# Patient Record
Sex: FEMALE
[De-identification: names, ages, dates, MRNs, and addresses within clinical notes are randomized per-mention and may not be internally consistent; named-entity substitution may affect disease eponyms.]

---

## 2018-11-30 ENCOUNTER — HOSPITAL ENCOUNTER (EMERGENCY)
Dept: HOSPITAL 90 - EDH | Age: 63
Discharge: HOME | End: 2018-11-30
Payer: MEDICAID

## 2018-11-30 DIAGNOSIS — E11.22: ICD-10-CM
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
## 2018-11-30 DIAGNOSIS — F32.9: ICD-10-CM

## 2018-11-30 DIAGNOSIS — Z98.890: ICD-10-CM

## 2018-11-30 DIAGNOSIS — S00.93XA: ICD-10-CM

## 2018-11-30 DIAGNOSIS — S02.5XXA: Primary | ICD-10-CM
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
## 2018-11-30 DIAGNOSIS — N18.9: ICD-10-CM

## 2018-11-30 DIAGNOSIS — I12.9: ICD-10-CM

## 2018-11-30 DIAGNOSIS — S13.4XXA: ICD-10-CM

## 2018-11-30 DIAGNOSIS — Z91.041: ICD-10-CM
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
## 2018-11-30 PROCEDURE — 70486 CT MAXILLOFACIAL W/O DYE: CPT

## 2018-11-30 PROCEDURE — 72125 CT NECK SPINE W/O DYE: CPT

## 2024-12-12 ENCOUNTER — HOSPITAL ENCOUNTER (EMERGENCY)
Dept: HOSPITAL 90 - EDH | Age: 69
Discharge: HOME | End: 2024-12-12
Payer: MEDICARE

## 2024-12-12 VITALS
OXYGEN SATURATION: 97 % | RESPIRATION RATE: 18 BRPM | SYSTOLIC BLOOD PRESSURE: 163 MMHG | DIASTOLIC BLOOD PRESSURE: 62 MMHG | HEART RATE: 84 BPM | TEMPERATURE: 98.7 F

## 2024-12-12 VITALS — BODY MASS INDEX: 27.48 KG/M2 | WEIGHT: 139.99 LBS | HEIGHT: 60 IN

## 2024-12-12 DIAGNOSIS — R22.0: Primary | ICD-10-CM

## 2024-12-12 DIAGNOSIS — E11.9: ICD-10-CM

## 2024-12-12 DIAGNOSIS — Z79.899: ICD-10-CM

## 2024-12-12 DIAGNOSIS — E66.9: ICD-10-CM

## 2024-12-12 DIAGNOSIS — I10: ICD-10-CM

## 2024-12-12 DIAGNOSIS — Z88.8: ICD-10-CM

## 2024-12-12 DIAGNOSIS — Z79.82: ICD-10-CM

## 2024-12-12 DIAGNOSIS — Z79.02: ICD-10-CM

## 2024-12-12 DIAGNOSIS — J45.909: ICD-10-CM

## 2024-12-12 DIAGNOSIS — Z90.49: ICD-10-CM

## 2024-12-12 DIAGNOSIS — Z79.84: ICD-10-CM

## 2024-12-12 DIAGNOSIS — Z91.041: ICD-10-CM

## 2024-12-12 DIAGNOSIS — E78.00: ICD-10-CM

## 2024-12-12 LAB
BASOPHILS # BLD AUTO: 0.08 K/UL (ref 0–0.2)
BASOPHILS NFR BLD AUTO: 0.7 % (ref 0–5)
BNP SERPL-MCNC: 538 PG/ML (ref 0–100)
BUN SERPL-MCNC: 48 MG/DL (ref 7–18)
CHLORIDE SERPL-SCNC: 113 MMOL/L (ref 101–111)
CO2 SERPL-SCNC: 24 MMOL/L (ref 21–32)
CREAT SERPL-MCNC: 3 MG/DL (ref 0.5–1)
EOSINOPHIL # BLD AUTO: 0.41 K/UL (ref 0–0.7)
EOSINOPHIL NFR BLD AUTO: 3.7 % (ref 0–8)
ERYTHROCYTE [DISTWIDTH] IN BLOOD BY AUTOMATED COUNT: 14.5 % (ref 11–15.5)
GFR SERPL CREATININE-BSD FRML MDRD: 17 ML/MIN (ref 90–?)
GLUCOSE SERPL-MCNC: 129 MG/DL (ref 70–105)
HCT VFR BLD AUTO: 33.4 % (ref 36–48)
IMM GRANULOCYTES # BLD: 0.05 K/UL (ref 0–1)
LYMPHOCYTES # SPEC AUTO: 1.6 K/UL (ref 1–4.8)
LYMPHOCYTES NFR SPEC AUTO: 14.4 % (ref 21–51)
MCH RBC QN AUTO: 31 PG (ref 27–33)
MCHC RBC AUTO-ENTMCNC: 31.7 G/DL (ref 32–36)
MCV RBC AUTO: 97.7 FL (ref 79–99)
MONOCYTES # BLD AUTO: 0.8 K/UL (ref 0.1–1)
MONOCYTES NFR BLD AUTO: 6.9 % (ref 3–13)
NEUTROPHILS # BLD AUTO: 8.1 K/UL (ref 1.8–7.7)
NEUTROPHILS NFR BLD AUTO: 73.8 % (ref 40–77)
NRBC BLD MANUAL-RTO: 0 % (ref 0–0.19)
PLATELET # BLD AUTO: 290 K/UL (ref 130–400)
POTASSIUM SERPL-SCNC: 3.6 MMOL/L (ref 3.5–5.1)
RBC # BLD AUTO: 3.42 MIL/UL (ref 4–5.5)
SODIUM SERPL-SCNC: 146 MMOL/L (ref 136–145)
WBC # BLD AUTO: 11 K/UL (ref 4.8–10.8)

## 2024-12-12 PROCEDURE — 83880 ASSAY OF NATRIURETIC PEPTIDE: CPT

## 2024-12-12 PROCEDURE — 36415 COLL VENOUS BLD VENIPUNCTURE: CPT

## 2024-12-12 PROCEDURE — 85025 COMPLETE CBC W/AUTO DIFF WBC: CPT

## 2024-12-12 PROCEDURE — 71045 X-RAY EXAM CHEST 1 VIEW: CPT

## 2024-12-12 PROCEDURE — 96372 THER/PROPH/DIAG INJ SC/IM: CPT

## 2024-12-12 PROCEDURE — 80048 BASIC METABOLIC PNL TOTAL CA: CPT

## 2024-12-12 PROCEDURE — 99284 EMERGENCY DEPT VISIT MOD MDM: CPT

## 2024-12-12 NOTE — HMCIMG
CHEST 1VW



REASON: swelling



COMPARISON: 12/3/2024



FINDINGS:  

Single view of the chest was obtained. Lungs are clear. Heart size is

normal. There is no pulmonary vascular congestion. Mediastinum and bony

thorax appear unremarkable. Posterior column stimulator leads appear

unchanged.



IMPRESSION:



1. No acute finding.

## 2024-12-12 NOTE — ERN
General


Chief Complaint:  Face Pain/Problem


Stated Complaint:  FACIAL SWELLING ONSET 0800


Time Seen by MD:  11:52





History of Present Illness


Initial Comments


69-year-old female who presents for facial swelling.  Patient was just admitted 

to this hospital for about a week for fluid overload.  She was discharged with a

diagnosis of cellulitis and fluid overload pulmonary edema.  She was switched 

from IV diuretics to torsemide, her 1st dose was last night.  She also started 

taking oral doxycycline last night.  She woke up this morning with bilateral eye

puffiness and itching.  She denies any dyspnea cough congestion sore throat 

swelling or rash.  Is unclear if it was this is a reaction to the torsemide or 

to the doxycycline.


Allergies:  


Coded Allergies:  


     iodine (Unverified  Allergy, Hives, itching, 7/22/13)


Home Meds


Active Scripts


Doxycycline Hyclate (Doxycycline Hyclate) 100 Mg Capsule, 100 MG PO BID, #8 CAP


   Prov:ORLANDO WOOD FNP         12/11/24


Nifedipine (Nifedipine ER) 30 Mg Tablet.er, 30 MG PO TIDMEALS, #90 TAB 0 Refills


   Prov:KATI YUAN AGPCNP         12/7/24


Atorvastatin Calcium (LIPITOR) 40 Mg Tablet, 40 MG PO HS, #30 TAB 0 Refills


   [facility]         12/7/24


Reported Medications


Budesonide/Formoterol Fumarate (Budesonide-Formoterol 160-4.5) 160 Mcg-4.5 

Mcg/Actuation Hfa.aer.ad, 1 PUFF IH DAILY for 30 Days, #10.2 GM 0 Refills


   12/5/24


Carvedilol (Carvedilol) 6.25 Mg Tablet, 1 TAB PO BID


   12/2/24


Fluticasone Propionate (Fluticasone Propionate) 50 Mcg/Actuation [place].susp, 2 

SPRAY NS DAILY for 30 Days, #16 GM 0 Refills


   12/1/24


Azelastine HCl (Azelastine HCl) 137 Mcg (0.1 %) [place].pump, 2 SPRAY NS BID for 

30 Days, #30 ML 0 Refills


   12/1/24


Cholecalciferol (Vitamin D3) (Vitamin D3) 25 Mcg (1000 Unit) Capsule, 1 CAP PO 

DAILY for 30 Days, #30 CAP 0 Refills


   12/1/24


Ondansetron HCl (Ondansetron HCl) 4 Mg Tablet, 1 TAB PO Q4HPRN PRN for 

nausea/vomiting for 3 Days, #18 TAB 0 Refills


   12/1/24


Aspirin (Aspirin) 81 Mg Tab.chew, 1 TAB PO DAILY for 30 Days, #30 TAB 0 Refills


   12/1/24


Montelukast Sodium (Singulair) 10 Mg Tablet, 10 MG PO HS, TAB


   12/1/24


Dapagliflozin Propanediol (Farxiga) 5 Mg Tablet, 5 MG PO DAILY, TAB


   12/1/24


Hydralazine HCl (Hydralazine HCl) 50 Mg Tablet, 1 TAB PO BID for 30 Days, #60 

TAB 0 Refills


   12/1/24


Torsemide (Torsemide) 20 Mg Tablet, 1 TAB PO DAILY for 30 Days, #30 TAB 0 

Refills


   12/1/24


Losartan Potassium (Losartan Potassium) 100 Mg Tablet, 1 TAB PO DAILY for 30 

Days, #30 TAB 0 Refills


   12/1/24


Omeprazole (Omeprazole) 40 Mg Capsule.dr, 1 CAP PO DAILY for 30 Days, #30 CAP 0 

Refills


   12/1/24


Clopidogrel Bisulfate (Plavix) 75 Mg Tablet, 1 TAB PO DAILY for 30 Days, #30 TAB

0 Refills


   12/1/24


Vit B Cmplx 3/FA/Vit C/Biotin (Ruby-John Rx Tablet) 1 Mg-60 Mg-300 Mcg Tablet, 1

TAB PO DAILY for 30 Days, #30 TAB 0 Refills


   12/1/24


Gabapentin (Gabapentin) 100 Mg Capsule, 3 CAP PO BID for 30 Days, #90 CAP 0 

Refills


   12/1/24


Discontinued Reported Medications


Rosuvastatin Calcium (Rosuvastatin Calcium) 20 Mg Tablet, 20 MG PO DAILY, TAB


   12/1/24





Past Medical History


Past Medical History:  Asthma, Diabetes-Type II, High Cholesterol, Hypertension,

Kidney Infection, Sinusitis, Vascular Disease


Medical History Other:  CHRONIC BACK PAIN


Past Surgical History:  Appendectomy


Surgical History Other:  BACK SX





ROS Dictation


CONSTITUTIONAL:  No chills, no fever, no weakness, no diaphoresis, no malaise.


HEAD/FACE:  No signs of trauma. 


EENT:  No eye pain, no blurred vision, no tearing, no double vision, no ear 

pain, no ear discharge, no nose pain, no nasal congestion, no throat pain, no 

throat swelling, no mouth pain. 


RESPIRATORY:  No cough, no orthopnea, no SOB, no stridor, no wheezing. 


CARDIOVASCULAR:  No chest pain, no edema, no palpitations, no syncope. 


GASTROINTESTINAL/ABDOMINAL:   No abdominal pain, no constipation, no diarrhea, 

no nausea, no vomiting. 


GENITOURINARY:  No abnormal discharge, no dysuria, no frequent urination, no 

hematuria. No complaints of pain in the genitals. 


MUSCULOSKELETAL:  No back pain, no gout, no joint pain, no joint swelling, no 

muscle pain, no muscle stiffness, no neck pain. 


INTEGUMENTARY:  Face swelling


NEUROLOGICAL/PSYCH:  No anxiety, not depressed, no emotional problem, no 

headache, no numbness, no pre-existing deficit, no history of seizures,  no 

tremors, no weakness. 


HEMATOLOGIC/LYMPHATIC:  Not anemic, no history of blood clots, no apparent 

bleeding, no bruising, glands not swollen.


All Systems Negative, Except as Noted.





Physical Exam


Physical Exam Dictation


VITAL SIGNS:  Reviewed. 


GENERAL APPEARANCE:  Alert, oriented x3, no acute distress, obese.


HEAD AND FACE: Non-traumatic. 


EYES:   PERRL, pink conjunctivas, eyelid no trauma, anterior chamber clear. 


EARS:  Pinnas intact and no signs of trauma or erythema.  Ear canals clear and 

no discharge. TMs no erythema. 


NOSE:  No discharge, no bleeding. 


OROPHARYNX:  Mouth normal, teeth no caries, tongue pink.  Pharynx clear, no 

erythema.  Tonsils no exudates, no abscesses noted. Mucous membrane moist.


NECK:  Supple, non-tender, no thyromegaly, no masses, no JVD, no bruits. 


BREAST:  Deferred.


CHEST:   No tenderness, no crepitus, no paradoxical movement, no retractions.


LUNGS:  Clear, well-ventilated, symmetric, no rales, no wheezing, no rhonchi, no

stridor, good breath sounds bilaterally. 


HEART:  Regular rate, regular rhythm, no murmur, no gallops. 


VASCULAR: No peripheral edema.


ABDOMEN: Soft, positive bowel sounds, nondistended, no guarding, nontender, no 

rebound, no masses no hepatomegaly, no splenomegaly, no Carmona's sign, no 

hernias. 


RECTAL: Deferred. 


GENITAL:  Deferred. 


NEUROLOGICAL:  Normal speech, gross motor function intact, gross sensory 

function intact.


MUSCULOSKELETAL:  Neck nontender, full range of motion, back nontender, full 

range of motion.


EXTREMITIES: Nontender, full range of motion. 


SKIN:  Color pink, dry, no turgor, no rash, no lacerations, no abrasions, no 

contusions.


LYMPHATICS:  Deferred.





Results


Laboratory and Microbiology


Lab and Micro Result





Laboratory Tests








Test


 12/12/24


12:44


 


White Blood Count


 11.0 K/uL


(4.8-10.8)  H


 


Red Blood Count


 3.42 MIL/uL


(4.00-5.50)  L


 


Hemoglobin


 10.6 g/dL


(12.0-16.0)  L


 


Hematocrit


 33.4 % (36-48)


L


 


Mean Corpuscular Volume


 97.7 fL


(79-99)


 


Mean Corpuscular Hemoglobin


 31.0 pg


(27.0-33.0)


 


Mean Corpuscular Hemoglobin


Concent 31.7 g/dL


(32.0-36.0)  L


 


Red Cell Distribution Width


 14.5 %


(11.0-15.5)


 


Platelet Count


 290 K/uL


(130-400)


 


Mean Platelet Volume


 11.2 fL


(7.5-10.5)  H


 


Immature Granulocyte % (Auto) 0.5 % (0-1)  


 


Neutrophils (%) (Auto)


 73.8 %


(40.0-77.0)


 


Lymphocytes (%) (Auto)


 14.4 %


(21.0-51.0)  L


 


Monocytes (%) (Auto)


 6.9 %


(3.0-13.0)


 


Eosinophils (%) (Auto)


 3.7 %


(0.0-8.0)


 


Basophils (%) (Auto)


 0.7 %


(0.0-5.0)


 


Neutrophils # (Auto)


 8.1 K/uL


(1.8-7.7)  H


 


Lymphocytes # (Auto)


 1.6 K/uL


(1.0-4.8)


 


Monocytes # (Auto)


 0.8 K/uL


(0.1-1.0)


 


Eosinophils # (Auto)


 0.41 K/uL


(0.00-0.70)


 


Basophils # (Auto)


 0.08 K/uL


(0.00-0.20)


 


Absolute Immature Granulocyte


(auto 0.05 K/uL


(0-1)


 


Nucleated Red Blood Cells


 0.0 %


(0.0-0.19)


 


Sodium Level


 146 mmol/L


(136-145)  H


 


Potassium Level


 3.6 mmol/L


(3.5-5.1)


 


Chloride Level


 113 mmol/L


(101-111)  H


 


Carbon Dioxide Level


 24 mmol/L


(21-32)


 


Blood Urea Nitrogen


 48 mg/dL


(7-18)  H


 


Creatinine


 3.0 mg/dL


(0.5-1.0)  H


 


Glomerular Filtration Rate


Calc 17 mL/min


(>90)


 


Random Glucose


 129 mg/dL


()  H


 


Total Calcium


 8.2 mg/dL


(8.5-10.1)  L


 


B-Type Natriuretic Peptide


 538 pg/mL


(0-100)  H











MDM


CC:  Face swelling


Historian:  Patient 


Comorbidities: Advanced age, diabetes, hypertension, CHF, CKD, obesity


Limitations by social determinants of health:  None


Differential diagnosis: Medication reaction, allergic reaction.  


Vital signs are stable, remained stable in the ER 


The lab work is at baseline.  I reviewed the patient's chart including previous 

labs.  Everything appears to be at baseline.  


There is no imaging indicated 


Difficult to tell if this is a reaction to the torsemide of the doxycycline.  It

is consistent with an allergic reaction.  She did recently start doxycycline.  

We will recommend that she stops taking the doxycycline and switch to Keflex.  

We will recommend that she continues with the torsemide.  She will follow up 

with her symptoms return to the emergency department 48 hours if she has a 

concerns.  I have very low suspicion for anaphylaxis, flank fluid overload, or 

other life-threatening pathology at this time.


ED Course





Orders








Procedure Category Date Status





  Time 


 


Cbc With Differential LAB 12/12/24 Complete





  12:29 


 


B-Type Natriuretic LAB 12/12/24 Complete





Peptide  12:29 


 


Chest 1vw RAD 12/12/24 Logged





  12:29 


 


Basic Metabolic Panel LAB 12/12/24 Complete





  12:29 


 


12 Lead Ekg Tracing- EKG 12/12/24 Logged





Technical  12:29 








Vital Signs








  Date Time  Temp Pulse Resp B/P (MAP) Pulse Ox O2 Delivery O2 Flow Rate FiO2


 


12/12/24 11:48 97.9 77 16 135/70 97 Room Air 0 











DX & DISP


Disposition:  Discharge


Departure


Impression:  


   Primary Impression:  Facial swelling


Condition:  Stable


Assign Patient to:


As we discussed, it was difficult to tell if you are having a reaction to the 

torsemide or the doxycycline.  





As we discussed, stop taking the doxycycline.  I have prescribed cephalexin to 

take instead.  Take this 3 times per day as prescribed.  





Continue with the torsemide.  





Your lab work is unremarkable.  Your at your baseline.  





If you continue with swelling or any other concerning symptoms in 48 hours, I 

recommend that you return to the emergency department for re-evaluation or 

follow up with the primary doctor.


Scripts


Cephalexin (Cephalexin) 500 Mg Capsule


1 CAP PO TID for 7 Days, #21 CAP 0 Refills


   Prov: KAELA SANDS DO         12/12/24


Referrals:  


RAVINDRA ARTHUR (PCP)











KAELA SANDS DO                Dec 12, 2024 14:02

## 2024-12-13 ENCOUNTER — HOSPITAL ENCOUNTER (EMERGENCY)
Dept: HOSPITAL 90 - EDH | Age: 69
Discharge: HOME | End: 2024-12-13
Payer: MEDICARE

## 2024-12-13 VITALS
SYSTOLIC BLOOD PRESSURE: 103 MMHG | OXYGEN SATURATION: 98 % | DIASTOLIC BLOOD PRESSURE: 55 MMHG | TEMPERATURE: 97.1 F | RESPIRATION RATE: 16 BRPM | HEART RATE: 53 BPM

## 2024-12-13 VITALS — BODY MASS INDEX: 33.6 KG/M2 | WEIGHT: 160.06 LBS | HEIGHT: 58 IN

## 2024-12-13 DIAGNOSIS — J45.909: ICD-10-CM

## 2024-12-13 DIAGNOSIS — I10: ICD-10-CM

## 2024-12-13 DIAGNOSIS — E78.00: ICD-10-CM

## 2024-12-13 DIAGNOSIS — Z79.84: ICD-10-CM

## 2024-12-13 DIAGNOSIS — Z79.82: ICD-10-CM

## 2024-12-13 DIAGNOSIS — M79.605: ICD-10-CM

## 2024-12-13 DIAGNOSIS — Z90.49: ICD-10-CM

## 2024-12-13 DIAGNOSIS — E11.59: ICD-10-CM

## 2024-12-13 DIAGNOSIS — Z79.02: ICD-10-CM

## 2024-12-13 DIAGNOSIS — Z79.899: ICD-10-CM

## 2024-12-13 DIAGNOSIS — Z91.041: ICD-10-CM

## 2024-12-13 DIAGNOSIS — G89.29: Primary | ICD-10-CM

## 2024-12-13 DIAGNOSIS — Z88.8: ICD-10-CM

## 2024-12-13 PROCEDURE — 99284 EMERGENCY DEPT VISIT MOD MDM: CPT

## 2024-12-13 PROCEDURE — 96372 THER/PROPH/DIAG INJ SC/IM: CPT

## 2024-12-13 NOTE — ERN
General


Chief Complaint:  Hip Pain/Injury


Stated Complaint:  LEFT HIP TO LEG PAIN


Time Seen by MD:  17:53


Time Seen by Midlevel:  17:53


Source:  patient





History of Present Illness


Initial Comments


Patient is a 69-year-old female with a past medical history of chronic back pain

and chronic pain presenting to the emergency department for evaluation of 

atraumatic left leg pain.  Patients specifically denies any injury.  Denies any 

fall.  Patient was just seeking pain control.  Denies any other symptoms at this

time.


Allergies:  


Coded Allergies:  


     iodine (Unverified  Allergy, Hives, itching, 7/22/13)


Home Meds


Active Scripts


Cephalexin (Cephalexin) 500 Mg Capsule, 1 CAP PO TID for 7 Days, #21 CAP 0 

Refills


   Prov:KAELA SANDS DO         12/12/24


Doxycycline Hyclate (Doxycycline Hyclate) 100 Mg Capsule, 100 MG PO BID, #8 CAP


   Prov:ORLANDO WOOD FNP         12/11/24


Nifedipine (Nifedipine ER) 30 Mg Tablet.er, 30 MG PO TIDMEALS, #90 TAB 0 Refills


   Prov:KAIT YUAN AGPCNP         12/7/24


Atorvastatin Calcium (LIPITOR) 40 Mg Tablet, 40 MG PO HS, #30 TAB 0 Refills


   [facility]         12/7/24


Reported Medications


Budesonide/Formoterol Fumarate (Budesonide-Formoterol 160-4.5) 160 Mcg-4.5 

Mcg/Actuation Hfa.aer.ad, 1 PUFF IH DAILY for 30 Days, #10.2 GM 0 Refills


   12/5/24


Carvedilol (Carvedilol) 6.25 Mg Tablet, 1 TAB PO BID


   12/2/24


Fluticasone Propionate (Fluticasone Propionate) 50 Mcg/Actuation [place].susp, 2 

SPRAY NS DAILY for 30 Days, #16 GM 0 Refills


   12/1/24


Azelastine HCl (Azelastine HCl) 137 Mcg (0.1 %) [place].pump, 2 SPRAY NS BID for 

30 Days, #30 ML 0 Refills


   12/1/24


Cholecalciferol (Vitamin D3) (Vitamin D3) 25 Mcg (1000 Unit) Capsule, 1 CAP PO 

DAILY for 30 Days, #30 CAP 0 Refills


   12/1/24


Ondansetron HCl (Ondansetron HCl) 4 Mg Tablet, 1 TAB PO Q4HPRN PRN for nausea/v

omiting for 3 Days, #18 TAB 0 Refills


   12/1/24


Aspirin (Aspirin) 81 Mg Tab.chew, 1 TAB PO DAILY for 30 Days, #30 TAB 0 Refills


   12/1/24


Montelukast Sodium (Singulair) 10 Mg Tablet, 10 MG PO HS, TAB


   12/1/24


Dapagliflozin Propanediol (Farxiga) 5 Mg Tablet, 5 MG PO DAILY, TAB


   12/1/24


Hydralazine HCl (Hydralazine HCl) 50 Mg Tablet, 1 TAB PO BID for 30 Days, #60 

TAB 0 Refills


   12/1/24


Torsemide (Torsemide) 20 Mg Tablet, 1 TAB PO DAILY for 30 Days, #30 TAB 0 

Refills


   12/1/24


Losartan Potassium (Losartan Potassium) 100 Mg Tablet, 1 TAB PO DAILY for 30 

Days, #30 TAB 0 Refills


   12/1/24


Omeprazole (Omeprazole) 40 Mg Capsule.dr, 1 CAP PO DAILY for 30 Days, #30 CAP 0 

Refills


   12/1/24


Clopidogrel Bisulfate (Plavix) 75 Mg Tablet, 1 TAB PO DAILY for 30 Days, #30 TAB

0 Refills


   12/1/24


Vit B Cmplx 3/FA/Vit C/Biotin (Ruby-John Rx Tablet) 1 Mg-60 Mg-300 Mcg Tablet, 1

TAB PO DAILY for 30 Days, #30 TAB 0 Refills


   12/1/24


Gabapentin (Gabapentin) 100 Mg Capsule, 3 CAP PO BID for 30 Days, #90 CAP 0 

Refills


   12/1/24


Discontinued Reported Medications


Rosuvastatin Calcium (Rosuvastatin Calcium) 20 Mg Tablet, 20 MG PO DAILY, TAB


   12/1/24





Past Medical History


Past Medical History:  Asthma, Diabetes-Type II, High Cholesterol, Hypertension,

Kidney Infection, Sinusitis, Vascular Disease


Medical History Other:  CHRONIC BACK PAIN


Past Surgical History:  Appendectomy


Surgical History Other:  BACK SX





ROS Dictation


CONSTITUTIONAL:  Negative except for HPI


HEAD/FACE:  Negative except for HPI


EENT:  Negative except for HPI


RESPIRATORY: Negative except for HPI


GASTROINTESTINAL/ABDOMINAL:   Negative except for HPI


GENITOURINARY: Negative except for HPI


MUSCULOSKELETAL: Negative except for HPI


INTEGUMENTARY:  Negative except for HPI


NEUROLOGICAL/PSYCH: Negative except for HPI


HEMATOLOGIC/LYMPHATIC:  Negative except for HPI





All Systems Negative, Except as noted above.





13 point review of systems assessed and all negative except for above.





Physical Exam


Physical Exam Dictation


PHYSICAL EXAM: 


GENERAL: alert,, awake oriented x 3


HEENT: EOMI, Sclera non icteric, moist mucosa


NECK: Supple, no JVD, trachea midline 


LUNGS: Clear breath sounds bilaterally. No wheezes


HEART: Regular rate and rhythm. Normal S1 and S2, without murmurs 


ABD: Abdomen soft, nontender. Bowel sounds present


EXT: No clubbing or cyanosis,


NEURO: Alert and oriented to person, follows commands





MDM


MDM: Patient is a 69-year-old female with a past medical history of chronic back

pain and chronic pain presenting to the emergency department for evaluation of 

atraumatic left leg pain.  Patients specifically denies any injury.  Denies any 

fall.  Patient was just seeking pain control.  Denies any other symptoms at this

time.  On physical examination patient is in no acute distress.  Vital signs are

stable.  There was no palpable tenderness.  Patient has full range motion of 

bilateral upper and lower extremities.  Patient believes this pain is from her 

chronic low back pain.  Patient was given 4 mg of morphine in the emergency 

department and will be discharged home with supportive management.  Return 

precautions discussed


Differential diagnosis:  Chronic pain, malingering, drug-seeking





There are no social concerns with this patient.





Prescription drug management


Prescriptions will include:  None





Medical management and examination interpretation discussions were had by me 

with other qualified healthcare professionals as indicated for the patient's 

care.


ED Course





Orders








Procedure Category Date Status





  Time 


 


Morphine 4mg Syg PHA 12/13/24 Complete





 (Morphine 4mg Syg)  21:00 








Current Medications








 Medications


  (Trade)  Dose


 Ordered  Sig/Chaim


 Route


 PRN Reason  Start Time


 Stop Time Status Last Admin


Dose Admin


 


 Morphine Sulfate


  (morPHINE 4MG


 SYG)  4 mg  ONCE  ONCE


 IM


   12/13/24 21:00


 12/13/24 21:01 DC 12/13/24 22:20











Vital Signs








  Date Time  Temp Pulse Resp B/P (MAP) Pulse Ox O2 Delivery O2 Flow Rate FiO2


 


12/13/24 22:38 97.2 53 16 103/55 98 Room Air* 0 21


 


12/13/24 19:09 97.9 62 16 102/61 99 Room Air  











DX & DISP


Disposition:  Discharge


Departure


Impression:  


   Primary Impression:  Chronic pain of left lower extremity


Condition:  Stable





Additional Instructions:  


You were given pain medication in the emergency department.  


You will need to follow up with your primary care doctor in 2-3 days for repeat 

evaluation.  


Return to the ER for any new or worsening symptoms


Referrals:  


RAVINDRA ARTHUR (PCP)


Time of Disposition:  21:01


I have reviewed the case, and I agree with, Diagnosis and Plan


I performed the substantive portion of the visit.  I have reviewed and 

personally made and approve the management plan that is documented in the note 

by myself or the AUGUSTIN. I acknowledge for responsibility for the patient's 

management plan.











DINO YE                Dec 13, 2024 21:01

## 2024-12-14 ENCOUNTER — HOSPITAL ENCOUNTER (INPATIENT)
Dept: HOSPITAL 90 - EDH | Age: 69
LOS: 6 days | Discharge: HOME | DRG: 871 | End: 2024-12-20
Attending: INTERNAL MEDICINE | Admitting: INTERNAL MEDICINE
Payer: MEDICARE

## 2024-12-14 VITALS
SYSTOLIC BLOOD PRESSURE: 153 MMHG | DIASTOLIC BLOOD PRESSURE: 71 MMHG | TEMPERATURE: 97.9 F | RESPIRATION RATE: 15 BRPM | HEART RATE: 82 BPM

## 2024-12-14 VITALS — BODY MASS INDEX: 27.23 KG/M2 | HEIGHT: 66 IN | WEIGHT: 169.4 LBS

## 2024-12-14 VITALS — SYSTOLIC BLOOD PRESSURE: 145 MMHG | HEART RATE: 103 BPM | DIASTOLIC BLOOD PRESSURE: 83 MMHG | RESPIRATION RATE: 12 BRPM

## 2024-12-14 VITALS — SYSTOLIC BLOOD PRESSURE: 145 MMHG | RESPIRATION RATE: 13 BRPM | DIASTOLIC BLOOD PRESSURE: 91 MMHG | HEART RATE: 109 BPM

## 2024-12-14 VITALS — RESPIRATION RATE: 16 BRPM | HEART RATE: 83 BPM

## 2024-12-14 VITALS — RESPIRATION RATE: 12 BRPM | SYSTOLIC BLOOD PRESSURE: 143 MMHG | HEART RATE: 83 BPM | DIASTOLIC BLOOD PRESSURE: 93 MMHG

## 2024-12-14 VITALS — HEART RATE: 83 BPM | OXYGEN SATURATION: 98 % | RESPIRATION RATE: 14 BRPM

## 2024-12-14 DIAGNOSIS — D84.9: ICD-10-CM

## 2024-12-14 DIAGNOSIS — I25.10: ICD-10-CM

## 2024-12-14 DIAGNOSIS — I50.33: ICD-10-CM

## 2024-12-14 DIAGNOSIS — E11.51: ICD-10-CM

## 2024-12-14 DIAGNOSIS — N18.9: ICD-10-CM

## 2024-12-14 DIAGNOSIS — J96.01: ICD-10-CM

## 2024-12-14 DIAGNOSIS — E87.6: ICD-10-CM

## 2024-12-14 DIAGNOSIS — I13.0: ICD-10-CM

## 2024-12-14 DIAGNOSIS — M24.28: ICD-10-CM

## 2024-12-14 DIAGNOSIS — E11.65: ICD-10-CM

## 2024-12-14 DIAGNOSIS — J98.11: ICD-10-CM

## 2024-12-14 DIAGNOSIS — M47.816: ICD-10-CM

## 2024-12-14 DIAGNOSIS — Z86.73: ICD-10-CM

## 2024-12-14 DIAGNOSIS — D64.9: ICD-10-CM

## 2024-12-14 DIAGNOSIS — Z83.3: ICD-10-CM

## 2024-12-14 DIAGNOSIS — D50.9: ICD-10-CM

## 2024-12-14 DIAGNOSIS — E66.9: ICD-10-CM

## 2024-12-14 DIAGNOSIS — Z79.82: ICD-10-CM

## 2024-12-14 DIAGNOSIS — N17.0: ICD-10-CM

## 2024-12-14 DIAGNOSIS — J44.0: ICD-10-CM

## 2024-12-14 DIAGNOSIS — A41.9: Primary | ICD-10-CM

## 2024-12-14 DIAGNOSIS — G93.41: ICD-10-CM

## 2024-12-14 DIAGNOSIS — J18.9: ICD-10-CM

## 2024-12-14 DIAGNOSIS — Z79.899: ICD-10-CM

## 2024-12-14 DIAGNOSIS — G89.29: ICD-10-CM

## 2024-12-14 DIAGNOSIS — E11.22: ICD-10-CM

## 2024-12-14 DIAGNOSIS — R68.89: ICD-10-CM

## 2024-12-14 DIAGNOSIS — M54.9: ICD-10-CM

## 2024-12-14 DIAGNOSIS — R65.20: ICD-10-CM

## 2024-12-14 LAB
AMMONIA PLAS-SCNC: 15 UMOL/L (ref 11–32)
AMPHETAMINES UR QL SCN: NEGATIVE
APAP SERPL-MCNC: 6 MCG/ML (ref 10–30)
APPEARANCE UR: (no result)
BACTERIA URNS QL MICRO: (no result) /HPF
BARBITURATES UR QL SCN: NEGATIVE
BASE EXCESS BLDA CALC-SCNC: -5.6 MMOL/L (ref -2–3)
BASE EXCESS BLDA CALC-SCNC: -7.6 MMOL/L (ref -2–3)
BASOPHILS # BLD AUTO: 0.04 K/UL (ref 0–0.2)
BASOPHILS NFR BLD AUTO: 0.3 % (ref 0–5)
BENZODIAZ UR QL SCN: NEGATIVE
BILIRUB UR QL STRIP: NEGATIVE MG/DL
BNP SERPL-MCNC: 611 PG/ML (ref 0–100)
BUN SERPL-MCNC: 64 MG/DL (ref 7–18)
BZE UR QL SCN: NEGATIVE
CANNABINOIDS UR QL SCN: NEGATIVE
CHLORIDE SERPL-SCNC: 110 MMOL/L (ref 101–111)
CO2 SERPL-SCNC: 23 MMOL/L (ref 21–32)
COLOR UR: (no result)
CREAT SERPL-MCNC: 3.5 MG/DL (ref 0.5–1)
DEPRECATED SQUAMOUS URNS QL MICRO: (no result) /HPF (ref 0–2)
EOSINOPHIL # BLD AUTO: 0.22 K/UL (ref 0–0.7)
EOSINOPHIL NFR BLD AUTO: 1.8 % (ref 0–8)
ERYTHROCYTE [DISTWIDTH] IN BLOOD BY AUTOMATED COUNT: 14.7 % (ref 11–15.5)
GAS PNL BLDA: (no result)
GAS PNL BLDA: (no result)
GAS PNL BLDA: 37 CELSIUS (ref 35.5–37)
GAS PNL BLDA: 37 CELSIUS (ref 35.5–37)
GFR SERPL CREATININE-BSD FRML MDRD: 14 ML/MIN (ref 90–?)
GLUCOSE SERPL-MCNC: 130 MG/DL (ref 70–105)
GLUCOSE UR STRIP-MCNC: 500 MG/DL
HCO3 BLDA-SCNC: 16.9 MMOL/L (ref 21–28)
HCO3 BLDA-SCNC: 19.3 MMOL/L (ref 21–28)
HCT VFR BLD AUTO: 32.5 % (ref 36–48)
HGB UR QL STRIP: NEGATIVE
IMM GRANULOCYTES # BLD: 0.05 K/UL (ref 0–1)
KETONES UR STRIP-MCNC: NEGATIVE MG/DL
LEUKOCYTE ESTERASE UR QL STRIP: NEGATIVE LEU/UL
LYMPHOCYTES # SPEC AUTO: 1.2 K/UL (ref 1–4.8)
LYMPHOCYTES NFR SPEC AUTO: 10.2 % (ref 21–51)
MAGNESIUM SERPL-MCNC: 1.9 MG/DL (ref 1.8–2.4)
MCH RBC QN AUTO: 31 PG (ref 27–33)
MCHC RBC AUTO-ENTMCNC: 32.3 G/DL (ref 32–36)
MCV RBC AUTO: 95.9 FL (ref 79–99)
MICRO URNS: YES
MONOCYTES # BLD AUTO: 0.8 K/UL (ref 0.1–1)
MONOCYTES NFR BLD AUTO: 6.3 % (ref 3–13)
MUCOUS THREADS URNS QL MICRO: (no result) LPF
NEUTROPHILS # BLD AUTO: 9.8 K/UL (ref 1.8–7.7)
NEUTROPHILS NFR BLD AUTO: 81 % (ref 40–77)
NITRITE UR QL STRIP: NEGATIVE
NRBC BLD MANUAL-RTO: 0 % (ref 0–0.19)
OPIATES UR QL SCN: NEGATIVE
PCO2 BLDA: 32 MMHG (ref 32–45)
PCO2 BLDA: 36 MMHG (ref 32–45)
PCP UR QL SCN: NEGATIVE
PH BLDA: 7.34 [PH] (ref 7.35–7.45)
PH BLDA: 7.34 [PH] (ref 7.35–7.45)
PH UR STRIP: 6 [PH] (ref 5–8)
PLATELET # BLD AUTO: 293 K/UL (ref 130–400)
PO2 BLDA: 91.8 MMHG (ref 83–108)
PO2 BLDA: 92.5 MMHG (ref 83–108)
POTASSIUM SERPL-SCNC: 3.6 MMOL/L (ref 3.5–5.1)
PROT UR QL STRIP: 600 MG/DL
RBC # BLD AUTO: 3.39 MIL/UL (ref 4–5.5)
RBC #/AREA URNS HPF: (no result) /HPF (ref 0–1)
SAO2 % BLDA: 96.7 % (ref 94–98)
SAO2 % BLDA: 96.8 % (ref 94–98)
SODIUM SERPL-SCNC: 144 MMOL/L (ref 136–145)
SP GR UR STRIP: 1.01 (ref 1–1.03)
TSH SERPL DL<=0.05 MIU/L-ACNC: 3.15 UIU/ML (ref 0.36–3.74)
UROBILINOGEN UR STRIP-MCNC: 0.2 MG/DL (ref 0.2–1)
VENTILATION MODE VENT: (no result)
WBC # BLD AUTO: 12.1 K/UL (ref 4.8–10.8)
WBC #/AREA URNS HPF: (no result) /HPF (ref 0–1)

## 2024-12-14 PROCEDURE — 84145 PROCALCITONIN (PCT): CPT

## 2024-12-14 PROCEDURE — 82570 ASSAY OF URINE CREATININE: CPT

## 2024-12-14 PROCEDURE — 93005 ELECTROCARDIOGRAM TRACING: CPT

## 2024-12-14 PROCEDURE — 96375 TX/PRO/DX INJ NEW DRUG ADDON: CPT

## 2024-12-14 PROCEDURE — 72131 CT LUMBAR SPINE W/O DYE: CPT

## 2024-12-14 PROCEDURE — 83880 ASSAY OF NATRIURETIC PEPTIDE: CPT

## 2024-12-14 PROCEDURE — 82948 REAGENT STRIP/BLOOD GLUCOSE: CPT

## 2024-12-14 PROCEDURE — 82310 ASSAY OF CALCIUM: CPT

## 2024-12-14 PROCEDURE — 84300 ASSAY OF URINE SODIUM: CPT

## 2024-12-14 PROCEDURE — 84100 ASSAY OF PHOSPHORUS: CPT

## 2024-12-14 PROCEDURE — 99284 EMERGENCY DEPT VISIT MOD MDM: CPT

## 2024-12-14 PROCEDURE — 99285 EMERGENCY DEPT VISIT HI MDM: CPT

## 2024-12-14 PROCEDURE — 83935 ASSAY OF URINE OSMOLALITY: CPT

## 2024-12-14 PROCEDURE — 82308 ASSAY OF CALCITONIN: CPT

## 2024-12-14 PROCEDURE — 70450 CT HEAD/BRAIN W/O DYE: CPT

## 2024-12-14 PROCEDURE — 87641 MR-STAPH DNA AMP PROBE: CPT

## 2024-12-14 PROCEDURE — 70551 MRI BRAIN STEM W/O DYE: CPT

## 2024-12-14 PROCEDURE — 80048 BASIC METABOLIC PNL TOTAL CA: CPT

## 2024-12-14 PROCEDURE — 94664 DEMO&/EVAL PT USE INHALER: CPT

## 2024-12-14 PROCEDURE — 94640 AIRWAY INHALATION TREATMENT: CPT

## 2024-12-14 PROCEDURE — 82803 BLOOD GASES ANY COMBINATION: CPT

## 2024-12-14 PROCEDURE — 80305 DRUG TEST PRSMV DIR OPT OBS: CPT

## 2024-12-14 PROCEDURE — 84443 ASSAY THYROID STIM HORMONE: CPT

## 2024-12-14 PROCEDURE — 84484 ASSAY OF TROPONIN QUANT: CPT

## 2024-12-14 PROCEDURE — 83540 ASSAY OF IRON: CPT

## 2024-12-14 PROCEDURE — 4A00X4Z MEASUREMENT OF CENTRAL NERVOUS ELECTRICAL ACTIVITY, EXTERNAL APPROACH: ICD-10-PCS

## 2024-12-14 PROCEDURE — 85025 COMPLETE CBC W/AUTO DIFF WBC: CPT

## 2024-12-14 PROCEDURE — 83735 ASSAY OF MAGNESIUM: CPT

## 2024-12-14 PROCEDURE — 81001 URINALYSIS AUTO W/SCOPE: CPT

## 2024-12-14 PROCEDURE — 87040 BLOOD CULTURE FOR BACTERIA: CPT

## 2024-12-14 PROCEDURE — 85027 COMPLETE CBC AUTOMATED: CPT

## 2024-12-14 PROCEDURE — 83605 ASSAY OF LACTIC ACID: CPT

## 2024-12-14 PROCEDURE — 82140 ASSAY OF AMMONIA: CPT

## 2024-12-14 PROCEDURE — 36600 WITHDRAWAL OF ARTERIAL BLOOD: CPT

## 2024-12-14 PROCEDURE — 83970 ASSAY OF PARATHORMONE: CPT

## 2024-12-14 PROCEDURE — 82728 ASSAY OF FERRITIN: CPT

## 2024-12-14 PROCEDURE — 71045 X-RAY EXAM CHEST 1 VIEW: CPT

## 2024-12-14 PROCEDURE — 71250 CT THORAX DX C-: CPT

## 2024-12-14 PROCEDURE — 80053 COMPREHEN METABOLIC PANEL: CPT

## 2024-12-14 PROCEDURE — 83550 IRON BINDING TEST: CPT

## 2024-12-14 PROCEDURE — 83036 HEMOGLOBIN GLYCOSYLATED A1C: CPT

## 2024-12-14 PROCEDURE — 72128 CT CHEST SPINE W/O DYE: CPT

## 2024-12-14 PROCEDURE — 96372 THER/PROPH/DIAG INJ SC/IM: CPT

## 2024-12-14 PROCEDURE — 95819 EEG AWAKE AND ASLEEP: CPT

## 2024-12-14 PROCEDURE — 72148 MRI LUMBAR SPINE W/O DYE: CPT

## 2024-12-14 PROCEDURE — 36415 COLL VENOUS BLD VENIPUNCTURE: CPT

## 2024-12-14 RX ADMIN — AZITHROMYCIN MONOHYDRATE STA MLS/HR: 500 INJECTION, POWDER, LYOPHILIZED, FOR SOLUTION INTRAVENOUS at 20:42

## 2024-12-14 RX ADMIN — SODIUM CHLORIDE STA MLS/HR: 0.9 INJECTION, SOLUTION INTRAVENOUS at 20:40

## 2024-12-14 RX ADMIN — PIPERACILLIN SODIUM AND TAZOBACTAM SODIUM SCH GM: .375; 3 INJECTION, POWDER, LYOPHILIZED, FOR SOLUTION INTRAVENOUS at 22:11

## 2024-12-14 NOTE — NUR
SPOKE WITH MARCOS ORTIZ TO NOTIFY OF BG RESULTS PER NP [place], NO FURTHER ORDERS GIVEN 
AT THIS TIME./ROSANNA

## 2024-12-14 NOTE — NUR
SPOKE WITH [facility] NYA CONDE NP ON PENDING CONSULT FOR PATIENT. NO FURTHER 
ORDERS GIVEN AT THIS TIME./ROSANNA

## 2024-12-14 NOTE — CONS
[facility] CONSULTATION NOTE 





Date Patient Seen: Dec 14, 2024 


Time of Visit: 21:18


Supervising Physician: [ DR. DANE MOORE]


Reason for Consultation: [ CRITICAL CARE MANAGEMENT]





Primary Care Physician: [ ]


Outpatient Specialists: [ ]


Inpatient Consults: [ ]





PROBLEM LIST:


1. ACUTE METABOLIC ENCEPHALOPATHY


2. ACUTE ON CHRONIC KIDNEY INJURY


3. CHF ACUTE ON CHRONIC


4. BILATERAL LOWER LOBES PNEUMONIA


5. DIABETES TYPE 2





CHIEF COMPLAINT:  Alteration of mental status


HPI:


Patient is a 69-year-old female with past medical history significant for 

chronic kidney disease, CHF, hypertension, hyperlipidemia, diabetes type 2, 

chronic back pain, and no surgical history of appendectomy, back surgery with 

back stimulator placement, brought to the emergency department for evaluation of

new onset of alteration of mental status that  was noted today around 03:20 PM. 

Daughter at bedside, patient suddenly became unresponsive and started shaking.  

EMS was called at the scene and patient was taken to the emergency department 

for further evaluation and treatment.  The workup in the emergency department 

shows on his chest x-ray worsening CHF versus bilateral lower lobes pneumonia.  

The lab work in the emergency department reveals WBC of 12.1, creatinine 64, BUN

3.4, pro BNP of 611.  UDS and ammonia level were normal.  In the emergency 

department, patient received azithromycin and ceftriaxone.  The critical care 

team has been consulted for critical care management.








PAST MEDICAL HX:


see above





PAST SURGICAL HX:


noncontributory





SOCIAL HISTORY:


No tobacco, ETOH, or illicit drug use


Coded Allergies:  


     iodine (Unverified  Allergy, Hives, itching, 7/22/13)





REVIEW OF SYSTEMS: 


12 point ROS reviewed with patient. Pertinent positives mentioned above. 

Otherwise negative.





PHYSICAL EXAM: 


GENERAL: alert time one


HEENT: EOMI, Sclera non icteric, moist mucosa 


NECK: Supple, no JVD, trachea midline 


LUNGS: Clear breath sounds bilaterally. No wheezes


HEART: Regular rate and rhythm. Normal S1 and S2, without murmurs 


ABD: Abdomen soft, nontender. Bowel sounds present


EXT: No clubbing cyanosis or edema


NEURO: Alert , unable to follow commands





                             Vital Signs (last 8hr)








  Date Time  Temp Pulse Resp B/P (MAP) Pulse Ox O2 Delivery O2 Flow Rate FiO2


 


12/14/24 19:35 97.0 80 16 114/66 98 Nasal Cannula* 2 28


 


12/14/24 18:49 97.0 79 17 116/70 98 Nasal Cannula* 2 28


 


12/14/24 17:40 97.0 79 14 108/65 99 Nasal Cannula* 2 28


 


12/14/24 17:24 97.3 86 20 104/69 94 Room Air* 0 21


 


12/14/24 17:13 98.4 86 18 121/85 98  0 











LABS:





                                Hematology Labs:








Test


 12/14/24


17:36 Range/Units


 


 


White Blood Count 12.1 H 4.8-10.8  K/uL


 


Red Blood Count


 3.39 L


 4.00-5.50


MIL/uL


 


Hemoglobin 10.5 L 12.0-16.0  g/dL


 


Hematocrit 32.5 L 36-48  %


 


Mean Corpuscular Volume 95.9  79-99  fL


 


Mean Corpuscular Hemoglobin 31.0  27.0-33.0  pg


 


Mean Corpuscular Hemoglobin


Concent 32.3 


 32.0-36.0  g/dL





 


Red Cell Distribution Width 14.7  11.0-15.5  %


 


Platelet Count 293  130-400  K/uL


 


Mean Platelet Volume 11.7 H 7.5-10.5  fL


 


Immature Granulocyte % (Auto) 0.4  0-1  %


 


Neutrophils (%) (Auto) 81.0 H 40.0-77.0  %


 


Lymphocytes (%) (Auto) 10.2 L 21.0-51.0  %


 


Monocytes (%) (Auto) 6.3  3.0-13.0  %


 


Eosinophils (%) (Auto) 1.8  0.0-8.0  %


 


Basophils (%) (Auto) 0.3  0.0-5.0  %


 


Neutrophils # (Auto) 9.8 H 1.8-7.7  K/uL


 


Lymphocytes # (Auto) 1.2  1.0-4.8  K/uL


 


Monocytes # (Auto) 0.8  0.1-1.0  K/uL


 


Eosinophils # (Auto) 0.22  0.00-0.70  K/uL


 


Basophils # (Auto) 0.04  0.00-0.20  K/uL


 


Absolute Immature Granulocyte


(auto 0.05 


 0-1  K/uL





 


Nucleated Red Blood Cells 0.0  0.0-0.19  %








                                 Chemistry Labs:








Test


 12/14/24


17:36 12/14/24


17:31 Range/Units


 


 


Sodium Level 144   136-145  mmol/L


 


Potassium Level 3.6   3.5-5.1  mmol/L


 


Chloride Level 110   101-111  mmol/L


 


Carbon Dioxide Level 23   21-32  mmol/L


 


Blood Urea Nitrogen 64 H  7-18  mg/dL


 


Creatinine 3.5 H  0.5-1.0  mg/dL


 


Glomerular Filtration Rate


Calc 14 


 


 >90  mL/min





 


Random Glucose 130 H    mg/dL


 


Lactic Acid Level 2.0   0.8-2.5  mmol/L


 


Total Calcium 8.2 L  8.5-10.1  mg/dL


 


Ammonia 15   11-32  umol/L


 


Troponin I High Sensitivity 9   4-50  ng/L


 


B-Type Natriuretic Peptide 611 H  0-100  pg/mL


 


Whole Blood Glucose  142 H   MG/DL











DIAGNOSTICS / RADIOLOGY RESULTS:


[ ]





PLAN





NEURO: 


Minimize central acting medications as possible. 


Fall Precautions.


Well lighted room through the day and minimize interruptions through the night 

to prevent acute delirium. 





PULMONARY: 


Supplemental 02 as needed


Titrate Fio2 to keep Spo2 > or = 90% 


DuoNebs and CPT as needed


DuoNebs every 8 hours


Repeat ABG stat


IS hourly while awake for pulmonary hygiene


Out of bed to chair as tolerated 


VAP Bundle 


Vent/BIPAP Settings: [ ]


Driving pressure: [ ]


P Plat: [ ]


Static C: [ ]


Static R: [ ]


P/F Ratio: [ ]





CARDIOVASCULAR: 


Follow hemodynamics. 


Titrate vasopressor to keep MAP >65 or systolic blood pressure >95mmHg 


Obtain 2D echo cardiology to read


Start Coreg 6.25 mg p.o. b.i.d. and Lasix 20 mg IV every 8 hours


Consult patient cardiologist





DIPS: 


[ ]





LINES: 


[ ]





GI & NUTRITION:


Continue nutritional support


Keep patient NPO


Aspirations precautions


Prokinetic agents and laxatives as needed 





KIDNEYS & ELECTROLYTES: 


Strict monitoring of intake and output 


Consult patient nephrologist


Daily weights 


Avoid nephrotoxic agents


Monitor electrolytes and replace as needed


Goal urine output of 30mL/hr or 0.5mL/kg/hr 


   Urine output: [ ]


   Fluid Balance: [ ]





ENDOCRINE:


Maintain blood glucose between 100-180 at all times.


Insulin sliding scale for blood glucose management 


Obtain hemoglobin A1c





INFECTIOUS DISEASE: 


Trend temperature. 


Pan-culture if febrile.


Status Zosyn 3.375 g IV every 8 hours and doxycycline 100 mg IV every 12 hours


Blood culture x2





Micro: [ ]





Antibiotics: [ ]





HEMATOLOGY & COAGULATION: 


Monitor H&H.  Keep Hgb > 7


Transfuse 1 unit of PRBC for Hgb < 7


Transfuse 1 pack of platelets of platelets < 20, 000


Watch for any signs and symptoms of bleeding





SKIN:


Pressure ulcer prevention per facility protocol


Turn patient every 2 hours





Rehab: PT/OT 


Prophylaxis:


GI: [Famotidine ]


DVT:  [SCDs ]





Code Status: Full Resuscitation


Disposition: [ Keep in critical care unit]





Other:


Total patient care time exceeds 60 minutes excluding all procedures.








Case was discussed and seen with my supervising physician Dr DANE Moore.  The 

above plan was formulated and agreed upon. 














JOHN PAUL CONDE [facility]               Dec 14, 2024 21:33

## 2024-12-14 NOTE — HP
History of Present Illness


Reason for Visit:  [facility]


History of Present Illness


Ms. Saeed Is a 69-year-old female that was seen and examined today on 

2024.  Patient is a poor historian and personal health.  Patient's 

daughter Ginger Saeed is at bedside and able to provide the following 

information.  





Patient was brought to the emergency department by ambulance with a chief 

complaint of altered mental status.  Onset was 1500.  Location is to entire 

body.  Duration is constant.  Character is described as" she just not talking or

able to get up.  "There was no alleviating factors.  There was no aggravating 

factors.  Patient has been taking gabapentin for her back pain.  Patient has 

also been having some muscle spasms.  Muscle spasms were treated with 1 mg of 

Ativan IV in the emergency department.  Emergency room physician recommended 

that patient be admitted with a diagnosis of metabolic encephalopathy.  Today in

the emergency department WBCs 12.1, left shift neutrophils 81.0% creatinine 3.5,

BUN 64 urinalysis unremarkable, urine drug screen unremarkable.  ABG 

unremarkable.  Chest x-ray shows bilateral airspace consolidation she was just 

seen vascular congestion/edema versus pneumonia.  Small right effusion is seen. 

CT of head shows mild atrophy.  No acute intracranial bleed is seen.  Scattered 

nonspecific white matter changes.


Past Medical History


Patient History:  


Carcinomas


  FATHER, , Age: 85 (lungs )


Cardiovascular disease


  MOTHER, , Age: 92


Diabetes mellitus


  MOTHER, , Age: 92


  FATHER, , Age: 85


  BROTHER


  SISTER


Hypertension


  MOTHER, , Age: 92


  SISTER





No Family History of:


  Alzheimer's disease


  Asthma


  Chronic obstructive pulmonary disease


  Completed stroke


  Immunocompromised state


  Sudden death


  Unknown


ADDITIONAL PAST MEDICAL HISTORY:  [Diabetes mellitius type2, hyperlipidemia, 

hypertension, CKD, asthma, PVD]





SOCIAL HISTORY:  [Negative for smoking, alcohol use, drug use.  Patient lives 

with the daughter Ginger.  Patient requires assistance with her ADLs.  Patient

 has good access to health care through her insurance.  Patient denies 

difficulty paying her bills.]





SURGICAL HISTORY:  [Femoral popliteal bypass x2, back surgery, nerve stimulator 

implantation by Dr. Lin]





Review of Systems


General:  No Fever, No Chills, No Night Sweats, No Fatigue, No Malaise, No 

Appetite, No Other


HEENT:  No Head Aches, No Visual Changes, No Eye Pain, No Ear Pain, No 

Dysphasia, No Sinus Congestion, No Post Nasal Drip, No Sore Throat, No Other


Pulmonary:  No Dyspnea, No Cough, No Pleuritic Chest Pain, No Other


Cardiovascular:  No: Chest Pain, Palpitations, Orthopnea, Paroxysmal Noc. 

Dyspnea, Edema, Lt Headedness, Other


Gastrointestinal:  No: Nausea, Vomiting, Abdominal Pain, Diarrhea, Constipation,

Melena, Hematochezia, Other


Genitourinary:  No Dysuria, No Frequency, No Incontinence, No Hematuria, No 

Retention, No Other


Musculoskeletal:  No: other, neck pain, shoulder pain, arm pain, back pain, hand

pain, leg pain, foot pain


Skin:  No Urticaria, No Rash, No Other


Neurological:  Confusion; No: Weakness, Numbness, Incoordination, Change in 

speech, Seizures, Other


Allergies:  


Coded Allergies:  


     iodine (Unverified  Allergy, Hives, itching, 13)


Scheduled


Aspirin (Aspirin), 1 TAB PO DAILY, (Reported)


Atorvastatin Calcium (Lipitor), 40 MG PO HS


Azelastine HCl (Azelastine HCl), 2 SPRAY NS BID, (Reported)


Budesonide/Formoterol Fumarate (Budesonide-Formoterol 160-4.5), 1 PUFF IH DAILY,

(Reported)


Carvedilol (Carvedilol), 1 TAB PO BID, (Reported)


Cephalexin (Cephalexin), 1 CAP PO TID


Cholecalciferol (Vitamin D3) (Vitamin D3), 1 CAP PO DAILY, (Reported)


Clopidogrel Bisulfate (Plavix), 1 TAB PO DAILY, (Reported)


Dapagliflozin Propanediol (Farxiga), 5 MG PO DAILY, (Reported)


Doxycycline Hyclate (Doxycycline Hyclate), 100 MG PO BID


Fluticasone Propionate (Fluticasone Propionate), 2 SPRAY NS DAILY, (Reported)


Gabapentin (Gabapentin), 3 CAP PO BID, (Reported)


Hydralazine HCl (Hydralazine HCl), 1 TAB PO BID, (Reported)


Losartan Potassium (Losartan Potassium), 1 TAB PO DAILY, (Reported)


Montelukast Sodium (Singulair), 10 MG PO HS, (Reported)


Nifedipine (Nifedipine ER), 30 MG PO TIDMEALS


Omeprazole (Omeprazole), 1 CAP PO DAILY, (Reported)


Torsemide (Torsemide), 1 TAB PO DAILY, (Reported)


Vit B Cmplx 3/FA/Vit C/Biotin (Ruby-John Rx Tablet), 1 TAB PO DAILY, (Reported)





Scheduled PRN


Ondansetron HCl (Ondansetron HCl), 1 TAB PO Q4HPRN PRN for nausea/vomiting, 

(Reported)





Exam


Vital Signs





Vital Signs








  Date Time  Temp Pulse Resp B/P (MAP) Pulse Ox O2 Delivery O2 Flow Rate FiO2


 


24 19:35 97.0 80 16 114/66 98 Nasal Cannula* 2 28








General Appearance:  Alert (x1), moderate distress


HEENT:  Atraumatic


Respiratory:  Other (Diminished air entry to bilateral lower lobes)


Cardiovascular:  Regular rate, Regular rhythm, Normal S1, Normal S2


Abdominal:  Normal bowel sounds, Soft, No tenderness


Extremities:  No edema


Skin:  Other (Positive bruising to right forearm)


Neuro:  Normal speech, Strength at 5/5 X4 ext, Sensation intact, Cranial nerves 

3-12 NL


Psych/Mental Status:  Mental status NL, Mood NL, Thoughts/Content NL


Additional PE


Genitourinary:  Positive presence of Matos catheter





Assessment/Plan


ASSESSMENT:  [


Metabolic encephalopathy, POA


Leukocytosis, POA


ALICE on CKD, POA 


Right pleural effusion, POA 


Pulmonary congestion versus pneumonia, by chest x-ray 


Diabetes mellitius type2


Hypertension 


Asthma 


PVD


Hyperlipidemia]





PLAN:  [





Admit patient to ICU as inpatient status.  


Place patient on telemetry monitoring.  


Patient will be followed by critical care team.





Acute metabolic encephalopathy:


Neuro checks every 4 hours with the vital signs (check GCS, level of consc

iousness, extremity movement, pupil reaction, hand grasp strength, speech 

clarity)


MRI in a.m..  


Reviewed patient's CT of head unremarkable





Leukocytosis:


Check blood culture, follow up with the results 


Empiric antibiotic therapy with Zosyn


Reviewed patient's urinalysis, unremarkable 


Chest x-ray suggestive of possible pneumonia





alice on ckd:


Patient will be followed by Nephrology Service.


Calculate FENA 


Check urine sodium, creatinine, osmolality


Avoid nephrotoxic agents when possible 


Renally dose all medications when possible


Monitor patient's labs.  


Weight patient daily.  


Monitor intake and output.





Small right pleural effusion:


Patient will be followed by pulmonology service/critical Care team.  Appreciate 

recommendations.  








Diabetes mellitus type 2: 


Check hemoglobin A1c in a.m.


Glucometer checks a.c. and HS 


1800 ADA diet once patient is more awake


Humulin R sliding scale 1/2 dose





Hypertension, asthma, PVD, hyperlipidemia:


Consider resuming home medications once they have been reconciled inpatient is 

no longer NPO.  


For now, Hydralazine 10 mg IV every 4 hours for systolic blood pressure greater 

than 160 mmHg


Albuterol 2.5 mg nebulized every 4 hours as needed for shortness of breaths





DVT prophylaxis, heparin 5000 units subcutaneously twice daily.





Critical Care Time:





I spent    ___ 51 ___ minutes of critical care time with the patient.  I reviewe

d lab work, change the patient's medication, and coordinated protocol in the 

event of tachycardia or desaturation.  The patient status remains unchanged





ADVANCED CARE PLANNING





1. Which of the following were discussed?


   Hospice Care -          Yes 


   Therapeutic options - Yes 


   Advance Directives -  Yes-patient does not have any advance directives in 

place at this time however her daughter Ginger can make decisions for her if 

she becomes unable.


   Other discussions - patient wishes to remain a full code at this time





2. Discussed with who?  Patient








3. Voluntary nature of this service was explained to the patient?


   Yes 





4. Amount of time spent - ___ 16 minutes ____





5. Reviewed by Physician? (if this service was performed by NPP)


   Yes 





This document was generated in part using voice recognition software, occasional

 wrong word or sound alike substitutions may have occurred due to the inherent 

limitations of voice recognition software.  Read the chart carefully and 

recognize using context, where the substitutions have occurred.  Although every 

effort was made to edit the content, transcription and typing errors may occur


------------------------------


ADDENDUM:





ATTENDING PHYSICIAN ATTESTATION:


I have seen and discussed this patient with the midlevel and I agree with their 

plan.  See my addendum for updates to the medical plan





MD STACI Galvez JOE D [facility]               Dec 14, 2024 20:23


YAW WEINBERG MD            Dec 15, 2024 11:07

## 2024-12-14 NOTE — ERN
ED Note


History of Present Illness


Stated Complaint:  AMS


Chief Complaint:  Altered Mental Status


Time Seen by MD:  17:16


Time Seen by Midlevel:  17:20


Dictation:


69-year-old female with a history of CKD, CHF, hypertension coming in via EMS 

for altered mental status onset today.  EMS gave him Narcan on board.  Spouse 

states patient take Tylenol three for her chronic back pain.  Denies any recent 

falls, trauma, fever, nausea, vomiting.  On initial evaluation patients that has

anterior stable, she is awake but is not following commands.


Allergies:  


Coded Allergies:  


     iodine (Unverified  Allergy, Hives, itching, 13)


Home Meds


Active Scripts


Cephalexin (Cephalexin) 500 Mg Capsule, 1 CAP PO TID for 7 Days, #21 CAP 0 

Refills


   Prov:KAELA SANDS DO         24


Doxycycline Hyclate (Doxycycline Hyclate) 100 Mg Capsule, 100 MG PO BID, #8 CAP


   Prov:ORLANDO WOOD FNP         24


Nifedipine (Nifedipine ER) 30 Mg Tablet.er, 30 MG PO TIDMEALS, #90 TAB 0 Refills


   Prov:KAIT YUAN AGPCNP         24


Atorvastatin Calcium (LIPITOR) 40 Mg Tablet, 40 MG PO HS, #30 TAB 0 Refills


   Prov[facility]         24


Reported Medications


Budesonide/Formoterol Fumarate (Budesonide-Formoterol 160-4.5) 160 Mcg-4.5 

Mcg/Actuation Hfa.aer.ad, 1 PUFF IH DAILY for 30 Days, #10.2 GM 0 Refills


   24


Carvedilol (Carvedilol) 6.25 Mg Tablet, 1 TAB PO BID


   24


Fluticasone Propionate (Fluticasone Propionate) 50 Mcg/Actuation [place].susp, 2 

SPRAY NS DAILY for 30 Days, #16 GM 0 Refills


   24


Azelastine HCl (Azelastine HCl) 137 Mcg (0.1 %) [place].pump, 2 SPRAY NS BID for 

30 Days, #30 ML 0 Refills


   24


Cholecalciferol (Vitamin D3) (Vitamin D3) 25 Mcg (1000 Unit) Capsule, 1 CAP PO 

DAILY for 30 Days, #30 CAP 0 Refills


   24


Ondansetron HCl (Ondansetron HCl) 4 Mg Tablet, 1 TAB PO Q4HPRN PRN for nausea/

vomiting for 3 Days, #18 TAB 0 Refills


   24


Aspirin (Aspirin) 81 Mg Tab.chew, 1 TAB PO DAILY for 30 Days, #30 TAB 0 Refills


   24


Montelukast Sodium (Singulair) 10 Mg Tablet, 10 MG PO HS, TAB


   24


Dapagliflozin Propanediol (Farxiga) 5 Mg Tablet, 5 MG PO DAILY, TAB


   24


Hydralazine HCl (Hydralazine HCl) 50 Mg Tablet, 1 TAB PO BID for 30 Days, #60 

TAB 0 Refills


   24


Torsemide (Torsemide) 20 Mg Tablet, 1 TAB PO DAILY for 30 Days, #30 TAB 0 

Refills


   24


Losartan Potassium (Losartan Potassium) 100 Mg Tablet, 1 TAB PO DAILY for 30 

Days, #30 TAB 0 Refills


   24


Omeprazole (Omeprazole) 40 Mg Capsule.dr, 1 CAP PO DAILY for 30 Days, #30 CAP 0 

Refills


   24


Clopidogrel Bisulfate (Plavix) 75 Mg Tablet, 1 TAB PO DAILY for 30 Days, #30 TAB

0 Refills


   24


Vit B Cmplx 3/FA/Vit C/Biotin (Ruby-John Rx Tablet) 1 Mg-60 Mg-300 Mcg Tablet, 1

TAB PO DAILY for 30 Days, #30 TAB 0 Refills


   24


Gabapentin (Gabapentin) 100 Mg Capsule, 3 CAP PO BID for 30 Days, #90 CAP 0 

Refills


   24


Discontinued Reported Medications


Rosuvastatin Calcium (Rosuvastatin Calcium) 20 Mg Tablet, 20 MG PO DAILY, TAB


   24





Past Medical History


Past Medical History:  Asthma, Diabetes-Type II, High Cholesterol, Hypertension,

Kidney Infection, Sinusitis, Vascular Disease


Additional Past Medical Hx:  CHRONIC BACK PAIN


Surgical History:  Appendectomy


Surgical History Other:  BACK SX





Review of System


Dictation


Constitutional: Negative for fever,chills, and weight loss


Eyes: Negative for injury, pain,redness, and discharge


ENT: Negative for injury,pain or swelling


Cardiovascular: Negative for chest pain, palpitations, and edema


Respiratory: Negative for shortness of breath, cough, and wheezing, 


Abdomen/GI: Negative for abdominal pain, nausea, vomiting, diarrhea, and 

constipation


Back: Negative for injury and pain


: Negative for injury, bleeding and discharge


MS/Extremity: Negative for injury and deformity


Skin: Negative for rash, and discoloration


Neuro: Negative for headache, weakness, numbness, tingling, and seizure 


Psych: Negative for suicide ideation, homicidal ideation, and hallucinations


Review of Systems:   was completed





Initial Vital Sign


VS





                                   Vital Signs








  Date Time  Temp Pulse Resp B/P (MAP) Pulse Ox O2 Delivery O2 Flow Rate FiO2


 


24 17:13 98.4 86 18 121/85 98  0 


 


24 17:24      Room Air*  21











Physical Exam


Dictation


General: awake, alert, however not answering questions and not following 

commands


Head/Face: Normocephalic, atraumatic


Eyes: PERRL, EOMI, vision at baseline


ENT: oral cavity clear, TMs clear, no signs of infection


Neck: Trachea midline, supple, no nuchal rigidity


Cardiovascular: RRR, normal S1/S2, No MRGs, no JVD


Respiratory: CTAB, no respiratory distress, No rales or wheezes


Abdomen: Soft, non-tender, non-distended, normal bowel sounds, no guarding or 

rebound.


Skin: Warm, dry, normal turgor, no rash


MS/Extremity: Pulses equal, no cyanosis, neurovascular intact, FROM, 

tremors/jerking noted


Neuro: , strength 5/5, CN 2-12 intact, 


Psych:  Patient is awake, not answering any questions, not following any 

commands





Results (Laboratory/Radiology)


Laboratory/Radiology





Laboratory Tests








Test


 24


17:31 24


17:36 24


17:55 24


19:00


 


Whole Blood Glucose


 142 MG/DL


()  H 


 


 





 


White Blood Count


 


 12.1 K/uL


(4.8-10.8)  H 


 





 


Red Blood Count


 


 3.39 MIL/uL


(4.00-5.50)  L 


 





 


Hemoglobin


 


 10.5 g/dL


(12.0-16.0)  L 


 





 


Hematocrit


 


 32.5 % (36-48)


L 


 





 


Mean Corpuscular Volume


 


 95.9 fL


(79-99) 


 





 


Mean Corpuscular Hemoglobin


 


 31.0 pg


(27.0-33.0) 


 





 


Mean Corpuscular Hemoglobin


Concent 


 32.3 g/dL


(32.0-36.0) 


 





 


Red Cell Distribution Width


 


 14.7 %


(11.0-15.5) 


 





 


Platelet Count


 


 293 K/uL


(130-400) 


 





 


Mean Platelet Volume


 


 11.7 fL


(7.5-10.5)  H 


 





 


Immature Granulocyte % (Auto)  0.4 % (0-1)    


 


Neutrophils (%) (Auto)


 


 81.0 %


(40.0-77.0)  H 


 





 


Lymphocytes (%) (Auto)


 


 10.2 %


(21.0-51.0)  L 


 





 


Monocytes (%) (Auto)


 


 6.3 %


(3.0-13.0) 


 





 


Eosinophils (%) (Auto)


 


 1.8 %


(0.0-8.0) 


 





 


Basophils (%) (Auto)


 


 0.3 %


(0.0-5.0) 


 





 


Neutrophils # (Auto)


 


 9.8 K/uL


(1.8-7.7)  H 


 





 


Lymphocytes # (Auto)


 


 1.2 K/uL


(1.0-4.8) 


 





 


Monocytes # (Auto)


 


 0.8 K/uL


(0.1-1.0) 


 





 


Eosinophils # (Auto)


 


 0.22 K/uL


(0.00-0.70) 


 





 


Basophils # (Auto)


 


 0.04 K/uL


(0.00-0.20) 


 





 


Absolute Immature Granulocyte


(auto 


 0.05 K/uL


(0-1) 


 





 


Nucleated Red Blood Cells


 


 0.0 %


(0.0-0.19) 


 





 


Sodium Level


 


 144 mmol/L


(136-145) 


 





 


Potassium Level


 


 3.6 mmol/L


(3.5-5.1) 


 





 


Chloride Level


 


 110 mmol/L


(101-111) 


 





 


Carbon Dioxide Level


 


 23 mmol/L


(21-32) 


 





 


Blood Urea Nitrogen


 


 64 mg/dL


(7-18)  H 


 





 


Creatinine


 


 3.5 mg/dL


(0.5-1.0)  H 


 





 


Glomerular Filtration Rate


Calc 


 14 mL/min


(>90) 


 





 


Random Glucose


 


 130 mg/dL


()  H 


 





 


Lactic Acid Level


 


 2.0 mmol/L


(0.8-2.5) 


 





 


Total Calcium


 


 8.2 mg/dL


(8.5-10.1)  L 


 





 


Ammonia


 


 15 umol/L


(11-32) 


 





 


Troponin I High Sensitivity  9 ng/L (4-50)    


 


B-Type Natriuretic Peptide


 


 611 pg/mL


(0-100)  H 


 





 


Serum Alcohol


 


 < 3 mg/dL


(0-10) 


 





 


Urine Color


 


 


 LIGHT-YELLOW


(YELLOW) 





 


Urine Appearance


 


 


 CLOUDY (CLEAR)


H 





 


Urine pH   6.0 (5.0-8.0)   


 


Urine Specific Gravity


 


 


 1.014


(1.001-1.031) 





 


Urine Protein


 


 


 600 mg/dL


(NEGATIVE)  H 





 


Urine Glucose (UA)


 


 


 500 mg/dL


(NEGATIVE)  H 





 


Urine Ketones


 


 


 NEGATIVE mg/dL


(NEGATIVE) 





 


Urine Occult Blood


 


 


 NEGATIVE


(NEGATIVE) 





 


Urine Nitrate


 


 


 NEGATIVE


(NEGATIVE) 





 


Urine Bilirubin


 


 


 NEGATIVE mg/dL


(NEGATIVE) 





 


Urine Urobilinogen


 


 


 0.2 mg/dL


(0.2-1.0) 





 


Urine Leukocyte Esterase


 


 


 NEGATIVE


Sanaz/uL 





 


Urine RBC


 


 


 0-1 /HPF (0-1)


 





 


Urine WBC


 


 


 2-5 /HPF (0-1)


H 





 


Urine Squamous Epithelial


Cells 


 


 RARE /HPF


(0-2) 





 


Urine Bacteria


 


 


 RARE /HPF


(None Seen) 





 


Urine Opiates Screen


 


 


 NEGATIVE


(NEGATIVE) 





 


Urine Barbiturates Screen


 


 


 NEGATIVE


(NEGATIVE) 





 


Urine Phencyclidine Screen


 


 


 NEGATIVE


(NEGATIVE) 





 


Urine Amphetamines Screen


 


 


 NEGATIVE


(NEGATIVE) 





 


Urine Benzodiazepines Screen


 


 


 NEGATIVE


(NEGATIVE) 





 


Urine Cocaine Screen


 


 


 NEGATIVE


(NEGATIVE) 





 


Urine Marijuana (THC) Screen


 


 


 NEGATIVE


(NEGATIVE) 





 


Blood Gas Specimen Type    Arterial  


 


Arterial Blood pH


 


 


 


 7.345


(7.350-7.450)


 


Arterial Blood Partial


Pressure CO2 


 


 


 36 mmHg


(32-45)


 


Arterial Blood Partial


Pressure O2 


 


 


 91.8 mmHg


(83.0-108.0)


 


Arterial Blood HCO3


 


 


 


 19.3 mmol/L


(21.0-28.0)  L


 


Arterial Blood Oxygen


Saturation 


 


 


 96.7 %


(94.0-98.0)


 


Arterial Blood Base Excess


 


 


 


 -5.6 mmol/L


(-2.0-3.0)  L


 


Blood Gas Temperature


 


 


 


 37.0 CELSIUS


(35.5-37.0)


 


Blood Gas Flow-by


 


 


 


 2.00 L/min


(0.00-15.00)


 


Blood Gas Vent Mode    NC (ROOM AIR)  


 


FiO2    28.0 %  


 


Blood Gas Specimen Comment    RR  








Labs Reviewed?:  Yes


EKG Comment:


Date:24


Time:1737


Ventricular rate:80


AR interval:144


QRS duration:-5


QT/QTc:399/460


EKG interpretation:  Sinus rhythm, low voltage, precordial leads, borderline ST 

elevation, lateral leads


Reviewed by ED Attending no STEMI interpreted by ER MD


X-RAY Comment:


                            [place] [address] [address] [address] [phone]


                                        


                                 IMAGING REPORT


                                     Signed





PATIENT: BOSTON ANDRADE                                MR#: [id number]


ACCOUNT#: [id number]                              : 1955


SEX: F AGE: 69                                      LOCATION: ED


ORDER DT: 24                             STATUS: REG ER


[id number]                            REPORT#: [id number]


SERVICE DT: 24





REASON: SOB


ORDERING PHYSICIAN: BRAYAN GENAO NP


PROCEDURE: CXR1VW  -  CHEST 1VW














INDICATION: SOB





TECHNIQUE: CHEST 1VW





COMPARISON: 2024








FINDINGS/IMPRESSION:


Bilateral airspace consolidation suggesting vascular congestion/edema


versus pneumonia. Small right effusion is seen.


Cardiomegaly is seen 


Mild degenerative changes of the spine.


The visualized upper abdomen appears unremarkable.


























DICTATED BY: TUCKER RAYO MD


DATE: 24





ELECTRONICALLY SIGNED BY: TUCKER RAYO MD


DATE: 24


CT Scan Comment:


                            [address] [address] [phone]


                                        


                                 IMAGING REPORT


                                     Signed





PATIENT: BOSTON ANDRADE                                MR#: [id number]


ACCOUNT#: [id number]                              : 1955


SEX: F AGE: 69                                      LOCATION: ED


ORDER DT: 24                             STATUS: REG ER


ACCESSION#: [id number]                            REPORT#: [id number]


SERVICE DT: 24





REASON: AMS


ORDERING PHYSICIAN: BRAYAN GENAO NP


PROCEDURE: HEAD WO  -  CT HEAD/BRAIN W/O CONTRAST





CT HEAD/BRAIN W/O CONTRAST





INDICATION:   AMS





TECHNIQUE: CT HEAD/BRAIN W/O CONTRAST. CT was performed with one or more


of the following dose reduction techniques: Automated exposure control,


adjustment of the mA and/or kV according to the patient's size, or use


of the iterative reconstruction technique.


Comparison: None





FINDINGS:


Cerebral atrophy seen. Nonspecific periventricular and subcortical white


matters changes are noted likely representing small vessel ischemic


changes. No midline shift or herniation. No extra axial collection. No


acute intracranial bleed.


The visualized paranasal sinuses and mastoid air cells are normally


aerated.











IMPRESSION: 


Mild atrophy. No acute intracranial bleed is seen.


Scattered nonspecific white matter changes 























DICTATED BY: TUCKER RAYO MD


DATE: 24 1736





ELECTRONICALLY SIGNED BY: TUCKER RAYO MD


DATE: 24 1740





ED Course


ED Course





Orders








Procedure Category Date Status





  Time 


 


Cbc With Differential LAB 24 Complete





  17:17 


 


Basic Metabolic Panel LAB 24 Complete





  17:17 


 


Lactic Acid LAB 24 Complete





  17:17 


 


Urinalysis Profile LAB 24 Complete





  17:17 


 


B-Type Natriuretic LAB 24 Complete





Peptide  17:17 


 


Chest 1vw RAD 24 Resulted





  17:17 


 


12 Lead Ekg Tracing- EKG 24 Logged





Technical  17:17 


 


Ct Head/Brain W/O CT 24 Resulted





Contrast  17:17 


 


Ammonia LAB 24 Complete





  17:17 


 


Drug Screen Urine LAB 24 Complete





  17:17 


 


Troponin I High LAB 24 Complete





Sensitivity  17:26 


 


Arterial Blood Gas RT 24 Transmitted





  17:31 


 


Alcohol, Blood LAB 24 Complete





  17:32 


 


Lorazepam 2 Mg PHA 24 Complete





 (Ativan)  17:38 


 


Lorazepam 2 Mg PHA 24 Complete





 (Ativan)  17:39 


 


Arterial Blood Gas LAB 24 Complete





  19:00 


 


0.9%Nacl 1000ml (Ns PHA 24 In Process





1000ml)  20:04 


 


Edm Admit Bridge Order ADM 24 Transmitted





  20:04 


 


Blood Cult TIMOTHY 24 Transmitted





  20:16 


 


Ceftriaxone 1g Vial PHA 24 Transmitted





 (Rocephine 1g Inj)  20:16 


 


Azithromycin 500mg+Ns PHA 24 Transmitted





250ml (Azithromyci  20:16 


 


Admit Orders ADM 24 Verified





  20:18 








Current Medications








 Medications


  (Trade)  Dose


 Ordered  Sig/Cahim


 Route


 PRN Reason  Start Time


 Stop Time Status Last Admin


Dose Admin


 


 Lorazepam


  (AtiVAN)  1 mg  ONCE  STAT


 IVP


   24 17:39


 24 17:40 DC 24 17:46





 


 Lorazepam


  (AtiVAN)  2 mg  STK-MED ONCE


 .ROUTE


   24 17:38


 24 17:39 DC  





 


 Sodium Chloride  1,000 ml @ 


 500 mls/hr  Q2H STAT


 IV


   24 20:04


 24 22:03   











Vital Signs








  Date Time  Temp Pulse Resp B/P (MAP) Pulse Ox O2 Delivery O2 Flow Rate FiO2


 


24 19:35 97.0 80 16 114/66 98 Nasal Cannula* 2 28


 


24 18:49 97.0 79 17 116/70 98 Nasal Cannula* 2 28


 


24 17:40 97.0 79 14 108/65 99 Nasal Cannula* 2 28


 


24 17:24 97.3 86 20 104/69 94 Room Air* 0 21


 


24 17:13 98.4 86 18 121/85 98  0 











Medical Decision Making


MDM


MDM: 69-year-old female with a history of CKD, CHF, hypertension coming in via 

EMS for altered mental status onset today.  EMS gave him Narcan on board.  

Spouse states patient take Tylenol three for her chronic back pain.  Denies any 

recent falls, trauma, fever, nausea, vomiting.  On initial evaluation patients 

that has anterior stable, she is awake but is not following commands.  Spoke to 

the daughter on triage, states patient has not been taking the Tylenol three for

few months now, states earlier she woke up normal,  went to bed she noticed the 

jerking while she was sleeping, when she tried to wake her up patient was 

altered.  Nurse states the twitching has been going on since she was admitted 

here in the hospital couple of weeks ago.  Patient's daughter states she does 

take gabapentin on a regular basis for her chronic back pain but no other pain 

medication other than Tylenol.CBC shows leukocytosis of 12, normocytic anemia, 

no thrombocytopenia.  Chemistry shows hyperglycemia, worsening kidney function 

from previous visits.  Lactic level within normal range.  Ammonia within normal 

range.  Troponin negative.  BNP 60 11, worsened from previous visit.  UA shows 

no evidence of urinary tract infection,  negative.  CT of the head 

shows mild atrophy no acute intracranial bleed.  Chest x-ray shows bilateral 

airspace consolidation suggesting vascular congestion/edema versus pneumonia, 

small right pleural effusion, cardiomegaly.  Blood cultures and antibiotics 

initiated in the ER for pneumonia, worsening on chest x-ray from previous visit.

 Spoke to MARCOS Arroyo for hospitalist, patient will be admitted for ALICE on CKD, 

acute metabolic encephalopathy, pneumonia and altered mental status.  ER MD at 

bedside assisting patient was me, patient's vital signs are stable patient is 

maintaining her respirations, no need to further intervene. 


Differential diagnosis:  ICH, CVA, metabolic acidosis, dehydration, MI


Rationale: Tests considered and ordered secondary to shared decision making 

include: labs, ECG and radiology


Previous outside records reviewed: Old ER visits.


Risk of complication and/or morbidity or mortality of patient management: None


Medications-Per medication reconciliation


Need for hospitalization: Patient does meet criteria for hospitalization.


Need for emergency major/minor surgery: No





There are no social concerns with this patient.





Prescription drug management


Prescriptions will include symptomatic care





Patient's prior external medical records from other ER visits were reviewed by 

me as indicated.  Prior testing and results from previous visits were reviewed. 

Prior tests were taken into account with medical decision making and resource 

utilization, independent historian/historians were used to obtain complete 

medical history.





I independently interpreted the test that were performed, results were reviewed 

by me and considered findings on radiology if ordered.





Medical management and examination interpretation discussions were had by me 

with other qualified healthcare professionals as indicated for the patient's 

care.





DX & DISP


Disposition:  Inpatient


Decision to Admit Date:  Dec 14, 2024


Decision to Admit Time:  20:20


Departure


Impression:  


   Primary Impression:  Pneumonia


   Additional Impressions:  Acute kidney injury superimposed on CKD, Acute 

   metabolic encephalopathy, Altered mental status


Condition:  Stable


Referrals:  


RAVINDRA ARTHUR (PCP)


I have reviewed the case, and I agree with, Diagnosis and Plan











BRAYAN GENAO NP             Dec 14, 2024 19:41

## 2024-12-14 NOTE — HMCIMG
CT HEAD/BRAIN W/O CONTRAST



INDICATION:   AMS



TECHNIQUE: CT HEAD/BRAIN W/O CONTRAST. CT was performed with one or more

of the following dose reduction techniques: Automated exposure control,

adjustment of the mA and/or kV according to the patient's size, or use

of the iterative reconstruction technique.

Comparison: None



FINDINGS:

Cerebral atrophy seen. Nonspecific periventricular and subcortical white

matters changes are noted likely representing small vessel ischemic

changes. No midline shift or herniation. No extra axial collection. No

acute intracranial bleed.

The visualized paranasal sinuses and mastoid air cells are normally

aerated.







IMPRESSION: 

Mild atrophy. No acute intracranial bleed is seen.

Scattered nonspecific white matter changes

## 2024-12-14 NOTE — HMCIMG
INDICATION: SOB



TECHNIQUE: CHEST 1VW



COMPARISON: 12/12/2024





FINDINGS/IMPRESSION:

Bilateral airspace consolidation suggesting vascular congestion/edema

versus pneumonia. Small right effusion is seen.

Cardiomegaly is seen 

Mild degenerative changes of the spine.

The visualized upper abdomen appears unremarkable.

## 2024-12-14 NOTE — NUR
NOTIFIED NP NABI ON REPEAT ABG RESULTS, INSTRUCTED TO REPEAT BG AND CALL BACK 
WITH RESULTS./ROSANNA

## 2024-12-15 VITALS — SYSTOLIC BLOOD PRESSURE: 141 MMHG | HEART RATE: 92 BPM | DIASTOLIC BLOOD PRESSURE: 77 MMHG | RESPIRATION RATE: 18 BRPM

## 2024-12-15 VITALS
OXYGEN SATURATION: 98 % | TEMPERATURE: 99.2 F | HEART RATE: 99 BPM | SYSTOLIC BLOOD PRESSURE: 173 MMHG | RESPIRATION RATE: 16 BRPM | DIASTOLIC BLOOD PRESSURE: 95 MMHG

## 2024-12-15 VITALS
SYSTOLIC BLOOD PRESSURE: 144 MMHG | RESPIRATION RATE: 15 BRPM | OXYGEN SATURATION: 98 % | TEMPERATURE: 97.8 F | HEART RATE: 97 BPM | DIASTOLIC BLOOD PRESSURE: 89 MMHG

## 2024-12-15 VITALS
RESPIRATION RATE: 28 BRPM | SYSTOLIC BLOOD PRESSURE: 144 MMHG | TEMPERATURE: 99.8 F | HEART RATE: 107 BPM | DIASTOLIC BLOOD PRESSURE: 89 MMHG | OXYGEN SATURATION: 96 %

## 2024-12-15 VITALS
RESPIRATION RATE: 38 BRPM | SYSTOLIC BLOOD PRESSURE: 147 MMHG | TEMPERATURE: 99 F | DIASTOLIC BLOOD PRESSURE: 81 MMHG | HEART RATE: 98 BPM

## 2024-12-15 VITALS
DIASTOLIC BLOOD PRESSURE: 75 MMHG | TEMPERATURE: 99.1 F | HEART RATE: 115 BPM | RESPIRATION RATE: 17 BRPM | OXYGEN SATURATION: 97 % | SYSTOLIC BLOOD PRESSURE: 149 MMHG

## 2024-12-15 VITALS — HEART RATE: 94 BPM | DIASTOLIC BLOOD PRESSURE: 83 MMHG | RESPIRATION RATE: 13 BRPM | SYSTOLIC BLOOD PRESSURE: 131 MMHG

## 2024-12-15 VITALS — SYSTOLIC BLOOD PRESSURE: 152 MMHG | HEART RATE: 101 BPM | DIASTOLIC BLOOD PRESSURE: 99 MMHG | RESPIRATION RATE: 15 BRPM

## 2024-12-15 VITALS — HEART RATE: 106 BPM | DIASTOLIC BLOOD PRESSURE: 93 MMHG | RESPIRATION RATE: 13 BRPM | SYSTOLIC BLOOD PRESSURE: 164 MMHG

## 2024-12-15 VITALS — SYSTOLIC BLOOD PRESSURE: 157 MMHG | RESPIRATION RATE: 12 BRPM | DIASTOLIC BLOOD PRESSURE: 114 MMHG

## 2024-12-15 VITALS — SYSTOLIC BLOOD PRESSURE: 156 MMHG | DIASTOLIC BLOOD PRESSURE: 83 MMHG | RESPIRATION RATE: 17 BRPM | HEART RATE: 98 BPM

## 2024-12-15 VITALS — DIASTOLIC BLOOD PRESSURE: 104 MMHG | SYSTOLIC BLOOD PRESSURE: 146 MMHG | RESPIRATION RATE: 11 BRPM | HEART RATE: 108 BPM

## 2024-12-15 VITALS
OXYGEN SATURATION: 98 % | HEART RATE: 87 BPM | SYSTOLIC BLOOD PRESSURE: 136 MMHG | RESPIRATION RATE: 14 BRPM | DIASTOLIC BLOOD PRESSURE: 84 MMHG

## 2024-12-15 VITALS — SYSTOLIC BLOOD PRESSURE: 162 MMHG | RESPIRATION RATE: 14 BRPM | HEART RATE: 108 BPM | DIASTOLIC BLOOD PRESSURE: 84 MMHG

## 2024-12-15 VITALS — HEART RATE: 101 BPM | RESPIRATION RATE: 16 BRPM

## 2024-12-15 VITALS — RESPIRATION RATE: 16 BRPM | HEART RATE: 102 BPM | OXYGEN SATURATION: 97 %

## 2024-12-15 VITALS — HEART RATE: 101 BPM | RESPIRATION RATE: 13 BRPM | DIASTOLIC BLOOD PRESSURE: 88 MMHG | SYSTOLIC BLOOD PRESSURE: 152 MMHG

## 2024-12-15 VITALS — DIASTOLIC BLOOD PRESSURE: 91 MMHG | HEART RATE: 110 BPM | RESPIRATION RATE: 11 BRPM | SYSTOLIC BLOOD PRESSURE: 155 MMHG

## 2024-12-15 VITALS — HEART RATE: 99 BPM | SYSTOLIC BLOOD PRESSURE: 155 MMHG | DIASTOLIC BLOOD PRESSURE: 89 MMHG | RESPIRATION RATE: 17 BRPM

## 2024-12-15 VITALS — SYSTOLIC BLOOD PRESSURE: 149 MMHG | RESPIRATION RATE: 13 BRPM | HEART RATE: 90 BPM | DIASTOLIC BLOOD PRESSURE: 82 MMHG

## 2024-12-15 VITALS — HEART RATE: 89 BPM | DIASTOLIC BLOOD PRESSURE: 84 MMHG | SYSTOLIC BLOOD PRESSURE: 154 MMHG | RESPIRATION RATE: 17 BRPM

## 2024-12-15 VITALS — RESPIRATION RATE: 15 BRPM | SYSTOLIC BLOOD PRESSURE: 141 MMHG | HEART RATE: 106 BPM | DIASTOLIC BLOOD PRESSURE: 75 MMHG

## 2024-12-15 VITALS — HEART RATE: 114 BPM | DIASTOLIC BLOOD PRESSURE: 75 MMHG | SYSTOLIC BLOOD PRESSURE: 157 MMHG | RESPIRATION RATE: 21 BRPM

## 2024-12-15 VITALS — RESPIRATION RATE: 16 BRPM | HEART RATE: 92 BPM

## 2024-12-15 VITALS — DIASTOLIC BLOOD PRESSURE: 91 MMHG | RESPIRATION RATE: 16 BRPM | HEART RATE: 86 BPM | SYSTOLIC BLOOD PRESSURE: 143 MMHG

## 2024-12-15 VITALS — SYSTOLIC BLOOD PRESSURE: 154 MMHG | DIASTOLIC BLOOD PRESSURE: 93 MMHG | HEART RATE: 100 BPM | RESPIRATION RATE: 25 BRPM

## 2024-12-15 VITALS — DIASTOLIC BLOOD PRESSURE: 89 MMHG | SYSTOLIC BLOOD PRESSURE: 137 MMHG | HEART RATE: 88 BPM | RESPIRATION RATE: 12 BRPM

## 2024-12-15 VITALS — RESPIRATION RATE: 23 BRPM | DIASTOLIC BLOOD PRESSURE: 80 MMHG | SYSTOLIC BLOOD PRESSURE: 159 MMHG | HEART RATE: 98 BPM

## 2024-12-15 VITALS — OXYGEN SATURATION: 97 %

## 2024-12-15 VITALS — RESPIRATION RATE: 16 BRPM | HEART RATE: 101 BPM | OXYGEN SATURATION: 97 %

## 2024-12-15 VITALS — HEART RATE: 105 BPM | RESPIRATION RATE: 16 BRPM

## 2024-12-15 LAB
BASOPHILS # BLD AUTO: 0.01 K/UL (ref 0–0.2)
BASOPHILS NFR BLD AUTO: 0.1 % (ref 0–5)
BUN SERPL-MCNC: 62 MG/DL (ref 7–18)
CHLORIDE SERPL-SCNC: 111 MMOL/L (ref 101–111)
CO2 SERPL-SCNC: 22 MMOL/L (ref 21–32)
CREAT SERPL-MCNC: 3.4 MG/DL (ref 0.5–1)
CREAT UR-MCNC: 25.77 MG/DL (ref 30–135)
EOSINOPHIL # BLD AUTO: 0 K/UL (ref 0–0.7)
EOSINOPHIL NFR BLD AUTO: 0 % (ref 0–8)
ERYTHROCYTE [DISTWIDTH] IN BLOOD BY AUTOMATED COUNT: 14.8 % (ref 11–15.5)
GFR SERPL CREATININE-BSD FRML MDRD: 14 ML/MIN (ref 90–?)
GLUCOSE SERPL-MCNC: 162 MG/DL (ref 70–105)
HBA1C MFR BLD: 9.7 % (ref 4–6)
HCT VFR BLD AUTO: 33.4 % (ref 36–48)
IMM GRANULOCYTES # BLD: 0.07 K/UL (ref 0–1)
LYMPHOCYTES # SPEC AUTO: 0.7 K/UL (ref 1–4.8)
LYMPHOCYTES NFR SPEC AUTO: 5.1 % (ref 21–51)
MAGNESIUM SERPL-MCNC: 1.9 MG/DL (ref 1.8–2.4)
MCH RBC QN AUTO: 31 PG (ref 27–33)
MCHC RBC AUTO-ENTMCNC: 31.7 G/DL (ref 32–36)
MCV RBC AUTO: 97.7 FL (ref 79–99)
MONOCYTES # BLD AUTO: 0.2 K/UL (ref 0.1–1)
MONOCYTES NFR BLD AUTO: 1.4 % (ref 3–13)
NEUTROPHILS # BLD AUTO: 13.5 K/UL (ref 1.8–7.7)
NEUTROPHILS NFR BLD AUTO: 92.9 % (ref 40–77)
NRBC BLD MANUAL-RTO: 0 % (ref 0–0.19)
PHOSPHATE SERPL-MCNC: 7.3 MG/DL (ref 2.5–4.9)
PLATELET # BLD AUTO: 292 K/UL (ref 130–400)
POTASSIUM SERPL-SCNC: 3.6 MMOL/L (ref 3.5–5.1)
RBC # BLD AUTO: 3.42 MIL/UL (ref 4–5.5)
SODIUM SERPL-SCNC: 144 MMOL/L (ref 136–145)
SODIUM UR-SCNC: 67 MMOL/L (ref 40–220)
WBC # BLD AUTO: 14.5 K/UL (ref 4.8–10.8)
WBC MORPH BLD: (no result)

## 2024-12-15 RX ADMIN — SODIUM CHLORIDE SCH MLS/HR: 9 INJECTION, SOLUTION INTRAVENOUS at 15:28

## 2024-12-15 RX ADMIN — MEROPENEM SCH MG: 500 INJECTION, POWDER, FOR SOLUTION INTRAVENOUS at 12:40

## 2024-12-15 RX ADMIN — FAMOTIDINE SCH MG: 10 INJECTION INTRAVENOUS at 09:36

## 2024-12-15 RX ADMIN — METHYLPREDNISOLONE SODIUM SUCCINATE SCH MG: 40 INJECTION, POWDER, FOR SOLUTION INTRAMUSCULAR; INTRAVENOUS at 09:36

## 2024-12-15 RX ADMIN — VANCOMYCIN ONE MLS/HR: 1.5 INJECTION, SOLUTION INTRAVENOUS at 12:40

## 2024-12-15 NOTE — NUR
CALLED UNIT TO NOTIFY NURSE PATIENT PENDING CT EXAMS.  NURSE WILL CALL CT WHEN READY TO 
BRING PATIENT TO CT DEPT.

## 2024-12-15 NOTE — EKG
[facility]

                                       

Test Date:    2024               Test Time:    17:37:44

Pat Name:     BOSOTN ANDRADE              Department:   [facility]

Patient ID:   [id number]           Room:         216 1

Gender:       F                        Technician:   [id number]

:          1955               Requested By: BRAYAN GENAO

Order Number: [id number]        Reading MD:   Stephan Sotelo

                                 Measurements

Intervals                              Axis          

Rate:         80                       P:            33

SD:           144                      QRS:          -5

QRSD:         93                       T:            30

QT:           399                                    

QTc:          460                                    

                           Interpretive Statements

Sinus rhythm

Low voltage, precordial leads

Borderline ST elevation, lateral leads

Compared to ECG 2024 12:14:50

Low QRS voltage now present

ST (T wave) deviation now present

Myocardial infarct finding no longer present

Electronically Signed On 2024 20:58:20 CST by Stephan Sotelo



Please click the below link to view image of tracing.

## 2024-12-15 NOTE — NUR
DCP Home vs SNF



Pt in bed eyes closed. Daughter Ginger Saeed [phone] at [facility] states pt lives with 
sister Cas Van in a house for about 9yrs. Pt has cane, walker, and wheelchair 
daughter Ginger is whom takes her to MD appointments. PCP is Jonathan Sanchez MD 
anticipates discharge Home vs SNF.

-------------------------------------------------------------------------------

Addendum: 12/15/24 at 1031 by DANETTE KEEN RN CM

-------------------------------------------------------------------------------

Amended: Links added.

## 2024-12-15 NOTE — CONS
REFERRING PHYSICIAN:  Abdirahman Marie MD



REASON FOR CONSULTATION:  Renal failure.



HISTORY OF PRESENT ILLNESS:  The patient is a 69-year-old female who was just 

recently discharged from the hospital.  She presented to the hospital with 

altered mental status.  The patient had consecutive visits to the Emergency Room

with very similar issues.  The patient apparently had been on Neurontin as an 

outpatient.  The patient's laboratory values revealed advanced renal 

dysfunction, and she is being seen in consultation for all the above.  I did 

discuss with the ICU team, also discussed with the patient's family.



PAST MEDICAL HISTORY:  Diabetes mellitus, hypertension, hypercholesterolemia, 

vascular disease, and renal failure.



PAST SURGICAL HISTORY:  Lower extremity bypass surgery and back surgery.



SOCIAL HISTORY:  She lives independently.  There is no tobacco use.



FAMILY HISTORY:  There is no renal disease in the family.



ALLERGIES:  SHE HAS AN ALLERGY TO IODINE.



MEDICATIONS:  All noted.



REVIEW OF SYSTEMS:

GENERAL:  She is feeling weak and tired.

HEENT:  No change in vision.  No change in hearing.

CARDIOVASCULAR:  There is no current chest pain or palpitations.

PULMONARY:  No shortness of breath.

GASTROINTESTINAL:  Appetite is poor.

MUSCULOSKELETAL:  As described above.

NEUROLOGIC:  No history of seizures or focal deficits.

PSYCHIATRIC:  No history of hallucinations or psychosis.

ENDOCRINE:  Diabetes mellitus.  No history of thyroid disease.

HEME:  History of anemia.  No history of malignancy.



PHYSICAL EXAMINATION:

VITAL SIGNS:  Blood pressure is 144/89, pulse is 90s.

GENERAL:  Chronically ill female, lying in bed on medical floor.

HEENT:  Head is atraumatic.  Pupils equal, roving to light.  Oropharynx is 

without exudate.  Nares clear.

NECK:  There is no JVP.  There is no thyromegaly, no mass.

CARDIOVASCULAR:  Regular.  There is no S3, S4, or gallop.

LUNGS:  Coarse with equal thoracic movement.

ABDOMEN:  Soft, nondistended, and nontender.

EXTREMITIES:  No clubbing, no cyanosis.

NEUROLOGICAL:  She is awake.  She is alert.  She is oriented.

SKIN:  Reveals no rash or nodules.

BACK:  There is no CVA tenderness, no back deformities.



LABORATORY DATA:  BUN 62, Creatinine is 3.4.  Sodium is 144.  Hemoglobin 10, 

hematocrit 32, white blood cell count is 14,000.  Phosphorus is 7.3.



IMPRESSION:

1.  Advanced renal dysfunction.

2.  Encephalopathy.

3.  Diabetes mellitus.

4.  Hypertension.

5.  Anemia.



PLAN:  The patient does present with advanced renal dysfunction.  She has a 

history of known chronic renal insufficiency.  There is no acute need for any 

form of renal replacement therapy. At this time, the patient is with significant

electrolyte abnormalities.  We will send off a PTH level for completeness.  Once

the patient is started on the diet, she can be placed on Renvela for her 

hyperphosphatemia.  We will continue to follow the patient closely.  The patient

and the family at the bedside, multiple questions were all answered.







DD: 12/15/2024 10:56 AM

DT: 12/15/2024 12:12 PM

TID: [id number] RECEIPT: [id number]

## 2024-12-15 NOTE — PRN
Procedure Note


[facility] EEG Note





# Demographics


Type of EEG Read:


- Routine EEG


- without video





Patient Location: Inpatient





First Name: BOSTON





Last Name: RAYMOND





YOB: 1955





Age: 69





Gender: Female





Facility: [facility]





Time of Initial Page (Central Time): 12/15/2024, 16:30





Time of Return Call (Central Time): 12/15/2024, 16:32





# EEG Interpretation


Start Time of EEG Read (Central Time): 12/15/2024, 15:35





Stop Time of EEG Read (Central Time): 12/15/2024, 16:00





Duration: 0h 25m





Technical Details:


- This study was recorded using the [employer] EEG software


- The EEG electrodes were placed using the standard International 10-20 system 

of electrode placement. An accessory EKG lead was used during the course of this

study.





Indication:


- altered mental status





# Description


Photic Stimulation: Performed





Hyperventilation: NOT performed





Phases Captured:


- unresponsive





Symmetry: symmetric





Posterior Dominant Rhythm: absent





Predominant Frequencies:


The background consists of mild diffuse slowing.





EKG: NSR





# Abnormalities


Stimulation:


- photic stimulation does NOT cause abnormalities





Epileptiform Abnormalities:


- NOT present





Focal Slowing: no





Seizure:


- NOT present





# Impression


Impression: abnormal


1. Mild Diffuse Slowing





# Clinical Correlation


Clinical Correlation: 


1. Diffuse slowing is non-specific and may be seen in the setting of diffuse 

cerebral dysfunction; such as toxic/metabolic/infectious encephalopathy or 

heavily sedating medication use.





# Logistics


Telemedicine: remote EEG review: EEG reviewed remotely





# Demographics


First Name: BOSTON


Last Name: RAYMOND


Facility: [facility]











JEM OLIVER MD           Dec 15, 2024 16:42

## 2024-12-15 NOTE — HMCIMG
CT THORACIC SPINE W/O CONTRAST, CT LUMBAR SPINE W/O CONTRAST



HISTORY: sepsis, back pain, ruling out device infection



TECHNIQUE: CT THORACIC SPINE W/O CONTRAST, CT LUMBAR SPINE W/O CONTRAST.

Coronal and sagittal reformats were obtained. CT was performed with one

or more of the following dose reduction techniques: Automated exposure

control, adjustment of the mA and/or kV according to the patient's size,

or use of the iterative reconstruction technique.





FINDINGS: 

Evaluation of the cord and discs is limited with CT. 

No evidence of compression fracture or dislocation.  

There is diffuse osteopenia degraded evaluation of the study.

Multilevel degenerative changes are noted. Postop changes of posterior

fusion are seen at L1-L2 on the right with pedicle screws and fixation

bar. Advanced degenerative changes are seen at L2-L3 with disc space

loss, endplate osteophytes and endplate sclerosis/lucencies, underlying

infection is not excluded. Correlate clinically. Stimulator leads are

seen in the lower thoracic spine.

Small left and moderate right pleural effusion with bilateral lower lobe

consolidation.

The aorta is normal in caliber. No acute findings in the visualized

abdomen.  The visualized lungs are clear. 





IMPRESSION: 

There is diffuse osteopenia degraded evaluation of the study.

Multilevel degenerative changes are noted. Postop changes of posterior

fusion are seen at L1-L2 on the right with pedicle screws and fixation

bar. Advanced degenerative changes are seen at L2-L3 with disc space

loss, endplate osteophytes and endplate sclerosis/lucencies, underlying

infection is not excluded. Correlate clinically. Stimulator leads are

seen in the lower thoracic spine.

Small left and moderate right pleural effusion with bilateral lower lobe

consolidation..

## 2024-12-15 NOTE — PN
[facility] PROGRESS NOTE








Date of Service: Dec 15, 2024 


Time of Service: 11:07





SUBJECTIVE:





12/15 Pt seen at bedside, no acute events overnight.  She is currently not 

needing pressors or intubation however she is somnolent with rigors suggesting 

severe infection.  Will continue with current care, broad spectrum antibiotics 

and ICU care.  Appreciate nephrology recommendations.





REVIEW OF SYSTEMS


12 point ROS negative unless noted in HPI





PHYSICAL EXAM


GENERAL APPEARANCE:  The patient is awake, alert, and oriented, in no acute 

cardiopulmonary distress.


NEUROLOGICAL:  Cranial nerves II-XII grossly intact.  Motor is 5/5 in bilateral 

upper and lower extremities proximal to distal.  No sensory deficits.


HEENT:  Face is symmetric. Pupils are equal and reactive.  Extraocular movements

are intact.


NECK:  Supple.  No JVD. No thyromegaly. No submental, submandibular, pre-

/postauricular, occipital or supraclavicular lymphadenopathy.


CHEST:  Normal chest expansion. No Telemetry.


LUNGS:  Absence of any rales, rhonchi or any wheezing.


CARDIOVASCULAR:  Regular.  S1 and S2 normal.  No appreciable rubs, murmurs or 

gallops. 


ABDOMEN:  Soft, nontender, and nondistended.  There is no rebound, voluntary 

guarding, or rigidity.


:  Deferred. No Matos.


EXTREMITIES:  Non-edematous and not cyanotic.  No clubbing.  Good capillary 

refill.


SKIN:  No skin breakdown.





                             Vital Signs (last 8hr)








  Date Time  Temp Pulse Resp B/P (MAP) Pulse Ox O2 Delivery O2 Flow Rate FiO2


 


12/15/24 06:39   16   N/Cannula Low lpm 3.0 32


 


12/15/24 06:39  102 16     


 


12/15/24 04:00 97.9 97 15 144/89 97 Nasal Cannula 3.0 32


 


12/15/24 04:00     98 Nasal Cannula* 3 32











LABS:








                                   Laboratory:








Test


 12/15/24


04:09 12/15/24


02:45 12/15/24


00:15 12/14/24


21:33 Range/Units


 


 


White Blood Count 14.5 H    4.8-10.8  K/uL


 


Red Blood Count


 3.42 L


 


 


 


 4.00-5.50


MIL/uL


 


Hemoglobin 10.6 L    12.0-16.0  g/dL


 


Hematocrit 33.4 L    36-48  %


 


Mean Corpuscular Volume 97.7     79-99  fL


 


Mean Corpuscular Hemoglobin 31.0     27.0-33.0  pg


 


Mean Corpuscular Hemoglobin


Concent 31.7 L


 


 


 


 32.0-36.0  g/dL





 


Red Cell Distribution Width 14.8     11.0-15.5  %


 


Platelet Count 292     130-400  K/uL


 


Mean Platelet Volume 11.5 H    7.5-10.5  fL


 


Immature Granulocyte % (Auto) 0.5     0-1  %


 


Neutrophils (%) (Auto) 92.9 H    40.0-77.0  %


 


Lymphocytes (%) (Auto) 5.1 L    21.0-51.0  %


 


Monocytes (%) (Auto) 1.4 L    3.0-13.0  %


 


Eosinophils (%) (Auto) 0.0     0.0-8.0  %


 


Basophils (%) (Auto) 0.1     0.0-5.0  %


 


Neutrophils # (Auto) 13.5 H    1.8-7.7  K/uL


 


Lymphocytes # (Auto) 0.7 L    1.0-4.8  K/uL


 


Monocytes # (Auto) 0.2     0.1-1.0  K/uL


 


Eosinophils # (Auto) 0.00     0.00-0.70  K/uL


 


Basophils # (Auto) 0.01     0.00-0.20  K/uL


 


Absolute Immature Granulocyte


(auto 0.07 


 


 


 


 0-1  K/uL





 


Nucleated Red Blood Cells 0.0     0.0-0.19  %


 


White Cell Morphology Comment See comments      


 


Sodium Level 144     136-145  mmol/L


 


Potassium Level 3.6     3.5-5.1  mmol/L


 


Chloride Level 111     101-111  mmol/L


 


Carbon Dioxide Level 22     21-32  mmol/L


 


Blood Urea Nitrogen 62 H    7-18  mg/dL


 


Creatinine 3.4 H    0.5-1.0  mg/dL


 


Glomerular Filtration Rate


Calc 14 


 


 


 


 >90  mL/min





 


Random Glucose 162 H      mg/dL


 


Hemoglobin A1c 9.7 H    4.0-6.0  %


 


Estimated Average Glucose


(eAG) 232 H


 


 


 


   mg/dL





 


Total Calcium 7.8 L    8.5-10.1  mg/dL


 


Phosphorus Level 7.3 H    2.5-4.9  mg/dL


 


Magnesium Level


 1.90 


 


 


 


 1.80-2.40


mg/dL


 


Urine Random Creatinine  25.77 L     mg/dL


 


Urine Random Sodium  67      mmol/l


 


Whole Blood Glucose   146 H    MG/DL


 


Lactic Acid Level    1.6  0.8-2.5  mmol/L


 


Thyroid Stimulating Hormone


(TSH) 


 


 


 3.15 


 0.36-3.74


uIU/mL


 


Acetaminophen Level    6 L 10-30  mcg/mL


 


Test


 12/14/24


21:16 12/14/24


19:00 12/14/24


17:55 12/14/24


17:36 Range/Units


 


 


Blood Gas Specimen Type Arterial      


 


Arterial Blood pH 7.341 L    7.350-7.450  


 


Arterial Blood Partial


Pressure CO2 32 


 


 


 


 32-45  mmHg





 


Arterial Blood Partial


Pressure O2 92.5 


 


 


 


 83.0-108.0


mmHg


 


Arterial Blood HCO3


 16.9 L


 


 


 


 21.0-28.0


mmol/L


 


Arterial Blood Oxygen


Saturation 96.8 


 


 


 


 94.0-98.0  %





 


Arterial Blood Base Excess


 -7.6 L


 


 


 


 -2.0-3.0


mmol/L


 


Blood Gas Temperature


 37.0 


 


 


 


 35.5-37.0


CELSIUS


 


FiO2 28.0      %


 


Blood Gas Specimen Comment RR RN      


 


Blood Gas Flow-by


 


 2.00 


 


 


 0.00-15.00


L/min


 


Blood Gas Vent Mode  NC    ROOM AIR  


 


Urine Color   LIGHT-YELLOW   YELLOW  


 


Urine Appearance   CLOUDY H  CLEAR  


 


Urine pH   6.0   5.0-8.0  


 


Urine Specific Gravity   1.014   1.001-1.031  


 


Urine Protein   600 H  NEGATIVE  mg/dL


 


Urine Glucose (UA)   500 H  NEGATIVE  mg/dL


 


Urine Ketones   NEGATIVE   NEGATIVE  mg/dL


 


Urine Occult Blood   NEGATIVE   NEGATIVE  


 


Urine Nitrate   NEGATIVE   NEGATIVE  


 


Urine Bilirubin   NEGATIVE   NEGATIVE  mg/dL


 


Urine Urobilinogen   0.2   0.2-1.0  mg/dL


 


Urine Leukocyte Esterase


 


 


 NEGATIVE 


 


 NEGATIVE


Sanaz/uL


 


Urine RBC   0-1   0-1  /HPF


 


Urine WBC   2-5 H  0-1  /HPF


 


Urine Squamous Epithelial


Cells 


 


 RARE 


 


 0-2  /HPF





 


Urine Bacteria   RARE   None Seen  /HPF


 


Urine Opiates Screen   NEGATIVE   NEGATIVE  


 


Urine Barbiturates Screen   NEGATIVE   NEGATIVE  


 


Urine Phencyclidine Screen   NEGATIVE   NEGATIVE  


 


Urine Amphetamines Screen   NEGATIVE   NEGATIVE  


 


Urine Benzodiazepines Screen   NEGATIVE   NEGATIVE  


 


Urine Cocaine Screen   NEGATIVE   NEGATIVE  


 


Urine Marijuana (THC) Screen   NEGATIVE   NEGATIVE  


 


Ammonia    15  11-32  umol/L


 


Troponin I High Sensitivity    9  4-50  ng/L


 


B-Type Natriuretic Peptide    611 H 0-100  pg/mL


 


Serum Alcohol    < 3  0-10  mg/dL











                               Current Medications








 Medications


  (Trade)  Dose


 Ordered  Sig/Chaim


 Route


 PRN Reason  Start Time


 Stop Time Status Last Admin


Dose Admin


 


 Acetaminophen


  (TYLenol 325MG


 TAB)  650 mg  Q6H  PRN


 PO


 TEMPERATURE GREATER THAN 101.5  12/14/24 23:00


 1/13/25 22:59   





 


 Albuterol


  (DUOneb)  1 UDVIAL  [id number]


 IH


   12/15/24 00:00


 1/14/25 00:00  12/15/24 06:38


1 UDVIAL


 


 Albuterol Sulfate


  (Proventil


 0.083% 2.5mg/3ml)  2.5MG  Q4H  PRN


 IH


 SHORTNESS OF BREATH  12/14/24 23:00


 1/13/25 22:59   





 


 Azithromycin  250 ml @ 


 250 mls/hr  Q24H  STAT


 IVPB


   12/14/24 20:16


 12/14/24 21:15 DC 12/14/24 20:42


250 MLS/HR


 


 Calcium Gluconate


 1 gm/Sodium


 Chloride  100 ml @ 0


 mls/hr  PROTOCOL


 IV


   12/14/24 22:00


 1/13/25 21:59   





 


 Calcium Gluconate


 1 gm/Sodium


 Chloride  100 ml @ 0


 mls/hr  PROTOCOL


 IV


   12/15/24 05:00


 12/15/24 04:51 DC  





 


 Carvedilol


  (Coreg 6.25MG)  6.25 mg  BIDMEALS


 PO


   12/14/24 22:00


 1/13/25 21:59   





 


 Ceftriaxone Sodium


  (ROCEphine 1G


 INJ)  1 gm  ONCE  STAT


 IVPB


   12/14/24 20:16


 12/14/24 20:18 DC 12/14/24 20:42


1 GM


 


 Dextrose


  (D50w)  50 ml  AD  PRN


 IV


 HYPOGLYCEMIA PROTOCOL  12/14/24 21:30


 1/13/25 21:29   





 


 Doxycycline


 Hyclate  250 ml @ 


 125 mls/hr  Q12H


 IV


   12/15/24 12:00


 12/15/24 10:45 DC  





 


 Famotidine


  (Pepcid 20mg


 Vial)  20 mg  DAILY


 IV


   12/15/24 09:00


 1/14/25 08:59  12/15/24 09:36


20 MG


 


 Furosemide


  (LASix 20MG VIAL)  20 mg  Q8H


 IV


   12/14/24 21:30


 1/13/25 21:29  12/15/24 04:37


20 MG


 


 Glucagon


  (Glucagon 1mg


 Kit)  1 mg  AD  PRN


 IM


 HYPOGLYCEMIA PROTOCOL  12/14/24 21:30


 1/13/25 21:29   





 


 Heparin Sodium


  (Porcine)


  (HEParin 5,000


 UNIT VIAL)  5,000 unit  BID


 SQ


   12/15/24 09:00


 1/14/25 08:59  12/15/24 09:43


5,000 UNIT


 


 Heparin Sodium


  (Porcine)


  (HEParin 5,000


 UNIT VIAL)  5,000 unit  BID


 SQ


   12/15/24 09:00


 12/14/24 23:12 DC  





 


 Hydralazine HCl


  (APRESOLine 20MG


 INJ)  10 mg  Q6H  PRN


 IV


 For:SBP above 160;DBP above 90  12/14/24 23:00


 1/13/25 22:59   





 


 Insulin Human


 Regular


  (humuLIN R 100


 UNIT/ML 3ML)  INSULIN


 SLIDING


 SCAL...  Q6H6


 SQ


   12/15/24 00:00


 1/14/25 00:00   





 


 Levofloxacin/


 Dextrose  50 ml @ 50


 mls/hr  Q48H


 IVPB


   12/15/24 11:00


 12/25/24 10:59   





 


 Levofloxacin/


 Dextrose  100 ml @ 


 100 mls/hr  Q24H


 IV


   12/15/24 11:00


 12/15/24 10:53 DC  





 


 Lorazepam


  (AtiVAN)  1 mg  ONCE  STAT


 IVP


   12/14/24 17:39


 12/14/24 17:40 DC 12/14/24 17:46


1 MG


 


 Meropenem


  (Merrem)  500 mg  Q12H


 IVPB


   12/15/24 11:00


 12/25/24 10:59   





 


 Meropenem 500 mg/


 Sodium Chloride  100 ml @ 


 33.333 mls/


 hr  Q12H


 IV


   12/15/24 10:30


 12/15/24 10:48 DC  





 


 Methylprednisolone


 Sodium Succinate


  (Solu-medROL


 40MG)  40 mg  BID


 IVP


   12/15/24 09:00


 12/15/24 10:45 DC 12/15/24 09:36


40 MG


 


 Ondansetron HCl


  (zoFRAN 4MG INJ)  4 mg  Q6H  PRN


 IV


 NAUSEA/VOMITING  12/14/24 23:00


 1/13/25 22:59   





 


 Piperacillin Sod/


 Tazobactam Sod


  (Zosyn


 3.375gm+NS 50ml)  3.375 gm  Q12H


 IVPB


   12/14/24 21:00


 12/15/24 10:45 DC 12/15/24 09:36


3.375 GM


 


 Sodium Chloride  1,000 ml @ 


 500 mls/hr  Q2H STAT


 IV


   12/14/24 20:04


 12/14/24 22:03 DC 12/14/24 20:40


500 MLS/HR


 


 Sodium Chloride


  (NS 50ml)  50 ml  AD


 IV


   12/15/24 00:00


 12/14/24 21:15 DC  





 


 Vancomycin HCl


  (Vancomycin


 Protocol)  1 each  AD


 IV


   12/15/24 10:30


 12/29/24 10:29   














DIAGNOSTICS / RADIOLOGY:


[ ]





ASSESSMENT:





Metabolic encephalopathy


Sepsis without shock


Pneumonia


Chronic renal failure. 


Hypokalemia, POA


Diabetes mellitus type 2 last A1C 9.7


Hypertension


Obesity BMI 31.7


PAD both lower extremities


Right pleural effusion with mild interstitial fibrosis





PLAN:





- Continue ICU


- Continue broad spectrum antibiotics, Vancomycin, meropenem, levofloxacin


- Continue sliding scale insulin


- Continue hypoglycemia protocol


- Continue furosemide 20mg TID


- Critical care consulted, appreciate recommendations





Disposition: pending improvement in clinical status











YAW WEINBERG MD            Dec 15, 2024 11:10

## 2024-12-15 NOTE — PN
[facility] PROGRESS NOTE 





Date Patient Seen: Dec 15, 2024 


Time of Visit: 10:48


Supervising Physician: Rajan Reed





Primary Care Physician: Jonathan Keyes


Outpatient Specialists: [facility]


Inpatient Consults: Rajan Reed





PROBLEM LIST:


Acute hypoxic respiratory failure 


Right lower lobe pneumonia- possible HAP 


Sepsis with organ damage:  Renal POA


Right pleural effusion


Acute metabolic encephalopathy


Acute renal failure secondary to ATN on chronic kidney disease


Acute on chronic CHF with preserved ejection fraction LVEF 54% 


Hyperglycemia in the setting of type 2 diabetes mellitus uncontrolled


History of diabetes mellitus, hypertension, hyperlipidemia, CHF, CKD, asthma, 

fem-pop, back surgery





INTERVAL HISTORY:


12/15 patient is awake, alert, knows her name and her daughter only. She is diso

riented to time, place, and situation.  Overall patient is not in acute 

distress, dry lips, no tachypnea. Vital signs morning with blood pressure 

144/89, heart rate is 97, pulse is 97.  She remains on 3 L nasal cannula with 

sats of 98%.  Lab this morning with the WBC of 14 up from 12 hemoglobin is 10.6 

platelet count is 290.  Chemistry with bicarb of 22 creatinine is 3.4 this is do

wn from 3.5 yesterday ammonia level is 15, glucose is 163.  Chest x-ray this 

morning with right pleural effusion and infiltrates.  Given recent 

hospitalization, we will up antibiotic coverage to HAP with meropenem and 

vancomycin. Add levaquin as well. Send MRSA swab and sputum culture. MRI of the 

brain is pending. No need for repeat Echo. No seizure. We can downgrade to PCCU.





REVIEW OF SYSTEMS: 


12 point ROS reviewed with patient. Pertinent positives mentioned above. 

Otherwise negative.





PHYSICAL EXAM: 


GENERAL: alert time one


HEENT: EOMI, Sclera non icteric, moist mucosa 


NECK: Supple, no JVD, trachea midline 


LUNGS:  Coarse crackles on lower lobes. No wheezes


HEART: Regular rate and rhythm. Normal S1 and S2, without murmurs 


ABD: Abdomen soft, nontender. Bowel sounds present


EXT: No clubbing cyanosis or edema


NEURO: Alert , no unilateral weakness.





                             Vital Signs (last 8hr)








  Date Time  Temp Pulse Resp B/P (MAP) Pulse Ox O2 Delivery O2 Flow Rate FiO2


 


12/15/24 06:39   16   N/Cannula Low lpm 3.0 32


 


12/15/24 06:39  102 16     


 


12/15/24 04:00 97.9 97 15 144/89 97 Nasal Cannula 3.0 32


 


12/15/24 04:00     98 Nasal Cannula* 3 32


 


12/15/24 03:00  94 13 131/83 96 Nasal Cannula 3.0 32











LABS:





                                Hematology Labs:








Test


 12/15/24


04:09 Range/Units


 


 


White Blood Count 14.5 H 4.8-10.8  K/uL


 


Red Blood Count


 3.42 L


 4.00-5.50


MIL/uL


 


Hemoglobin 10.6 L 12.0-16.0  g/dL


 


Hematocrit 33.4 L 36-48  %


 


Mean Corpuscular Volume 97.7  79-99  fL


 


Mean Corpuscular Hemoglobin 31.0  27.0-33.0  pg


 


Mean Corpuscular Hemoglobin


Concent 31.7 L


 32.0-36.0  g/dL





 


Red Cell Distribution Width 14.8  11.0-15.5  %


 


Platelet Count 292  130-400  K/uL


 


Mean Platelet Volume 11.5 H 7.5-10.5  fL


 


Immature Granulocyte % (Auto) 0.5  0-1  %


 


Neutrophils (%) (Auto) 92.9 H 40.0-77.0  %


 


Lymphocytes (%) (Auto) 5.1 L 21.0-51.0  %


 


Monocytes (%) (Auto) 1.4 L 3.0-13.0  %


 


Eosinophils (%) (Auto) 0.0  0.0-8.0  %


 


Basophils (%) (Auto) 0.1  0.0-5.0  %


 


Neutrophils # (Auto) 13.5 H 1.8-7.7  K/uL


 


Lymphocytes # (Auto) 0.7 L 1.0-4.8  K/uL


 


Monocytes # (Auto) 0.2  0.1-1.0  K/uL


 


Eosinophils # (Auto) 0.00  0.00-0.70  K/uL


 


Basophils # (Auto) 0.01  0.00-0.20  K/uL


 


Absolute Immature Granulocyte


(auto 0.07 


 0-1  K/uL





 


Nucleated Red Blood Cells 0.0  0.0-0.19  %


 


White Cell Morphology Comment See comments   








                                 Chemistry Labs:








Test


 12/15/24


04:09 12/15/24


00:15 12/14/24


21:33 12/14/24


17:36 Range/Units


 


 


Sodium Level 144     136-145  mmol/L


 


Potassium Level 3.6     3.5-5.1  mmol/L


 


Chloride Level 111     101-111  mmol/L


 


Carbon Dioxide Level 22     21-32  mmol/L


 


Blood Urea Nitrogen 62 H    7-18  mg/dL


 


Creatinine 3.4 H    0.5-1.0  mg/dL


 


Glomerular Filtration Rate


Calc 14 


 


 


 


 >90  mL/min





 


Random Glucose 162 H      mg/dL


 


Hemoglobin A1c 9.7 H    4.0-6.0  %


 


Estimated Average Glucose


(eAG) 232 H


 


 


 


   mg/dL





 


Total Calcium 7.8 L    8.5-10.1  mg/dL


 


Phosphorus Level 7.3 H    2.5-4.9  mg/dL


 


Magnesium Level


 1.90 


 


 


 


 1.80-2.40


mg/dL


 


Whole Blood Glucose  146 H     MG/DL


 


Lactic Acid Level   1.6   0.8-2.5  mmol/L


 


Thyroid Stimulating Hormone


(TSH) 


 


 3.15 


 


 0.36-3.74


uIU/mL


 


Ammonia    15  11-32  umol/L


 


Troponin I High Sensitivity    9  4-50  ng/L


 


B-Type Natriuretic Peptide    611 H 0-100  pg/mL











DIAGNOSTICS / RADIOLOGY RESULTS:


[ ]





PLAN





NEURO: 


Minimize central acting medications as possible. 


Fall Precautions.


Well lighted room through the day and minimize interruptions through the night 

to prevent acute delirium. 





PULMONARY: 


Supplemental 02 as needed


Titrate Fio2 to keep Spo2 > or = 90% 


DuoNebs and CPT as needed


DuoNebs every 8 hours


Repeat ABG stat


IS hourly while awake for pulmonary hygiene


Out of bed to chair as tolerated 


VAP Bundle 








CARDIOVASCULAR: 


Follow hemodynamics. 


Titrate vasopressor to keep MAP >65 or systolic blood pressure >95mmHg 


Obtain 2D echo cardiology to read


Start Coreg 6.25 mg p.o. b.i.d. and Lasix 20 mg IV every 8 hours


Consult patient cardiologist





DRIPS: 


NA





LINES: 


PIV





GI & NUTRITION:


Continue nutritional support


Keep patient NPO


Aspirations precautions


Prokinetic agents and laxatives as needed 





KIDNEYS & ELECTROLYTES: 


Strict monitoring of intake and output 


Consult patient nephrologist


Daily weights 


Avoid nephrotoxic agents


Monitor electrolytes and replace as needed


Goal urine output of 30mL/hr or 0.5mL/kg/hr 


   





ENDOCRINE:


Maintain blood glucose between 100-180 at all times.


Insulin sliding scale for blood glucose management 


Obtain hemoglobin A1c





INFECTIOUS DISEASE: 


Trend temperature. 


Pan-culture if febrile.


Status Zosyn 3.375 g IV every 8 hours and doxycycline 100 mg IV every 12 hours


Blood culture x2





Micro: 


Sputum


Blood


Urine





Antibiotics: 


Merem


Vanc


Levaquin





HEMATOLOGY & COAGULATION: 


Monitor H&H.  Keep Hgb > 7


Transfuse 1 unit of PRBC for Hgb < 7


Transfuse 1 pack of platelets of platelets < 20, 000


Watch for any signs and symptoms of bleeding





SKIN:


Pressure ulcer prevention per facility protocol


Turn patient every 2 hours





Rehab: PT/OT 


Prophylaxis:


GI: [Famotidine ]


DVT:  [SCDs ]





Code Status: Full Resuscitation


Disposition: PCCU





Other:


Total patient care time exceeds 35 minutes excluding all procedures.








Case was discussed and seen with my supervising physician Dr DANE Tolentino.  The 

above plan was formulated and agreed upon. 














TANNER PERRY [facility]        Dec 15, 2024 10:59

## 2024-12-16 VITALS
OXYGEN SATURATION: 97 % | RESPIRATION RATE: 24 BRPM | HEART RATE: 93 BPM | SYSTOLIC BLOOD PRESSURE: 145 MMHG | TEMPERATURE: 99.5 F | DIASTOLIC BLOOD PRESSURE: 85 MMHG

## 2024-12-16 VITALS — RESPIRATION RATE: 19 BRPM | HEART RATE: 95 BPM | SYSTOLIC BLOOD PRESSURE: 154 MMHG | DIASTOLIC BLOOD PRESSURE: 78 MMHG

## 2024-12-16 VITALS
DIASTOLIC BLOOD PRESSURE: 82 MMHG | HEART RATE: 85 BPM | TEMPERATURE: 97.9 F | SYSTOLIC BLOOD PRESSURE: 145 MMHG | RESPIRATION RATE: 14 BRPM

## 2024-12-16 VITALS — DIASTOLIC BLOOD PRESSURE: 84 MMHG | RESPIRATION RATE: 17 BRPM | HEART RATE: 89 BPM | SYSTOLIC BLOOD PRESSURE: 167 MMHG

## 2024-12-16 VITALS — DIASTOLIC BLOOD PRESSURE: 86 MMHG | SYSTOLIC BLOOD PRESSURE: 170 MMHG | HEART RATE: 83 BPM | RESPIRATION RATE: 10 BRPM

## 2024-12-16 VITALS — HEART RATE: 84 BPM | SYSTOLIC BLOOD PRESSURE: 140 MMHG | DIASTOLIC BLOOD PRESSURE: 78 MMHG | RESPIRATION RATE: 10 BRPM

## 2024-12-16 VITALS
RESPIRATION RATE: 23 BRPM | SYSTOLIC BLOOD PRESSURE: 158 MMHG | HEART RATE: 99 BPM | TEMPERATURE: 98.5 F | DIASTOLIC BLOOD PRESSURE: 85 MMHG

## 2024-12-16 VITALS — HEART RATE: 89 BPM | RESPIRATION RATE: 25 BRPM | DIASTOLIC BLOOD PRESSURE: 82 MMHG | SYSTOLIC BLOOD PRESSURE: 155 MMHG

## 2024-12-16 VITALS
HEART RATE: 84 BPM | SYSTOLIC BLOOD PRESSURE: 184 MMHG | RESPIRATION RATE: 10 BRPM | DIASTOLIC BLOOD PRESSURE: 105 MMHG | TEMPERATURE: 98.1 F

## 2024-12-16 VITALS
HEART RATE: 97 BPM | OXYGEN SATURATION: 95 % | SYSTOLIC BLOOD PRESSURE: 161 MMHG | DIASTOLIC BLOOD PRESSURE: 94 MMHG | RESPIRATION RATE: 17 BRPM

## 2024-12-16 VITALS — SYSTOLIC BLOOD PRESSURE: 175 MMHG | RESPIRATION RATE: 13 BRPM | HEART RATE: 85 BPM | DIASTOLIC BLOOD PRESSURE: 68 MMHG

## 2024-12-16 VITALS
OXYGEN SATURATION: 95 % | SYSTOLIC BLOOD PRESSURE: 116 MMHG | TEMPERATURE: 97.7 F | RESPIRATION RATE: 15 BRPM | HEART RATE: 80 BPM | DIASTOLIC BLOOD PRESSURE: 88 MMHG

## 2024-12-16 VITALS — HEART RATE: 93 BPM | SYSTOLIC BLOOD PRESSURE: 156 MMHG | RESPIRATION RATE: 26 BRPM | DIASTOLIC BLOOD PRESSURE: 79 MMHG

## 2024-12-16 VITALS
SYSTOLIC BLOOD PRESSURE: 145 MMHG | DIASTOLIC BLOOD PRESSURE: 74 MMHG | OXYGEN SATURATION: 97 % | RESPIRATION RATE: 15 BRPM | TEMPERATURE: 99.6 F | HEART RATE: 97 BPM

## 2024-12-16 VITALS — SYSTOLIC BLOOD PRESSURE: 145 MMHG | HEART RATE: 88 BPM | DIASTOLIC BLOOD PRESSURE: 49 MMHG | RESPIRATION RATE: 13 BRPM

## 2024-12-16 VITALS — HEART RATE: 85 BPM | RESPIRATION RATE: 16 BRPM | SYSTOLIC BLOOD PRESSURE: 136 MMHG | DIASTOLIC BLOOD PRESSURE: 90 MMHG

## 2024-12-16 VITALS
SYSTOLIC BLOOD PRESSURE: 164 MMHG | DIASTOLIC BLOOD PRESSURE: 68 MMHG | HEART RATE: 93 BPM | RESPIRATION RATE: 15 BRPM | TEMPERATURE: 97.9 F

## 2024-12-16 VITALS — HEART RATE: 106 BPM | DIASTOLIC BLOOD PRESSURE: 99 MMHG | RESPIRATION RATE: 11 BRPM | SYSTOLIC BLOOD PRESSURE: 159 MMHG

## 2024-12-16 VITALS — SYSTOLIC BLOOD PRESSURE: 142 MMHG | RESPIRATION RATE: 26 BRPM | DIASTOLIC BLOOD PRESSURE: 79 MMHG | HEART RATE: 103 BPM

## 2024-12-16 VITALS — RESPIRATION RATE: 16 BRPM | HEART RATE: 82 BPM

## 2024-12-16 VITALS — HEART RATE: 82 BPM | RESPIRATION RATE: 12 BRPM | DIASTOLIC BLOOD PRESSURE: 89 MMHG | SYSTOLIC BLOOD PRESSURE: 174 MMHG

## 2024-12-16 VITALS — HEART RATE: 84 BPM | RESPIRATION RATE: 16 BRPM

## 2024-12-16 VITALS — DIASTOLIC BLOOD PRESSURE: 66 MMHG | HEART RATE: 96 BPM | SYSTOLIC BLOOD PRESSURE: 152 MMHG | RESPIRATION RATE: 21 BRPM

## 2024-12-16 VITALS — HEART RATE: 93 BPM | DIASTOLIC BLOOD PRESSURE: 79 MMHG | RESPIRATION RATE: 15 BRPM | SYSTOLIC BLOOD PRESSURE: 163 MMHG

## 2024-12-16 VITALS — RESPIRATION RATE: 16 BRPM | HEART RATE: 83 BPM

## 2024-12-16 VITALS — HEART RATE: 92 BPM | OXYGEN SATURATION: 99 % | RESPIRATION RATE: 16 BRPM

## 2024-12-16 LAB
BUN SERPL-MCNC: 68 MG/DL (ref 7–18)
CELLS COUNTED: 100
CHLORIDE SERPL-SCNC: 113 MMOL/L (ref 101–111)
CO2 SERPL-SCNC: 23 MMOL/L (ref 21–32)
CREAT SERPL-MCNC: 3.4 MG/DL (ref 0.5–1)
ERYTHROCYTE [DISTWIDTH] IN BLOOD BY AUTOMATED COUNT: 15.2 % (ref 11–15.5)
GFR SERPL CREATININE-BSD FRML MDRD: 14 ML/MIN (ref 90–?)
GLUCOSE SERPL-MCNC: 117 MG/DL (ref 70–105)
HCT VFR BLD AUTO: 32 % (ref 36–48)
LYMPHOCYTES NFR BLD MANUAL: 6 % (ref 22–44)
MAGNESIUM SERPL-MCNC: 1.8 MG/DL (ref 1.8–2.4)
MANUAL DIF COMMENT BLD-IMP: (no result)
MCH RBC QN AUTO: 30.9 PG (ref 27–33)
MCHC RBC AUTO-ENTMCNC: 31.9 G/DL (ref 32–36)
MCV RBC AUTO: 97 FL (ref 79–99)
MONOCYTES NFR BLD MANUAL: 5 % (ref 2–9)
NEUTS BAND NFR BLD MANUAL: 1 % (ref 0–2)
NEUTS SEG NFR BLD MANUAL: 88 % (ref 40–70)
NRBC BLD MANUAL-RTO: 0 % (ref 0–0.19)
PLAT MORPH BLD: ADEQUATE
PLATELET # BLD AUTO: 259 K/UL (ref 130–400)
POTASSIUM SERPL-SCNC: 3.4 MMOL/L (ref 3.5–5.1)
RBC # BLD AUTO: 3.3 MIL/UL (ref 4–5.5)
RBC MORPH BLD: (no result)
SODIUM SERPL-SCNC: 147 MMOL/L (ref 136–145)
WBC # BLD AUTO: 18.5 K/UL (ref 4.8–10.8)
WBC MORPH BLD: (no result)

## 2024-12-16 RX ADMIN — POTASSIUM CHLORIDE ONE MEQ: 1.5 SOLUTION ORAL at 09:18

## 2024-12-16 NOTE — [facility]
MR BRAIN WO CON



HISTORY: Altered mental status



COMPARISON: None



TECHNIQUE: MRI of the brain was performed utilizing multiple pulse

sequences in axial, coronal and sagittal planes. Patient was not given

contrast through intravenous route.



FINDINGS: The ventricles and extraventricular CSF spaces are dilated

consistent with cerebral atrophy. Nonspecific white matter changes are

seen. There is no midline shift, mass effect or herniation. No subacute

hemorrhage is seen. No MR evidence of acute infarct is seen in the

diffusion weighted images. Cerebellar tonsils are in normal position.

There are bilateral ethmoid and sphenoid sinusitis with mucoperiosteal

thickening.  No MR evidence of a mass lesion is seen in this noncontrast

study.



IMPRESSION: 

1. No MR evidence of acute infarct is seen in the diffusion weighted

images. Atrophy with white matter changes.

## 2024-12-16 NOTE — [facility]
CT CHEST W/O CONTRAST



HISTORY:   Pneumonia



COMPARISON: None



TECHNIQUE: Multiple sequential axial images of the chest were obtained

from the thoracic inlet through upper abdomen. Patient was not given

contrast through intravenous route.



FINDINGS: There are bilateral pleural effusions with compressive

atelectasis. Coronary arterial calcifications are seen. Gallbladder is

distended. There is mild anasarca.  No pleural effusion or pericardial

effusion is seen. There is no evidence of pneumothorax. There are normal

size mediastinal and hilar lymph nodes. The heart is not enlarged.

Degenerative changes of the thoracolumbar spine are present.  There is

no evidence of adrenal nodule.



IMPRESSION: 

1. Mild to moderate bilateral pleural effusions with compressive

atelectasis.









CT was performed with one or more following dose reduction techniques:

automated exposure control, adjustment of the mA and kv according to

patient's size, or use of a iterative reconstruction technique.

## 2024-12-16 NOTE — PN
NEPHROLOGY PROGRESS NOTE








Date/Time Patient Seen: Dec 16, 2024





SUBJECTIVE:


This is a 69 year old female with a past medical history of congestive heart 

failure history, hypertension, hyperlipidemia and chronic low back pain. 


She presented to the emergency room with complaints of altered mental status.


Patient has a recent hospital admission for similar reason and was discharged on

12/11/2024


Blood culture has been negative times 24 hours.  


She continues on broad-spectrum antibiotics, including vancomycin.


She was noted to have elevated BUN/creatinine.  


We are consulted for renal failure.  


Renal function has remained stable 


Electrolytes are stable.  


She was seen in the ICU


Condition is critical and guarded





REVIEW OF SYSTEMS:


GENERAL:  Positive for generalized weakness


NEUROLOGIC: Negative for any blurry vision, blind spots, double vision, facial 

asymmetry, dysphagia, dysarthria, hemiparesis, hemisensory deficits, vertigo, 

ataxia.


HEENT: Negative for any head trauma, neck trauma, neck stiffness, photophobia, 

phonophobia, sinusitis, rhinitis.


CARDIAC: Negative for any chest pain, dyspnea on exertion, paroxysmal nocturnal 

dyspnea, peripheral edema.


PULMONARY: Negative for any shortness of breath, wheezing, COPD, or TB exposure.


GASTROINTESTINAL: Negative for any abdominal pain, nausea, vomiting, bright red 

blood per rectum, melena.


GENITOURINARY: Negative for any dysuria, hematuria, incontinence.


INTEGUMENTARY: Negative for any rashes, cuts, insect bites.


RHEUMATOLOGIC: Negative for any joint pains, photosensitive rashes, history of 

vasculitis or kidney problems.


HEMATOLOGIC: Negative for any abnormal bruising, frequent infections or 

bleeding.





                             Vital Signs (last 8hr)








  Date Time  Temp Pulse Resp B/P (MAP) Pulse Ox O2 Delivery O2 Flow Rate FiO2


 


12/16/24 12:03  82 16     


 


12/16/24 12:00 98.4 99 23 158/85 94 Room Air  


 


12/16/24 11:00  83 10 170/86 93 Room Air  


 


12/16/24 10:00  82 12 174/89 96 Room Air  


 


12/16/24 09:00  93 15 163/79 94 Room Air  


 


12/16/24 08:16    164/68    


 


12/16/24 08:00     95 Room Air* 0 21


 


12/16/24 08:00  97 17 161/94 93 Room Air  


 


12/16/24 07:00 97.9 93 15 164/68 90 Nasal Cannula 2.0 


 


12/16/24 06:38  92 16     


 


12/16/24 06:38  92 16   N/Cannula Low lpm 2.0 28








PHYSICAL EXAM:


GENERAL: Alert and oriented x 3. No acute distress. Well-nourished.


EYES: EOMI. Anicteric.


HENT: Moist mucous membranes. No scleral icterus. No cervical lymphadenopathy.


LUNGS: Clear to auscultation bilaterally. No accessory muscle use.


CARDIOVASCULAR: Regular rate and rhythm. No murmur. No JVD.


ABDOMEN: Soft, non-tender and non-distended. No palpable masses.


EXTREMITIES: No edema. Non-tender.


SKIN: No rashes or lesions. Warm.


NEUROLOGIC: No focal neurological deficits. CN II-XII grossly intact, but not 

individually tested.


PSYCHIATRIC: Cooperative. Appropriate mood and affect.








                               Current Medications








 Medications


  (Trade)  Dose


 Ordered  Sig/Chaim


 Route


 PRN Reason  Start Time


 Stop Time Status Last Admin


Dose Admin


 


 Acetaminophen


  (TYLenol 325MG


 TAB)  650 mg  Q6H  PRN


 PO


 TEMPERATURE GREATER THAN 101.5  12/14/24 23:00


 12/15/24 17:42 DC 12/15/24 17:35


650 MG


 


 Acetaminophen


  (TYLenol 325MG


 TAB)  650 mg  Q6H  PRN


 PO


 MILD PAIN (1-3)  12/15/24 17:30


 1/14/25 17:29  12/16/24 08:15


650 MG


 


 Albuterol


  (DUOneb)  1 UDVIAL  [id number]


 IH


   12/15/24 00:00


 1/14/25 00:00  12/16/24 12:02


1 UDVIAL


 


 Albuterol Sulfate


  (Proventil


 0.083% 2.5mg/3ml)  2.5MG  Q4H  PRN


 IH


 SHORTNESS OF BREATH  12/14/24 23:00


 1/13/25 22:59   





 


 Azithromycin  250 ml @ 


 250 mls/hr  Q24H  STAT


 IVPB


   12/14/24 20:16


 12/14/24 21:15 DC 12/14/24 20:42


250 MLS/HR


 


 Calcium Gluconate


 1 gm/Sodium


 Chloride  100 ml @ 0


 mls/hr  PROTOCOL


 IV


   12/14/24 22:00


 1/13/25 21:59   





 


 Calcium Gluconate


 1 gm/Sodium


 Chloride  100 ml @ 0


 mls/hr  PROTOCOL


 IV


   12/15/24 05:00


 12/15/24 04:51 DC  





 


 Carvedilol


  (Coreg 6.25MG)  6.25 mg  BIDMEALS


 PO


   12/14/24 22:00


 1/13/25 21:59  12/16/24 08:16


6.25 MG


 


 Ceftriaxone Sodium


  (ROCEphine 1G


 INJ)  1 gm  ONCE  STAT


 IVPB


   12/14/24 20:16


 12/14/24 20:18 DC 12/14/24 20:42


1 GM


 


 Dextrose


  (D50w)  50 ml  AD  PRN


 IV


 HYPOGLYCEMIA PROTOCOL  12/14/24 21:30


 1/13/25 21:29   





 


 Doxycycline


 Hyclate  250 ml @ 


 125 mls/hr  Q12H


 IV


   12/15/24 12:00


 12/15/24 10:45 DC  





 


 Famotidine


  (Pepcid 20mg


 Vial)  20 mg  DAILY


 IV


   12/15/24 09:00


 1/14/25 08:59  12/16/24 08:16


20 MG


 


 Furosemide


  (LASix 20MG VIAL)  20 mg  Q8H


 IV


   12/14/24 21:30


 1/13/25 21:29  12/16/24 05:44


20 MG


 


 Gabapentin


  (NEURontin 100


 mg CAP)  100 mg  TID


 PO


   12/16/24 10:00


 1/15/25 09:59  12/16/24 10:11


100 MG


 


 Glucagon


  (Glucagon 1mg


 Kit)  1 mg  AD  PRN


 IM


 HYPOGLYCEMIA PROTOCOL  12/14/24 21:30


 1/13/25 21:29   





 


 Heparin Sodium


  (Porcine)


  (HEParin 5,000


 UNIT VIAL)  5,000 unit  BID


 SQ


   12/15/24 09:00


 1/14/25 08:59  12/16/24 08:18


5,000 UNIT


 


 Heparin Sodium


  (Porcine)


  (HEParin 5,000


 UNIT VIAL)  5,000 unit  BID


 SQ


   12/15/24 09:00


 12/14/24 23:12 DC  





 


 Hydralazine HCl


  (APRESOLine 20MG


 INJ)  10 mg  Q6H  PRN


 IV


 For:SBP above 160;DBP above 90  12/14/24 23:00


 1/13/25 22:59  12/16/24 10:12


10 MG


 


 Hydromorphone HCl


  (DiLAUDid 1MG


 INJ)  1 mg  Q4H  PRN


 IVP


 SEVERE PAIN (7-10)  12/16/24 10:00


 12/21/24 09:59  12/16/24 10:43


1 MG


 


 Insulin Human


 Regular


  (humuLIN R 100


 UNIT/ML 3ML)  INSULIN


 SLIDING


 SCAL...  ACHS


 SQ


   12/16/24 11:30


 1/15/25 11:29   





 


 Insulin Human


 Regular


  (humuLIN R 100


 UNIT/ML 3ML)  INSULIN


 SLIDING


 SCAL...  Q6H6


 SQ


   12/15/24 00:00


 12/16/24 10:40 DC 12/15/24 18:22


3 UNIT


 


 Levofloxacin/


 Dextrose  50 ml @ 50


 mls/hr  Q48H


 IVPB


   12/15/24 11:00


 12/25/24 10:59  12/15/24 12:40


50 MLS/HR


 


 Levofloxacin/


 Dextrose  100 ml @ 


 100 mls/hr  Q24H


 IV


   12/15/24 11:00


 12/15/24 10:53 DC  





 


 Lorazepam


  (AtiVAN)  1 mg  ONCE  STAT


 IVP


   12/14/24 17:39


 12/14/24 17:40 DC 12/14/24 17:46


1 MG


 


 Meropenem


  (Merrem)  500 mg  Q12H


 IVPB


   12/15/24 11:00


 12/25/24 10:59  12/16/24 10:11


500 MG


 


 Meropenem 500 mg/


 Sodium Chloride  100 ml @ 


 33.333 mls/


 hr  Q12H


 IV


   12/15/24 10:30


 12/15/24 10:48 DC  





 


 Methylprednisolone


 Sodium Succinate


  (Solu-medROL


 40MG)  40 mg  BID


 IVP


   12/15/24 09:00


 12/15/24 10:45 DC 12/15/24 09:36


40 MG


 


 Ondansetron HCl


  (zoFRAN 4MG INJ)  4 mg  Q6H  PRN


 IV


 NAUSEA/VOMITING  12/14/24 23:00


 1/13/25 22:59  12/16/24 11:55


4 MG


 


 Piperacillin Sod/


 Tazobactam Sod


  (Zosyn


 3.375gm+NS 50ml)  3.375 gm  Q12H


 IVPB


   12/14/24 21:00


 12/15/24 10:45 DC 12/15/24 09:36


3.375 GM


 


 Sodium Chloride  1,000 ml @ 


 500 mls/hr  Q2H STAT


 IV


   12/14/24 20:04


 12/14/24 22:03 DC 12/14/24 20:40


500 MLS/HR


 


 Sodium Chloride


  (NS 50ml)  50 ml  AD


 IV


   12/15/24 00:00


 12/14/24 21:15 DC  





 


 Vancomycin HCl  250 ml @ 


 125 mls/hr  Q48H


 IV


   12/17/24 11:30


 12/27/24 11:29   





 


 Vancomycin HCl


  (Vancomycin


 Protocol)  1 each  AD


 IV


   12/15/24 10:30


 12/29/24 10:29   














LABORATORY:


[ ]








                                Hematology Labs:








Test


 12/16/24


04:22 12/15/24


04:09 Range/Units


 


 


White Blood Count 18.5 H  4.8-10.8  K/uL


 


Red Blood Count


 3.30 L


 


 4.00-5.50


MIL/uL


 


Hemoglobin 10.2 L  12.0-16.0  g/dL


 


Hematocrit 32.0 L  36-48  %


 


Mean Corpuscular Volume 97.0   79-99  fL


 


Mean Corpuscular Hemoglobin 30.9   27.0-33.0  pg


 


Mean Corpuscular Hemoglobin


Concent 31.9 L


 


 32.0-36.0  g/dL





 


Red Cell Distribution Width 15.2   11.0-15.5  %


 


Platelet Count 259   130-400  K/uL


 


Mean Platelet Volume 11.7 H  7.5-10.5  fL


 


Segmented Neutrophils % 88 H  40-70  %


 


Band Neutrophils % 1   0-2  %


 


Lymphocytes % (Manual) 6 L  22-44  %


 


Monocytes % (Manual) 5   2-9  %


 


Nucleated Red Blood Cells 0.0   0.0-0.19  %


 


Differential Comment


 MANUAL


DIFFERENTIAL 


  





 


White Cell Morphology Comment See comments    


 


Platelet Morphology Comment ADEQUATE    


 


Red Blood Cell Morphology See comments    


 


Red Cell Morphology Comment     


 


Immature Granulocyte % (Auto)  0.5  0-1  %


 


Neutrophils (%) (Auto)  92.9 H 40.0-77.0  %


 


Lymphocytes (%) (Auto)  5.1 L 21.0-51.0  %


 


Monocytes (%) (Auto)  1.4 L 3.0-13.0  %


 


Eosinophils (%) (Auto)  0.0  0.0-8.0  %


 


Basophils (%) (Auto)  0.1  0.0-5.0  %


 


Neutrophils # (Auto)  13.5 H 1.8-7.7  K/uL


 


Lymphocytes # (Auto)  0.7 L 1.0-4.8  K/uL


 


Monocytes # (Auto)  0.2  0.1-1.0  K/uL


 


Eosinophils # (Auto)  0.00  0.00-0.70  K/uL


 


Basophils # (Auto)  0.01  0.00-0.20  K/uL


 


Absolute Immature Granulocyte


(auto 


 0.07 


 0-1  K/uL











                                 Chemistry Labs:








Test


 12/16/24


11:12 12/16/24


04:22 12/15/24


04:09 12/14/24


21:33 Range/Units


 


 


Whole Blood Glucose 170 H      MG/DL


 


Sodium Level  147 H   136-145  mmol/L


 


Potassium Level  3.4 L   3.5-5.1  mmol/L


 


Chloride Level  113 H   101-111  mmol/L


 


Carbon Dioxide Level  23    21-32  mmol/L


 


Blood Urea Nitrogen  68 H   7-18  mg/dL


 


Creatinine  3.4 H   0.5-1.0  mg/dL


 


Glomerular Filtration Rate


Calc 


 14 


 


 


 >90  mL/min





 


Random Glucose  117 H     mg/dL


 


Total Calcium  7.7 L   8.5-10.1  mg/dL


 


Magnesium Level


 


 1.80 


 


 


 1.80-2.40


mg/dL


 


Procalcitonin  < 0.05 L   0.05-0.5  ng/mL


 


Hemoglobin A1c   9.7 H  4.0-6.0  %


 


Estimated Average Glucose


(eAG) 


 


 232 H


 


   mg/dL





 


Phosphorus Level   7.3 H  2.5-4.9  mg/dL


 


Lactic Acid Level    1.6  0.8-2.5  mmol/L


 


Thyroid Stimulating Hormone


(TSH) 


 


 


 3.15 


 0.36-3.74


uIU/mL


 


Test


 12/14/24


17:36 


 


 


 Range/Units


 


 


Ammonia 15     11-32  umol/L


 


Troponin I High Sensitivity 9     4-50  ng/L


 


B-Type Natriuretic Peptide 611 H    0-100  pg/mL











DIAGNOSTICS / RADIOLOGY:


REASON: sepsis, backpain, ruling out device infection


ORDERING PHYSICIAN: TANNER PERRY CNP


PROCEDURE: T SPINE WO  -  CT THORACIC SPINE W/O CONTRAST





CT THORACIC SPINE W/O CONTRAST, CT LUMBAR SPINE W/O CONTRAST





HISTORY: sepsis, back pain, ruling out device infection





TECHNIQUE: CT THORACIC SPINE W/O CONTRAST, CT LUMBAR SPINE W/O CONTRAST.


Coronal and sagittal reformats were obtained. CT was performed with one


or more of the following dose reduction techniques: Automated exposure


control, adjustment of the mA and/or kV according to the patient's size,


or use of the iterative reconstruction technique.








FINDINGS: 


Evaluation of the cord and discs is limited with CT. 


No evidence of compression fracture or dislocation.  


There is diffuse osteopenia degraded evaluation of the study.


Multilevel degenerative changes are noted. Postop changes of posterior


fusion are seen at L1-L2 on the right with pedicle screws and fixation


bar. Advanced degenerative changes are seen at L2-L3 with disc space


loss, endplate osteophytes and endplate sclerosis/lucencies, underlying


infection is not excluded. Correlate clinically. Stimulator leads are


seen in the lower thoracic spine.


Small left and moderate right pleural effusion with bilateral lower lobe


consolidation.


The aorta is normal in caliber. No acute findings in the visualized


abdomen.  The visualized lungs are clear. 








IMPRESSION: 


There is diffuse osteopenia degraded evaluation of the study.


Multilevel degenerative changes are noted. Postop changes of posterior


fusion are seen at L1-L2 on the right with pedicle screws and fixation


bar. Advanced degenerative changes are seen at L2-L3 with disc space


loss, endplate osteophytes and endplate sclerosis/lucencies, underlying


infection is not excluded. Correlate clinically. Stimulator leads are


seen in the lower thoracic spine.


Small left and moderate right pleural effusion with bilateral lower lobe


consolidation..




















DICTATED BY: TUCKER RAYO MD


DATE: 12/15/24 1245





REASON: sepsis, backpain, ruling out device infection


ORDERING PHYSICIAN: TANNER PERRY CNP


PROCEDURE: L SPIN WO  -  CT LUMBAR SPINE W/O CONTRAST





CT THORACIC SPINE W/O CONTRAST, CT LUMBAR SPINE W/O CONTRAST





HISTORY: sepsis, back pain, ruling out device infection





TECHNIQUE: CT THORACIC SPINE W/O CONTRAST, CT LUMBAR SPINE W/O CONTRAST.


Coronal and sagittal reformats were obtained. CT was performed with one


or more of the following dose reduction techniques: Automated exposure


control, adjustment of the mA and/or kV according to the patient's size,


or use of the iterative reconstruction technique.








FINDINGS: 


Evaluation of the cord and discs is limited with CT. 


No evidence of compression fracture or dislocation.  


There is diffuse osteopenia degraded evaluation of the study.


Multilevel degenerative changes are noted. Postop changes of posterior


fusion are seen at L1-L2 on the right with pedicle screws and fixation


bar. Advanced degenerative changes are seen at L2-L3 with disc space


loss, endplate osteophytes and endplate sclerosis/lucencies, underlying


infection is not excluded. Correlate clinically. Stimulator leads are


seen in the lower thoracic spine.


Small left and moderate right pleural effusion with bilateral lower lobe


consolidation.


The aorta is normal in caliber. No acute findings in the visualized


abdomen.  The visualized lungs are clear. 








IMPRESSION: 


There is diffuse osteopenia degraded evaluation of the study.


Multilevel degenerative changes are noted. Postop changes of posterior


fusion are seen at L1-L2 on the right with pedicle screws and fixation


bar. Advanced degenerative changes are seen at L2-L3 with disc space


loss, endplate osteophytes and endplate sclerosis/lucencies, underlying


infection is not excluded. Correlate clinically. Stimulator leads are


seen in the lower thoracic spine.


Small left and moderate right pleural effusion with bilateral lower lobe


consolidation..




















DICTATED BY: TUCKER RAYO MD


DATE: 12/15/24 1245





REASON: AMS


ORDERING PHYSICIAN: BRAYAN GENAO NP


PROCEDURE: HEAD WO  -  CT HEAD/BRAIN W/O CONTRAST





CT HEAD/BRAIN W/O CONTRAST





INDICATION:   AMS





TECHNIQUE: CT HEAD/BRAIN W/O CONTRAST. CT was performed with one or more


of the following dose reduction techniques: Automated exposure control,


adjustment of the mA and/or kV according to the patient's size, or use


of the iterative reconstruction technique.


Comparison: None





FINDINGS:


Cerebral atrophy seen. Nonspecific periventricular and subcortical white


matters changes are noted likely representing small vessel ischemic


changes. No midline shift or herniation. No extra axial collection. No


acute intracranial bleed.


The visualized paranasal sinuses and mastoid air cells are normally


aerated.











IMPRESSION: 


Mild atrophy. No acute intracranial bleed is seen.


Scattered nonspecific white matter changes 























DICTATED BY: TUCKER RAYO MD


DATE: 12/14/24 1736





REASON: SOB


ORDERING PHYSICIAN: BRAYAN GENAO NP


PROCEDURE: CXR1VW  -  CHEST 1VW














INDICATION: SOB





TECHNIQUE: CHEST 1VW





COMPARISON: 12/12/2024








FINDINGS/IMPRESSION:


Bilateral airspace consolidation suggesting vascular congestion/edema


versus pneumonia. Small right effusion is seen.


Cardiomegaly is seen 


Mild degenerative changes of the spine.


The visualized upper abdomen appears unremarkable.


























DICTATED BY: TUCKER RAYO MD


DATE: 12/14/24 1819





ASSESSMENT: 


Hypokalemia


Acute on chronic renal failure 


Nephrotic syndrome


Metabolic encephalopathy


Sepsis without shock


Pneumonia


Diabetes mellitus type 2


Hypertension


Obesity BMI 31.7


PAD both lower extremities


Right pleural effusion with mild interstitial fibrosis


Possible infectious process involving L1-L2.





PLAN: 


Labs, diagnostic, radiologic exams reviewed and interpreted by myself and 

supervising physician. 


We have reviewed external records in detail


Discontinue Vancomycin due to worsening renal function, use alternative 

antibiotic.


Require close monitoring of renal function and electrolytes 


Order CBC, CMP, uric acid, complete iron panel, ferritin and electrolytes in am 


Follow up blood cultures


BiPAP as necessary, for respiratory distress


IV pressors as needed


Monitor blood pressure adjust medication doses as needed


Avoid hypotensive episodes


May use Dilaudid 0.5 mg IV every 6 hours as needed for severe pain


Monitor blood sugars 


Strict intake, output, and daily weight should be monitored


Please renally adjust medications


Avoid nephrotoxic and nonsteroidal drugs


Avoid contrast if possible


Will continue to monitor renal function, anemia, electrolytes


Treatment plan discussed with patient


Questions were answered


We have discussed with the other team physicians in detail about the care plan


We will continue to monitor the patient closely


Total critical care time spent with patient, nursing staff, critical care team 

over 35 minutes


ATTESTATION BY PHYSICIAN


I have seen and examined the patient. I reviewed the documentation, medical 

decision making, and treatment plan as noted by the mid-level provider above. I 

agree with the findings and plan of care.











ELISEO FARRELL MD, [facility]             Dec 16, 2024 13:44

## 2024-12-16 NOTE — [facility]
MR SPINAL CANAL, LUMBAR WO CON



HISTORY: Pain



COMPARISON: CT from 12/15/2024



TECHNIQUE: MRI of the lumbar spine was performed utilizing multiple

pulse sequences in axial and sagittal plane. Patient was not given

contrast through intravenous route. 



FINDINGS: Endplate degenerative changes with disc space narrowing are

seen predominantly involving the L1-L2 level. Orthopedic fixation plates

and screws are seen traversing the T12 and L1 with paramagnetic

susceptibility artifacts limiting evaluation. There is disc noted at the

lumbosacral junction and for numbering purposes only, this level will be

designated as S1-S2. No abnormal signal intensity is seen of the

visualized bony structure.  No loss of vertebral height is seen.  There

is straightening of normal lumbar curvature which may be related to

muscle spasm or positioning. Degenerative disc signals are present at

all lumbar spine levels. Visualized distal conus is unremarkable.



At the L2-3 level,  there is spondylotic with central disc

protrusion/herniation with bilateral ligamentum flavum hypertrophy

causing anterior thecal sac compression with bilateral lateral recess

stenosis and bilateral neural foraminal stenosis. The thecal sac

measures approximately 7 mm in its anterior posterior dimension.



At the L3-4 level,  there is spondylotic disc with bilateral ligamentum

flavum hypertrophy causing anterior thecal sac compression with

bilateral lateral recess stenosis and bilateral neural foraminal

stenosis. The thecal sac measures approximately 6.2 mm in its anterior

posterior dimension.



At the L4-5 level,  there is spondylotic disc with bilateral ligamentum

flavum hypertrophy causing anterior thecal sac compression with

bilateral lateral recess stenosis and bilateral neural foraminal

stenosis. The thecal sac measures approximately 8.4 mm in its anterior

posterior dimension.



At the L5-S1 level,  there is spondylotic disc with bilateral ligamentum

flavum hypertrophy and facet hypertrophy causing anterior thecal sac

compression with bilateral lateral recess stenosis and bilateral neural

foraminal stenosis. The thecal sac measures approximately 8.2 mm in its

anterior posterior dimension.



IMPRESSION:

1. DJD with lumbar spine spondylosis as described above. Evaluation at

T12 and L1 are limited due to postop artifacts.

## 2024-12-16 NOTE — PN
[facility] PROGRESS NOTE








Date of Service: Dec 16, 2024 


Time of Service: 10:52





SUBJECTIVE:





12/15 Pt seen at bedside, no acute events overnight.  She is currently not 

needing pressors or intubation however she is somnolent with rigors suggesting 

severe infection.  Will continue with current care, broad spectrum antibiotics 

and ICU care.  Appreciate nephrology recommendations.  





12/16 the patient has been seen and examined earlier this morning during my 

rounding, no acute events overnight, she is alert oriented x3, hemodynamically 

stable, saturating normal on air.  Results of MRI thoracic and lumbar spine 

reviewed, discussed with the patient, questions answered.  WBC today trending 

up.





REVIEW OF SYSTEMS


12 point ROS negative unless noted in HPI





PHYSICAL EXAM


GENERAL APPEARANCE:  The patient is awake, alert, and oriented, in no acute 

cardiopulmonary distress.


NEUROLOGICAL:  Cranial nerves II-XII grossly intact.  Motor is 5/5 in bilateral 

upper and lower extremities proximal to distal.  No sensory deficits.


HEENT:  Face is symmetric. Pupils are equal and reactive.  Extraocular movements

are intact.


NECK:  Supple.  No JVD. No thyromegaly. No submental, submandibular, pre-

/postauricular, occipital or supraclavicular lymphadenopathy.


CHEST:  Normal chest expansion. No Telemetry.


LUNGS:  Absence of any rales, rhonchi or any wheezing.


CARDIOVASCULAR:  Regular.  S1 and S2 normal.  No appreciable rubs, murmurs or 

gallops. 


ABDOMEN:  Soft, nontender, and nondistended.  There is no rebound, voluntary 

guarding, or rigidity.


:  Deferred. No Matos.


EXTREMITIES:  Non-edematous and not cyanotic.  No clubbing.  Good capillary 

refill.


SKIN:  No skin breakdown.





                             Vital Signs (last 8hr)








  Date Time  Temp Pulse Resp B/P (MAP) Pulse Ox O2 Delivery O2 Flow Rate FiO2


 


12/16/24 09:00  93 15 163/79 94 Room Air  


 


12/16/24 08:16    164/68    


 


12/16/24 08:00     95 Room Air* 0 21


 


12/16/24 08:00  97 17 161/94 93 Room Air  


 


12/16/24 07:00 97.9 93 15 164/68 90 Nasal Cannula 2.0 


 


12/16/24 06:38  92 16     


 


12/16/24 06:38  92 16   N/Cannula Low lpm 2.0 28


 


12/16/24 05:00  89 25 155/82 97   


 


12/16/24 04:00     97 Nasal Cannula* 2 28


 


12/16/24 04:00 99.5 93 24 145/85 98 Nasal Cannula 2.0 


 


12/16/24 03:00  93 26 156/79 98   











LABS:








                                   Laboratory:








Test


 12/16/24


06:43 12/16/24


04:22 12/15/24


04:09 12/15/24


02:45 Range/Units


 


 


Whole Blood Glucose 120 H      MG/DL


 


White Blood Count  18.5 H   4.8-10.8  K/uL


 


Red Blood Count


 


 3.30 L


 


 


 4.00-5.50


MIL/uL


 


Hemoglobin  10.2 L   12.0-16.0  g/dL


 


Hematocrit  32.0 L   36-48  %


 


Mean Corpuscular Volume  97.0    79-99  fL


 


Mean Corpuscular Hemoglobin  30.9    27.0-33.0  pg


 


Mean Corpuscular Hemoglobin


Concent 


 31.9 L


 


 


 32.0-36.0  g/dL





 


Red Cell Distribution Width  15.2    11.0-15.5  %


 


Platelet Count  259    130-400  K/uL


 


Mean Platelet Volume  11.7 H   7.5-10.5  fL


 


Segmented Neutrophils %  88 H   40-70  %


 


Band Neutrophils %  1    0-2  %


 


Lymphocytes % (Manual)  6 L   22-44  %


 


Monocytes % (Manual)  5    2-9  %


 


Nucleated Red Blood Cells  0.0    0.0-0.19  %


 


Differential Comment


 


 MANUAL


DIFFERENTIAL 


 


  





 


White Cell Morphology Comment  See comments     


 


Platelet Morphology Comment  ADEQUATE     


 


Red Blood Cell Morphology  See comments     


 


Red Cell Morphology Comment       


 


Sodium Level  147 H   136-145  mmol/L


 


Potassium Level  3.4 L   3.5-5.1  mmol/L


 


Chloride Level  113 H   101-111  mmol/L


 


Carbon Dioxide Level  23    21-32  mmol/L


 


Blood Urea Nitrogen  68 H   7-18  mg/dL


 


Creatinine  3.4 H   0.5-1.0  mg/dL


 


Glomerular Filtration Rate


Calc 


 14 


 


 


 >90  mL/min





 


Random Glucose  117 H     mg/dL


 


Total Calcium  7.7 L   8.5-10.1  mg/dL


 


Magnesium Level


 


 1.80 


 


 


 1.80-2.40


mg/dL


 


Procalcitonin  < 0.05 L   0.05-0.5  ng/mL


 


Immature Granulocyte % (Auto)   0.5   0-1  %


 


Neutrophils (%) (Auto)   92.9 H  40.0-77.0  %


 


Lymphocytes (%) (Auto)   5.1 L  21.0-51.0  %


 


Monocytes (%) (Auto)   1.4 L  3.0-13.0  %


 


Eosinophils (%) (Auto)   0.0   0.0-8.0  %


 


Basophils (%) (Auto)   0.1   0.0-5.0  %


 


Neutrophils # (Auto)   13.5 H  1.8-7.7  K/uL


 


Lymphocytes # (Auto)   0.7 L  1.0-4.8  K/uL


 


Monocytes # (Auto)   0.2   0.1-1.0  K/uL


 


Eosinophils # (Auto)   0.00   0.00-0.70  K/uL


 


Basophils # (Auto)   0.01   0.00-0.20  K/uL


 


Absolute Immature Granulocyte


(auto 


 


 0.07 


 


 0-1  K/uL





 


Hemoglobin A1c   9.7 H  4.0-6.0  %


 


Estimated Average Glucose


(eAG) 


 


 232 H


 


   mg/dL





 


Phosphorus Level   7.3 H  2.5-4.9  mg/dL


 


Urine Random Creatinine    25.77 L   mg/dL


 


Urine Random Sodium    67    mmol/l


 


Test


 12/14/24


21:33 12/14/24


21:16 12/14/24


19:00 12/14/24


17:55 Range/Units


 


 


Lactic Acid Level 1.6     0.8-2.5  mmol/L


 


Thyroid Stimulating Hormone


(TSH) 3.15 


 


 


 


 0.36-3.74


uIU/mL


 


Acetaminophen Level 6 L    10-30  mcg/mL


 


Blood Gas Specimen Type  Arterial     


 


Arterial Blood pH  7.341 L   7.350-7.450  


 


Arterial Blood Partial


Pressure CO2 


 32 


 


 


 32-45  mmHg





 


Arterial Blood Partial


Pressure O2 


 92.5 


 


 


 83.0-108.0


mmHg


 


Arterial Blood HCO3


 


 16.9 L


 


 


 21.0-28.0


mmol/L


 


Arterial Blood Oxygen


Saturation 


 96.8 


 


 


 94.0-98.0  %





 


Arterial Blood Base Excess


 


 -7.6 L


 


 


 -2.0-3.0


mmol/L


 


Blood Gas Temperature


 


 37.0 


 


 


 35.5-37.0


CELSIUS


 


FiO2  28.0     %


 


Blood Gas Specimen Comment  RR RN     


 


Blood Gas Flow-by


 


 


 2.00 


 


 0.00-15.00


L/min


 


Blood Gas Vent Mode   NC   ROOM AIR  


 


Urine Color    LIGHT-YELLOW  YELLOW  


 


Urine Appearance    CLOUDY H CLEAR  


 


Urine pH    6.0  5.0-8.0  


 


Urine Specific Gravity    1.014  1.001-1.031  


 


Urine Protein    600 H NEGATIVE  mg/dL


 


Urine Glucose (UA)    500 H NEGATIVE  mg/dL


 


Urine Ketones    NEGATIVE  NEGATIVE  mg/dL


 


Urine Occult Blood    NEGATIVE  NEGATIVE  


 


Urine Nitrate    NEGATIVE  NEGATIVE  


 


Urine Bilirubin    NEGATIVE  NEGATIVE  mg/dL


 


Urine Urobilinogen    0.2  0.2-1.0  mg/dL


 


Urine Leukocyte Esterase


 


 


 


 NEGATIVE 


 NEGATIVE


Sanaz/uL


 


Urine RBC    0-1  0-1  /HPF


 


Urine WBC    2-5 H 0-1  /HPF


 


Urine Squamous Epithelial


Cells 


 


 


 RARE 


 0-2  /HPF





 


Urine Bacteria    RARE  None Seen  /HPF


 


Urine Osmolality


 


 


 


 328 


 


mOsm/kg


 


Urine Opiates Screen    NEGATIVE  NEGATIVE  


 


Urine Barbiturates Screen    NEGATIVE  NEGATIVE  


 


Urine Phencyclidine Screen    NEGATIVE  NEGATIVE  


 


Urine Amphetamines Screen    NEGATIVE  NEGATIVE  


 


Urine Benzodiazepines Screen    NEGATIVE  NEGATIVE  


 


Urine Cocaine Screen    NEGATIVE  NEGATIVE  


 


Urine Marijuana (THC) Screen    NEGATIVE  NEGATIVE  


 


Test


 12/14/24


17:36 


 


 


 Range/Units


 


 


Ammonia 15     11-32  umol/L


 


Troponin I High Sensitivity 9     4-50  ng/L


 


B-Type Natriuretic Peptide 611 H    0-100  pg/mL


 


Serum Alcohol < 3     0-10  mg/dL











                               Current Medications








 Medications


  (Trade)  Dose


 Ordered  Sig/Chaim


 Route


 PRN Reason  Start Time


 Stop Time Status Last Admin


Dose Admin


 


 Acetaminophen


  (TYLenol 325MG


 TAB)  650 mg  Q6H  PRN


 PO


 TEMPERATURE GREATER THAN 101.5  12/14/24 23:00


 12/15/24 17:42 DC 12/15/24 17:35


650 MG


 


 Acetaminophen


  (TYLenol 325MG


 TAB)  650 mg  Q6H  PRN


 PO


 MILD PAIN (1-3)  12/15/24 17:30


 1/14/25 17:29  12/16/24 08:15


650 MG


 


 Albuterol


  (DUOneb)  1 UDVIAL  [id number]


 IH


   12/15/24 00:00


 1/14/25 00:00  12/16/24 06:34


1 UDVIAL


 


 Albuterol Sulfate


  (Proventil


 0.083% 2.5mg/3ml)  2.5MG  Q4H  PRN


 IH


 SHORTNESS OF BREATH  12/14/24 23:00


 1/13/25 22:59   





 


 Azithromycin  250 ml @ 


 250 mls/hr  Q24H  STAT


 IVPB


   12/14/24 20:16


 12/14/24 21:15 DC 12/14/24 20:42


250 MLS/HR


 


 Calcium Gluconate


 1 gm/Sodium


 Chloride  100 ml @ 0


 mls/hr  PROTOCOL


 IV


   12/14/24 22:00


 1/13/25 21:59   





 


 Calcium Gluconate


 1 gm/Sodium


 Chloride  100 ml @ 0


 mls/hr  PROTOCOL


 IV


   12/15/24 05:00


 12/15/24 04:51 DC  





 


 Carvedilol


  (Coreg 6.25MG)  6.25 mg  BIDMEALS


 PO


   12/14/24 22:00


 1/13/25 21:59  12/16/24 08:16


6.25 MG


 


 Ceftriaxone Sodium


  (ROCEphine 1G


 INJ)  1 gm  ONCE  STAT


 IVPB


   12/14/24 20:16


 12/14/24 20:18 DC 12/14/24 20:42


1 GM


 


 Dextrose


  (D50w)  50 ml  AD  PRN


 IV


 HYPOGLYCEMIA PROTOCOL  12/14/24 21:30


 1/13/25 21:29   





 


 Doxycycline


 Hyclate  250 ml @ 


 125 mls/hr  Q12H


 IV


   12/15/24 12:00


 12/15/24 10:45 DC  





 


 Famotidine


  (Pepcid 20mg


 Vial)  20 mg  DAILY


 IV


   12/15/24 09:00


 1/14/25 08:59  12/16/24 08:16


20 MG


 


 Furosemide


  (LASix 20MG VIAL)  20 mg  Q8H


 IV


   12/14/24 21:30


 1/13/25 21:29  12/16/24 05:44


20 MG


 


 Gabapentin


  (NEURontin 100


 mg CAP)  100 mg  TID


 PO


   12/16/24 10:00


 1/15/25 09:59  12/16/24 10:11


100 MG


 


 Glucagon


  (Glucagon 1mg


 Kit)  1 mg  AD  PRN


 IM


 HYPOGLYCEMIA PROTOCOL  12/14/24 21:30


 1/13/25 21:29   





 


 Heparin Sodium


  (Porcine)


  (HEParin 5,000


 UNIT VIAL)  5,000 unit  BID


 SQ


   12/15/24 09:00


 1/14/25 08:59  12/16/24 08:18


5,000 UNIT


 


 Heparin Sodium


  (Porcine)


  (HEParin 5,000


 UNIT VIAL)  5,000 unit  BID


 SQ


   12/15/24 09:00


 12/14/24 23:12 DC  





 


 Hydralazine HCl


  (APRESOLine 20MG


 INJ)  10 mg  Q6H  PRN


 IV


 For:SBP above 160;DBP above 90  12/14/24 23:00


 1/13/25 22:59  12/16/24 10:12


10 MG


 


 Hydromorphone HCl


  (DiLAUDid 1MG


 INJ)  1 mg  Q4H  PRN


 IVP


 SEVERE PAIN (7-10)  12/16/24 10:00


 12/21/24 09:59  12/16/24 10:43


1 MG


 


 Insulin Human


 Regular


  (humuLIN R 100


 UNIT/ML 3ML)  INSULIN


 SLIDING


 SCAL...  ACHS


 SQ


   12/16/24 11:30


 1/15/25 11:29   





 


 Insulin Human


 Regular


  (humuLIN R 100


 UNIT/ML 3ML)  INSULIN


 SLIDING


 SCAL...  Q6H6


 SQ


   12/15/24 00:00


 12/16/24 10:40 DC 12/15/24 18:22


3 UNIT


 


 Levofloxacin/


 Dextrose  50 ml @ 50


 mls/hr  Q48H


 IVPB


   12/15/24 11:00


 12/25/24 10:59  12/15/24 12:40


50 MLS/HR


 


 Levofloxacin/


 Dextrose  100 ml @ 


 100 mls/hr  Q24H


 IV


   12/15/24 11:00


 12/15/24 10:53 DC  





 


 Lorazepam


  (AtiVAN)  1 mg  ONCE  STAT


 IVP


   12/14/24 17:39


 12/14/24 17:40 DC 12/14/24 17:46


1 MG


 


 Meropenem


  (Merrem)  500 mg  Q12H


 IVPB


   12/15/24 11:00


 12/25/24 10:59  12/16/24 10:11


500 MG


 


 Meropenem 500 mg/


 Sodium Chloride  100 ml @ 


 33.333 mls/


 hr  Q12H


 IV


   12/15/24 10:30


 12/15/24 10:48 DC  





 


 Methylprednisolone


 Sodium Succinate


  (Solu-medROL


 40MG)  40 mg  BID


 IVP


   12/15/24 09:00


 12/15/24 10:45 DC 12/15/24 09:36


40 MG


 


 Ondansetron HCl


  (zoFRAN 4MG INJ)  4 mg  Q6H  PRN


 IV


 NAUSEA/VOMITING  12/14/24 23:00


 1/13/25 22:59  12/15/24 16:29


4 MG


 


 Piperacillin Sod/


 Tazobactam Sod


  (Zosyn


 3.375gm+NS 50ml)  3.375 gm  Q12H


 IVPB


   12/14/24 21:00


 12/15/24 10:45 DC 12/15/24 09:36


3.375 GM


 


 Sodium Chloride  1,000 ml @ 


 500 mls/hr  Q2H STAT


 IV


   12/14/24 20:04


 12/14/24 22:03 DC 12/14/24 20:40


500 MLS/HR


 


 Sodium Chloride


  (NS 50ml)  50 ml  AD


 IV


   12/15/24 00:00


 12/14/24 21:15 DC  





 


 Vancomycin HCl  250 ml @ 


 125 mls/hr  Q48H


 IV


   12/17/24 11:30


 12/27/24 11:29   





 


 Vancomycin HCl


  (Vancomycin


 Protocol)  1 each  AD


 IV


   12/15/24 10:30


 12/29/24 10:29   














DIAGNOSTICS / RADIOLOGY:


[ ]





CT THORACIC SPINE W/O CONTRAST, CT LUMBAR SPINE W/O CONTRAST





HISTORY: sepsis, back pain, ruling out device infection





TECHNIQUE: CT THORACIC SPINE W/O CONTRAST, CT LUMBAR SPINE W/O CONTRAST.


Coronal and sagittal reformats were obtained. CT was performed with one


or more of the following dose reduction techniques: Automated exposure


control, adjustment of the mA and/or kV according to the patient's size,


or use of the iterative reconstruction technique.








FINDINGS: 


Evaluation of the cord and discs is limited with CT. 


No evidence of compression fracture or dislocation.  


There is diffuse osteopenia degraded evaluation of the study.


Multilevel degenerative changes are noted. Postop changes of posterior


fusion are seen at L1-L2 on the right with pedicle screws and fixation


bar. Advanced degenerative changes are seen at L2-L3 with disc space


loss, endplate osteophytes and endplate sclerosis/lucencies, underlying


infection is not excluded. Correlate clinically. Stimulator leads are


seen in the lower thoracic spine.


Small left and moderate right pleural effusion with bilateral lower lobe


consolidation.


The aorta is normal in caliber. No acute findings in the visualized


abdomen.  The visualized lungs are clear. 








IMPRESSION: 


There is diffuse osteopenia degraded evaluation of the study.


Multilevel degenerative changes are noted. Postop changes of posterior


fusion are seen at L1-L2 on the right with pedicle screws and fixation


bar. Advanced degenerative changes are seen at L2-L3 with disc space


loss, endplate osteophytes and endplate sclerosis/lucencies, underlying


infection is not excluded. Correlate clinically. Stimulator leads are


seen in the lower thoracic spine.


Small left and moderate right pleural effusion with bilateral lower lobe


consolidation..





ASSESSMENT:





Metabolic encephalopathy


Sepsis without shock


Pneumonia


Chronic renal failure. 


Hypokalemia, POA


Diabetes mellitus type 2 last A1C 9.7


Hypertension


Obesity BMI 31.7


PAD both lower extremities


Right pleural effusion with mild interstitial fibrosis


Possible infectious process involving L1-L2.





PLAN:





- patient to be downgraded to medical floor


- Continue broad spectrum antibiotics, Vancomycin, meropenem, levofloxacin


- Continue sliding scale insulin


- Continue hypoglycemia protocol


- Continue furosemide 20mg TID


- Critical care consulted, appreciate recommendations





Disposition:  Results of CT thoracic and lumbar spine reviewed, possible 

infectious process at L1-L2, we will continue broad-spectrum antibiotics, 

infectious disease consultation requested, we will follow input and re

commendations.  We will discuss also with the neurosurgery.





Plan of action discussed with the patient, all questions answered, agreed and 

understood the information provided.





Total time spent greater than 30 minutes.











BENJAMÍN MEJIA MD           Dec 16, 2024 10:54

## 2024-12-16 NOTE — CONS
pt out during my visit will f/u tomorrow


Patient History:  


Carcinomas


  FATHER, , Age: 85 (lungs )


Cardiovascular disease


  MOTHER, , Age: 92


Diabetes mellitus


  MOTHER, , Age: 92


  FATHER, , Age: 85


  BROTHER


  SISTER


Hypertension


  MOTHER, , Age: 92


  SISTER





No Family History of:


  Alzheimer's disease


  Asthma


  Chronic obstructive pulmonary disease


  Completed stroke


  Immunocompromised state


  Sudden death


  Unknown


Vitals/Labs





Vital Signs








  Date Time  Temp Pulse Resp B/P (MAP) Pulse Ox O2 Delivery O2 Flow Rate FiO2


 


24 19:24  83 16     


 


24 18:00    136/90 94 Room Air  


 


24 17:00 98.1       


 


24 08:00       0 21





Laboratory Tests


24 04:22








Allergies:  


Coded Allergies:  


     doxycycline (Unverified  Allergy, Unknown, SWELLING, 12/15/24)


     iodine (Unverified  Allergy, Unknown, Hives, itching, 12/15/24)


Medications





Current Medications


Lorazepam 2 mg STK-MED ONCE .ROUTE;  Start 24 at 17:38;  Stop 24 at 

17:39;  Status DC


Lorazepam 1 mg ONCE  STAT IVP Last administered on 24at 17:46;  Start 

24 at 17:39;  Stop 24 at 17:40;  Status DC


Sodium Chloride 1,000 ml @  500 mls/hr Q2H STAT IV Last administered on 

24at 20:40;  Start 24 at 20:04;  Stop 24 at 22:03;  Status DC


Ceftriaxone Sodium 1 gm ONCE  STAT IVPB Last administered on 24at 20:42;  

Start 24 at 20:16;  Stop 24 at 20:18;  Status DC


Azithromycin 250 ml @  250 mls/hr Q24H  STAT IVPB Last administered on 

24at 20:42;  Start 24 at 20:16;  Stop 24 at 21:15;  Status DC


Piperacillin Sod/ Tazobactam Sod 3.375 gm Q12H IVPB Last administered on 

12/15/24at 09:36;  Start 24 at 21:00;  Stop 12/15/24 at 10:45;  Status DC


Sodium Chloride 50 ml AD IV;  Start 12/15/24 at 00:00;  Stop 24 at 21:15; 

Status DC


Sodium Chloride 50 ml 2042  ONCE IV;  Start 24 at 20:42;  Stop 24 at

21:14;  Status DC


Doxycycline Hyclate 250 ml @  125 mls/hr Q12H IV;  Start 12/15/24 at 12:00;  

Stop 12/15/24 at 10:45;  Status DC


Albuterol 1 UDVIAL [id number] IH Last administered on 24at 19:24;  Start 

12/15/24 at 00:00;  Stop 25 at 00:00


Furosemide 20 mg Q8H IV Last administered on 24at 14:37;  Start 24 

at 21:30;  Stop 25 at 21:29


Carvedilol 6.25 mg BIDMEALS PO Last administered on 24at 17:02;  Start 

24 at 22:00;  Stop 25 at 21:59


Insulin Human Regular INSULIN SLIDING SCAL... Q6H6 SQ Last administered on 

12/15/24at 18:22;  Start 12/15/24 at 00:00;  Stop 24 at 10:40;  Status DC


Dextrose 50 ml AD  PRN IV;  Start 24 at 21:30;  Stop 25 at 21:29


Glucagon 1 mg AD  PRN IM;  Start 24 at 21:30;  Stop 25 at 21:29


Methylprednisolone Sodium Succinate 125 mg ONCE  ONCE IVP Last administered on 

24at 22:11;  Start 24 at 22:00;  Stop 24 at 22:01;  Status DC


Methylprednisolone Sodium Succinate 40 mg BID IVP Last administered on 

12/15/24at 09:36;  Start 12/15/24 at 09:00;  Stop 12/15/24 at 10:45;  Status DC


Famotidine 20 mg DAILY IV Last administered on 24at 08:16;  Start 12/15/24

at 09:00;  Stop 25 at 08:59


Calcium Gluconate 1 gm/Sodium Chloride 100 ml @ 0 mls/hr PROTOCOL IV;  Start 

24 at 22:00;  Stop 25 at 21:59


Albuterol Sulfate 2.5MG Q4H  PRN IH;  Start 24 at 23:00;  Stop 25 at 

22:59


Acetaminophen 650 mg Q6H  PRN PO Last administered on 12/15/24at 17:35;  Start 

24 at 23:00;  Stop 12/15/24 at 17:42;  Status DC


Hydralazine HCl 10 mg Q6H  PRN IV Last administered on 24at 17:02;  Start 

24 at 23:00;  Stop 25 at 22:59


Heparin Sodium (Porcine) 5,000 unit BID SQ Last administered on 24at 

08:18;  Start 12/15/24 at 09:00;  Stop 25 at 08:59


Ondansetron HCl 4 mg Q6H  PRN IV Last administered on 24at 11:55;  Start 

24 at 23:00;  Stop 25 at 22:59


Heparin Sodium (Porcine) 5,000 unit BID SQ;  Start 12/15/24 at 09:00;  Stop 

24 at 23:12;  Status DC


Calcium Gluconate 1 gm/Sodium Chloride 100 ml @ 0 mls/hr PROTOCOL IV;  Start 

12/15/24 at 05:00;  Stop 12/15/24 at 04:51;  Status DC


Meropenem 500 mg/ Sodium Chloride 100 ml @  33.333 mls/ hr Q12H IV;  Start 

12/15/24 at 10:30;  Stop 12/15/24 at 10:48;  Status DC


Vancomycin HCl 1 each AD IV;  Start 12/15/24 at 10:30;  Stop 24 at 10:29


Levofloxacin/ Dextrose 100 ml @  100 mls/hr Q24H IV;  Start 12/15/24 at 11:00;  

Stop 12/15/24 at 10:53;  Status DC


Meropenem 500 mg Q12H IVPB Last administered on 24at 10:11;  Start 

12/15/24 at 11:00;  Stop 24 at 10:59


Levofloxacin/ Dextrose 50 ml @ 50 mls/hr Q48H IVPB Last administered on 

12/15/24at 12:40;  Start 12/15/24 at 11:00;  Stop 24 at 10:59


Vancomycin HCl 250 ml @  125 mls/hr ONCE  ONCE IV;  Start 12/15/24 at 11:00;  

Stop 12/15/24 at 11:03;  Status DC


Vancomycin HCl 250 ml @  125 mls/hr ONCE  ONCE IV Last administered on 

12/15/24at 12:40;  Start 12/15/24 at 11:30;  Stop 12/15/24 at 13:29;  Status DC


Vancomycin HCl 250 ml @  125 mls/hr Q48H IV;  Start 24 at 11:30;  Stop 

24 at 11:29


Acetaminophen 650 mg Q6H  PRN PO Last administered on 24at 15:16;  Start 

12/15/24 at 17:30;  Stop 25 at 17:29


Potassium Chloride 20 meq ONCE  ONCE PO Last administered on 24at 09:18;  

Start 24 at 09:00;  Stop 24 at 09:03;  Status DC


Gabapentin 100 mg TID PO Last administered on 24at 10:11;  Start 24 

at 10:00;  Stop 1/15/25 at 09:59


Hydromorphone HCl 1 mg Q4H  PRN IVP Last administered on 24at 17:03;  

Start 24 at 10:00;  Stop 24 at 09:59


Insulin Human Regular INSULIN SLIDING SCAL... ACHS SQ Last administered on 

24at 17:13;  Start 24 at 11:30;  Stop 1/15/25 at 11:29











TIERA VARELA PAC            Dec 16, 2024 20:46

## 2024-12-16 NOTE — NUR
[facility] Consult: 

Patient with low neel score with no wounds noted per primary nurse. Recommendations 
provided to primary nurse. Education provided.


-------------------------------------------------------------------------------

Addendum: 12/16/24 at 1657 by JENNIFER JACINTO RN RN/

-------------------------------------------------------------------------------

Amended: Links added.

## 2024-12-16 NOTE — PN
[facility] PROGRESS NOTE 





Date Patient Seen: Dec 16, 2024 


Time of Visit: 09:55


Supervising Physician: Rajan Reed





Primary Care Physician: Jonathan Keyes


Outpatient Specialists: [facility]


Inpatient Consults: Rajan Reed





PROBLEM LIST:


Acute hypoxic respiratory failure 


L1-L2 right osteophytes- concern for osteomyelitis


Right lower lobe pneumonia- possible HAP 


Sepsis with organ damage:  Renal POA


Right pleural effusion


Acute metabolic encephalopathy- resolving


Lower extremity and back pain


Acute renal failure secondary to ATN from sepsis on chronic kidney disease


Hyperglycemia in the setting of type 2 diabetes mellitus uncontrolled


History of diabetes mellitus, hypertension, hyperlipidemia, CHF, CKD, asthma, 

fem-pop, back surgery





INTERVAL HISTORY:


12/15 patient is awake, alert, knows her name and her daughter only. She is 

disoriented to time, place, and situation.  Overall patient is not in acute 

distress, dry lips, no tachypnea. Vital signs morning with blood pressure 

144/89, heart rate is 97, pulse is 97.  She remains on 3 L nasal cannula with 

sats of 98%.  Lab this morning with the WBC of 14 up from 12 hemoglobin is 10.6 

platelet count is 290.  Chemistry with bicarb of 22 creatinine is 3.4 this is 

down from 3.5 yesterday ammonia level is 15, glucose is 163.  Chest x-ray this 

morning with right pleural effusion and infiltrates.  Given recent 

hospitalization, we will up antibiotic coverage to HAP with meropenem and 

vancomycin. Add levaquin as well. Send MRSA swab and sputum culture. MRI of the 

brain is pending. No need for repeat Echo. No seizure. We can downgrade to PCCU.







12/16 patient is awake alert and oriented, not in acute distress.  Vital signs 

this morning with blood pressure 160/68 heart rate is 93 respiratory rate is 15.

 T-max is 99.7.  She remains on room air sat 94%.


Lab this morning with creatinine remains the same at 3.4 BUN 68.  Urine output 

is 2.5 L with balance-1.6 L. potassium is 3.4.  Give potassium x1.  Continue to 

trend renal function.


Patient had BM x1.  Lab this AM with WBC up to 18 from 14 hemoglobin is 10. 


Patient had CT scan of the back yesterday with L1-L2 right pedicle screws and 

bar, with disc space loss, endplate osteophytes and endplate sclerosis or 

lucency which concern for infection.  Patient is complaining of back pain and 

thigh pain.  We will start patient on neuropathy and pain management.


Patient has been seen by Dr. Riley MACEDO, we recommend to obtain 

consultation to him given worsening leukocytosis, fever, and pain to back. If he

is not available, we ll need to transfer her to facility with NSGY availability.




Consultation to ID will also be helpful. Discussed with hospitalist Dr. Lopez 

and patient/ daughter. 


Okay to downgrade. 





On behalf of [facility], we are thankful for your team to let us 

participate in the care of this patient.


We will be available if assistance in pulmonary/ critical care is needed.





REVIEW OF SYSTEMS: 


12 point ROS reviewed with patient. Pertinent positives mentioned above. 

Otherwise negative.





PHYSICAL EXAM: 


GENERAL: alert time one


HEENT: EOMI, Sclera non icteric, moist mucosa 


NECK: Supple, no JVD, trachea midline 


LUNGS:  Coarse crackles on lower lobes. No wheezes


HEART: Regular rate and rhythm. Normal S1 and S2, without murmurs 


ABD: Abdomen soft, nontender. Bowel sounds present


EXT: No clubbing cyanosis or edema


NEURO: Alert , no unilateral weakness.





                             Vital Signs (last 8hr)








  Date Time  Temp Pulse Resp B/P (MAP) Pulse Ox O2 Delivery O2 Flow Rate FiO2


 


12/16/24 09:00  93 15 163/79 94 Room Air  


 


12/16/24 08:16    164/68    


 


12/16/24 08:00     95 Room Air* 0 21


 


12/16/24 08:00  97 17 161/94 93 Room Air  


 


12/16/24 07:00 97.9 93 15 164/68 90 Nasal Cannula 2.0 


 


12/16/24 06:38  92 16     


 


12/16/24 06:38  92 16   N/Cannula Low lpm 2.0 28


 


12/16/24 05:00  89 25 155/82 97   


 


12/16/24 04:00     97 Nasal Cannula* 2 28


 


12/16/24 04:00 99.5 93 24 145/85 98 Nasal Cannula 2.0 


 


12/16/24 03:00  93 26 156/79 98   


 


12/16/24 02:00  95 19 154/78 97   











LABS:





                                Hematology Labs:








Test


 12/16/24


04:22 12/15/24


04:09 Range/Units


 


 


White Blood Count 18.5 H  4.8-10.8  K/uL


 


Red Blood Count


 3.30 L


 


 4.00-5.50


MIL/uL


 


Hemoglobin 10.2 L  12.0-16.0  g/dL


 


Hematocrit 32.0 L  36-48  %


 


Mean Corpuscular Volume 97.0   79-99  fL


 


Mean Corpuscular Hemoglobin 30.9   27.0-33.0  pg


 


Mean Corpuscular Hemoglobin


Concent 31.9 L


 


 32.0-36.0  g/dL





 


Red Cell Distribution Width 15.2   11.0-15.5  %


 


Platelet Count 259   130-400  K/uL


 


Mean Platelet Volume 11.7 H  7.5-10.5  fL


 


Segmented Neutrophils % 88 H  40-70  %


 


Band Neutrophils % 1   0-2  %


 


Lymphocytes % (Manual) 6 L  22-44  %


 


Monocytes % (Manual) 5   2-9  %


 


Nucleated Red Blood Cells 0.0   0.0-0.19  %


 


Differential Comment


 MANUAL


DIFFERENTIAL 


  





 


White Cell Morphology Comment See comments    


 


Platelet Morphology Comment ADEQUATE    


 


Red Blood Cell Morphology See comments    


 


Red Cell Morphology Comment     


 


Immature Granulocyte % (Auto)  0.5  0-1  %


 


Neutrophils (%) (Auto)  92.9 H 40.0-77.0  %


 


Lymphocytes (%) (Auto)  5.1 L 21.0-51.0  %


 


Monocytes (%) (Auto)  1.4 L 3.0-13.0  %


 


Eosinophils (%) (Auto)  0.0  0.0-8.0  %


 


Basophils (%) (Auto)  0.1  0.0-5.0  %


 


Neutrophils # (Auto)  13.5 H 1.8-7.7  K/uL


 


Lymphocytes # (Auto)  0.7 L 1.0-4.8  K/uL


 


Monocytes # (Auto)  0.2  0.1-1.0  K/uL


 


Eosinophils # (Auto)  0.00  0.00-0.70  K/uL


 


Basophils # (Auto)  0.01  0.00-0.20  K/uL


 


Absolute Immature Granulocyte


(auto 


 0.07 


 0-1  K/uL











                                 Chemistry Labs:








Test


 12/16/24


06:43 12/16/24


04:22 12/15/24


04:09 12/14/24


21:33 Range/Units


 


 


Whole Blood Glucose 120 H      MG/DL


 


Sodium Level  147 H   136-145  mmol/L


 


Potassium Level  3.4 L   3.5-5.1  mmol/L


 


Chloride Level  113 H   101-111  mmol/L


 


Carbon Dioxide Level  23    21-32  mmol/L


 


Blood Urea Nitrogen  68 H   7-18  mg/dL


 


Creatinine  3.4 H   0.5-1.0  mg/dL


 


Glomerular Filtration Rate


Calc 


 14 


 


 


 >90  mL/min





 


Random Glucose  117 H     mg/dL


 


Total Calcium  7.7 L   8.5-10.1  mg/dL


 


Magnesium Level


 


 1.80 


 


 


 1.80-2.40


mg/dL


 


Procalcitonin  < 0.05 L   0.05-0.5  ng/mL


 


Hemoglobin A1c   9.7 H  4.0-6.0  %


 


Estimated Average Glucose


(eAG) 


 


 232 H


 


   mg/dL





 


Phosphorus Level   7.3 H  2.5-4.9  mg/dL


 


Lactic Acid Level    1.6  0.8-2.5  mmol/L


 


Thyroid Stimulating Hormone


(TSH) 


 


 


 3.15 


 0.36-3.74


uIU/mL


 


Test


 12/14/24


17:36 


 


 


 Range/Units


 


 


Ammonia 15     11-32  umol/L


 


Troponin I High Sensitivity 9     4-50  ng/L


 


B-Type Natriuretic Peptide 611 H    0-100  pg/mL











DIAGNOSTICS / RADIOLOGY RESULTS:


[ ]





PLAN





NEURO: 


Minimize central acting medications as possible. 


Fall Precautions.


Well lighted room through the day and minimize interruptions through the night 

to prevent acute delirium. 





PULMONARY: 


Supplemental 02 as needed


Titrate Fio2 to keep Spo2 > or = 90% 


DuoNebs and CPT as needed


DuoNebs every 8 hours


Repeat ABG stat


IS hourly while awake for pulmonary hygiene


Out of bed to chair as tolerated 


VAP Bundle 








CARDIOVASCULAR: 


Follow hemodynamics. 


Titrate vasopressor to keep MAP >65 or systolic blood pressure >95mmHg 


Obtain 2D echo cardiology to read


Start Coreg 6.25 mg p.o. b.i.d. and Lasix 20 mg IV every 8 hours


Consult patient cardiologist





DRIPS: 


NA





LINES: 


PIV





GI & NUTRITION:


Continue nutritional support


Keep patient NPO


Aspirations precautions


Prokinetic agents and laxatives as needed 





KIDNEYS & ELECTROLYTES: 


Strict monitoring of intake and output 


Consult patient nephrologist


Daily weights 


Avoid nephrotoxic agents


Monitor electrolytes and replace as needed


Goal urine output of 30mL/hr or 0.5mL/kg/hr 


   





ENDOCRINE:


Maintain blood glucose between 100-180 at all times.


Insulin sliding scale for blood glucose management 


Obtain hemoglobin A1c





INFECTIOUS DISEASE: 


Trend temperature. 


Pan-culture if febrile.


Status Zosyn 3.375 g IV every 8 hours and doxycycline 100 mg IV every 12 hours


Blood culture x2





Micro: 


Sputum


Blood


Urine





Antibiotics: 


Merem


Vanc


Levaquin





HEMATOLOGY & COAGULATION: 


Monitor H&H.  Keep Hgb > 7


Transfuse 1 unit of PRBC for Hgb < 7


Transfuse 1 pack of platelets of platelets < 20, 000


Watch for any signs and symptoms of bleeding





SKIN:


Pressure ulcer prevention per facility protocol


Turn patient every 2 hours





Rehab: PT/OT 


Prophylaxis:


GI: [Famotidine ]


DVT:  [SCDs ]





Code Status: Full Resuscitation


Disposition: PCCU





Other:


Total patient care time exceeds 35 minutes excluding all procedures.








Case was discussed and seen with my supervising physician Dr DANE Tolentino.  The 

above plan was formulated and agreed upon. 














TANNER PERRY [facility]        Dec 16, 2024 09:59

## 2024-12-17 VITALS — RESPIRATION RATE: 19 BRPM | HEART RATE: 98 BPM

## 2024-12-17 VITALS — OXYGEN SATURATION: 99 %

## 2024-12-17 VITALS — SYSTOLIC BLOOD PRESSURE: 133 MMHG | HEART RATE: 102 BPM | RESPIRATION RATE: 19 BRPM | DIASTOLIC BLOOD PRESSURE: 81 MMHG

## 2024-12-17 VITALS — DIASTOLIC BLOOD PRESSURE: 89 MMHG | HEART RATE: 98 BPM | SYSTOLIC BLOOD PRESSURE: 144 MMHG | RESPIRATION RATE: 20 BRPM

## 2024-12-17 VITALS — RESPIRATION RATE: 18 BRPM | HEART RATE: 121 BPM

## 2024-12-17 VITALS — HEART RATE: 90 BPM | RESPIRATION RATE: 16 BRPM

## 2024-12-17 VITALS — DIASTOLIC BLOOD PRESSURE: 91 MMHG | HEART RATE: 100 BPM | RESPIRATION RATE: 12 BRPM | SYSTOLIC BLOOD PRESSURE: 163 MMHG

## 2024-12-17 VITALS — HEART RATE: 89 BPM | RESPIRATION RATE: 19 BRPM

## 2024-12-17 VITALS — HEART RATE: 89 BPM | OXYGEN SATURATION: 97 % | RESPIRATION RATE: 18 BRPM

## 2024-12-17 VITALS — RESPIRATION RATE: 18 BRPM | OXYGEN SATURATION: 95 % | HEART RATE: 92 BPM

## 2024-12-17 VITALS — HEART RATE: 93 BPM | RESPIRATION RATE: 12 BRPM | DIASTOLIC BLOOD PRESSURE: 88 MMHG | SYSTOLIC BLOOD PRESSURE: 171 MMHG

## 2024-12-17 VITALS — RESPIRATION RATE: 12 BRPM | HEART RATE: 98 BPM

## 2024-12-17 VITALS
RESPIRATION RATE: 18 BRPM | TEMPERATURE: 97.9 F | DIASTOLIC BLOOD PRESSURE: 95 MMHG | SYSTOLIC BLOOD PRESSURE: 164 MMHG | HEART RATE: 97 BPM

## 2024-12-17 VITALS — SYSTOLIC BLOOD PRESSURE: 154 MMHG | DIASTOLIC BLOOD PRESSURE: 85 MMHG | HEART RATE: 96 BPM | RESPIRATION RATE: 11 BRPM

## 2024-12-17 VITALS — HEART RATE: 99 BPM | RESPIRATION RATE: 12 BRPM

## 2024-12-17 VITALS — HEART RATE: 90 BPM | RESPIRATION RATE: 12 BRPM

## 2024-12-17 VITALS
TEMPERATURE: 98.2 F | DIASTOLIC BLOOD PRESSURE: 89 MMHG | SYSTOLIC BLOOD PRESSURE: 148 MMHG | RESPIRATION RATE: 18 BRPM | HEART RATE: 84 BPM

## 2024-12-17 VITALS — DIASTOLIC BLOOD PRESSURE: 71 MMHG | SYSTOLIC BLOOD PRESSURE: 124 MMHG | RESPIRATION RATE: 12 BRPM | HEART RATE: 87 BPM

## 2024-12-17 VITALS — RESPIRATION RATE: 16 BRPM | HEART RATE: 86 BPM

## 2024-12-17 VITALS — HEART RATE: 97 BPM | RESPIRATION RATE: 14 BRPM

## 2024-12-17 VITALS — HEART RATE: 89 BPM | RESPIRATION RATE: 16 BRPM

## 2024-12-17 VITALS — SYSTOLIC BLOOD PRESSURE: 159 MMHG | DIASTOLIC BLOOD PRESSURE: 84 MMHG | HEART RATE: 95 BPM | RESPIRATION RATE: 12 BRPM

## 2024-12-17 VITALS — RESPIRATION RATE: 10 BRPM | DIASTOLIC BLOOD PRESSURE: 101 MMHG | HEART RATE: 97 BPM | SYSTOLIC BLOOD PRESSURE: 166 MMHG

## 2024-12-17 VITALS — HEART RATE: 99 BPM | SYSTOLIC BLOOD PRESSURE: 138 MMHG | DIASTOLIC BLOOD PRESSURE: 90 MMHG | RESPIRATION RATE: 16 BRPM

## 2024-12-17 VITALS — SYSTOLIC BLOOD PRESSURE: 150 MMHG | HEART RATE: 92 BPM | RESPIRATION RATE: 18 BRPM | DIASTOLIC BLOOD PRESSURE: 92 MMHG

## 2024-12-17 VITALS — HEART RATE: 90 BPM | RESPIRATION RATE: 19 BRPM

## 2024-12-17 VITALS
DIASTOLIC BLOOD PRESSURE: 101 MMHG | RESPIRATION RATE: 8 BRPM | HEART RATE: 97 BPM | SYSTOLIC BLOOD PRESSURE: 170 MMHG | TEMPERATURE: 98 F

## 2024-12-17 VITALS — HEART RATE: 99 BPM | RESPIRATION RATE: 20 BRPM

## 2024-12-17 VITALS — SYSTOLIC BLOOD PRESSURE: 162 MMHG | RESPIRATION RATE: 8 BRPM | HEART RATE: 85 BPM | DIASTOLIC BLOOD PRESSURE: 99 MMHG

## 2024-12-17 VITALS — DIASTOLIC BLOOD PRESSURE: 96 MMHG | SYSTOLIC BLOOD PRESSURE: 161 MMHG | RESPIRATION RATE: 10 BRPM | HEART RATE: 96 BPM

## 2024-12-17 VITALS — RESPIRATION RATE: 16 BRPM | HEART RATE: 93 BPM

## 2024-12-17 VITALS — DIASTOLIC BLOOD PRESSURE: 90 MMHG | SYSTOLIC BLOOD PRESSURE: 164 MMHG | RESPIRATION RATE: 19 BRPM | HEART RATE: 99 BPM

## 2024-12-17 VITALS — HEART RATE: 101 BPM | RESPIRATION RATE: 16 BRPM

## 2024-12-17 VITALS — RESPIRATION RATE: 18 BRPM | HEART RATE: 90 BPM

## 2024-12-17 VITALS — RESPIRATION RATE: 15 BRPM | HEART RATE: 88 BPM | OXYGEN SATURATION: 95 %

## 2024-12-17 VITALS — HEART RATE: 99 BPM | SYSTOLIC BLOOD PRESSURE: 150 MMHG | RESPIRATION RATE: 20 BRPM | DIASTOLIC BLOOD PRESSURE: 81 MMHG

## 2024-12-17 VITALS — RESPIRATION RATE: 12 BRPM | HEART RATE: 89 BPM

## 2024-12-17 VITALS — HEART RATE: 96 BPM | RESPIRATION RATE: 14 BRPM

## 2024-12-17 VITALS — OXYGEN SATURATION: 95 %

## 2024-12-17 VITALS — HEART RATE: 90 BPM | RESPIRATION RATE: 18 BRPM

## 2024-12-17 VITALS — HEART RATE: 144 BPM | RESPIRATION RATE: 12 BRPM

## 2024-12-17 VITALS — RESPIRATION RATE: 13 BRPM | HEART RATE: 147 BPM

## 2024-12-17 VITALS — TEMPERATURE: 98.1 F

## 2024-12-17 VITALS — HEART RATE: 98 BPM | RESPIRATION RATE: 20 BRPM

## 2024-12-17 VITALS — RESPIRATION RATE: 12 BRPM | HEART RATE: 99 BPM

## 2024-12-17 VITALS — RESPIRATION RATE: 20 BRPM | HEART RATE: 98 BPM

## 2024-12-17 LAB
ALBUMIN SERPL-MCNC: 1.6 G/DL (ref 3.5–5)
ALT SERPL-CCNC: 23 U/L (ref 12–78)
AST SERPL-CCNC: 24 U/L (ref 10–37)
BASOPHILS # BLD AUTO: 0.03 K/UL (ref 0–0.2)
BASOPHILS NFR BLD AUTO: 0.2 % (ref 0–5)
BILIRUB SERPL-MCNC: 0.2 MG/DL (ref 0.2–1)
BUN SERPL-MCNC: 68 MG/DL (ref 7–18)
CHLORIDE SERPL-SCNC: 111 MMOL/L (ref 101–111)
CO2 SERPL-SCNC: 24 MMOL/L (ref 21–32)
CREAT SERPL-MCNC: 3.5 MG/DL (ref 0.5–1)
EOSINOPHIL # BLD AUTO: 0.21 K/UL (ref 0–0.7)
EOSINOPHIL NFR BLD AUTO: 1.3 % (ref 0–8)
ERYTHROCYTE [DISTWIDTH] IN BLOOD BY AUTOMATED COUNT: 15.2 % (ref 11–15.5)
FERRITIN SERPL-MCNC: 441 NG/ML (ref 15–150)
GFR SERPL CREATININE-BSD FRML MDRD: 14 ML/MIN (ref 90–?)
GLUCOSE SERPL-MCNC: 160 MG/DL (ref 70–105)
HCT VFR BLD AUTO: 32.9 % (ref 36–48)
IMM GRANULOCYTES # BLD: 0.11 K/UL (ref 0–1)
IRON SATN MFR SERPL: 24.6 % (ref 22–44)
IRON SERPL-MCNC: 47 MCG/DL (ref 50–170)
LYMPHOCYTES # SPEC AUTO: 1.4 K/UL (ref 1–4.8)
LYMPHOCYTES NFR SPEC AUTO: 8.8 % (ref 21–51)
MCH RBC QN AUTO: 31 PG (ref 27–33)
MCHC RBC AUTO-ENTMCNC: 31.9 G/DL (ref 32–36)
MCV RBC AUTO: 97.1 FL (ref 79–99)
MONOCYTES # BLD AUTO: 1.1 K/UL (ref 0.1–1)
MONOCYTES NFR BLD AUTO: 7.1 % (ref 3–13)
NEUTROPHILS # BLD AUTO: 13 K/UL (ref 1.8–7.7)
NEUTROPHILS NFR BLD AUTO: 81.9 % (ref 40–77)
NRBC BLD MANUAL-RTO: 0 % (ref 0–0.19)
PHOSPHATE SERPL-MCNC: 6.4 MG/DL (ref 2.5–4.9)
PLATELET # BLD AUTO: 244 K/UL (ref 130–400)
POTASSIUM SERPL-SCNC: 3.6 MMOL/L (ref 3.5–5.1)
PROT SERPL-MCNC: 5.2 G/DL (ref 6–8.3)
RBC # BLD AUTO: 3.39 MIL/UL (ref 4–5.5)
SODIUM SERPL-SCNC: 145 MMOL/L (ref 136–145)
TIBC SERPL-MCNC: 191 MCG/DL (ref 250–450)
WBC # BLD AUTO: 15.9 K/UL (ref 4.8–10.8)

## 2024-12-17 NOTE — PN
NEPHROLOGY PROGRESS NOTE








Date/Time Patient Seen: Dec 17, 2024





SUBJECTIVE:


This is a 69 year old female with a past medical history of congestive heart 

failure history, hypertension, hyperlipidemia and chronic low back pain. 


She presented to the emergency room with complaints of altered mental status.


Patient has a recent hospital admission for similar reason and was discharged on

12/11/2024


Blood culture has been negative 


She continues on broad-spectrum antibiotics, including vancomycin.


Imaging studies were noted.


Pending further cardiology and ID recommendations


Neurosurgery has been consulted for possible lumbar spines osteomyelitis


She was noted to have elevated BUN/creatinine.  


We are consulted for renal failure.  


Renal function has remained stable 


Electrolytes are stable.  


Hemoglobin is stable at 10.5 


Iron panel was noted.


She was seen in the ICU, in no acute distress


She is complaining of mouth sores.


Condition is critical and guarded





REVIEW OF SYSTEMS:


GENERAL:  Positive for generalized weakness


NEUROLOGIC: Negative for any blurry vision, blind spots, double vision, facial 

asymmetry, dysphagia, dysarthria, hemiparesis, hemisensory deficits, vertigo, 

ataxia.


HEENT: Negative for any head trauma, neck trauma, neck stiffness, photophobia, 

phonophobia, sinusitis, rhinitis.


CARDIAC: Negative for any chest pain, dyspnea on exertion, paroxysmal nocturnal 

dyspnea, peripheral edema.


PULMONARY: Negative for any shortness of breath, wheezing, COPD, or TB exposure.


GASTROINTESTINAL: Negative for any abdominal pain, nausea, vomiting, bright red 

blood per rectum, melena.


GENITOURINARY: Negative for any dysuria, hematuria, incontinence.


INTEGUMENTARY: Negative for any rashes, cuts, insect bites.


RHEUMATOLOGIC: Negative for any joint pains, photosensitive rashes, history of 

vasculitis or kidney problems.


HEMATOLOGIC: Negative for any abnormal bruising, frequent infections or 

bleeding.





                             Vital Signs (last 8hr)








  Date Time  Temp Pulse Resp B/P (MAP) Pulse Ox O2 Delivery O2 Flow Rate FiO2


 


12/16/24 12:03  82 16     


 


12/16/24 12:00 98.4 99 23 158/85 94 Room Air  


 


12/16/24 11:00  83 10 170/86 93 Room Air  


 


12/16/24 10:00  82 12 174/89 96 Room Air  


 


12/16/24 09:00  93 15 163/79 94 Room Air  


 


12/16/24 08:16    164/68    


 


12/16/24 08:00     95 Room Air* 0 21


 


12/16/24 08:00  97 17 161/94 93 Room Air  


 


12/16/24 07:00 97.9 93 15 164/68 90 Nasal Cannula 2.0 


 


12/16/24 06:38  92 16     


 


12/16/24 06:38  92 16   N/Cannula Low lpm 2.0 28








PHYSICAL EXAM:


GENERAL: Alert and oriented x 3. No acute distress. Well-nourished.


EYES: EOMI. Anicteric.


HENT: Moist mucous membranes. No scleral icterus. No cervical lymphadenopathy.


LUNGS: Clear to auscultation bilaterally. No accessory muscle use.


CARDIOVASCULAR: Regular rate and rhythm. No murmur. No JVD.


ABDOMEN: Soft, non-tender and non-distended. No palpable masses.


EXTREMITIES: No edema. Non-tender.


SKIN: No rashes or lesions. Warm.


NEUROLOGIC: No focal neurological deficits. CN II-XII grossly intact, but not 

individually tested.


PSYCHIATRIC: Cooperative. Appropriate mood and affect.








                               Current Medications








 Medications


  (Trade)  Dose


 Ordered  Sig/Chaim


 Route


 PRN Reason  Start Time


 Stop Time Status Last Admin


Dose Admin


 


 Acetaminophen


  (TYLenol 325MG


 TAB)  650 mg  Q6H  PRN


 PO


 TEMPERATURE GREATER THAN 101.5  12/14/24 23:00


 12/15/24 17:42 DC 12/15/24 17:35


650 MG


 


 Acetaminophen


  (TYLenol 325MG


 TAB)  650 mg  Q6H  PRN


 PO


 MILD PAIN (1-3)  12/15/24 17:30


 1/14/25 17:29  12/16/24 08:15


650 MG


 


 Albuterol


  (DUOneb)  1 UDVIAL  [id number]


 IH


   12/15/24 00:00


 1/14/25 00:00  12/16/24 12:02


1 UDVIAL


 


 Albuterol Sulfate


  (Proventil


 0.083% 2.5mg/3ml)  2.5MG  Q4H  PRN


 IH


 SHORTNESS OF BREATH  12/14/24 23:00


 1/13/25 22:59   





 


 Azithromycin  250 ml @ 


 250 mls/hr  Q24H  STAT


 IVPB


   12/14/24 20:16


 12/14/24 21:15 DC 12/14/24 20:42


250 MLS/HR


 


 Calcium Gluconate


 1 gm/Sodium


 Chloride  100 ml @ 0


 mls/hr  PROTOCOL


 IV


   12/14/24 22:00


 1/13/25 21:59   





 


 Calcium Gluconate


 1 gm/Sodium


 Chloride  100 ml @ 0


 mls/hr  PROTOCOL


 IV


   12/15/24 05:00


 12/15/24 04:51 DC  





 


 Carvedilol


  (Coreg 6.25MG)  6.25 mg  BIDMEALS


 PO


   12/14/24 22:00


 1/13/25 21:59  12/16/24 08:16


6.25 MG


 


 Ceftriaxone Sodium


  (ROCEphine 1G


 INJ)  1 gm  ONCE  STAT


 IVPB


   12/14/24 20:16


 12/14/24 20:18 DC 12/14/24 20:42


1 GM


 


 Dextrose


  (D50w)  50 ml  AD  PRN


 IV


 HYPOGLYCEMIA PROTOCOL  12/14/24 21:30


 1/13/25 21:29   





 


 Doxycycline


 Hyclate  250 ml @ 


 125 mls/hr  Q12H


 IV


   12/15/24 12:00


 12/15/24 10:45 DC  





 


 Famotidine


  (Pepcid 20mg


 Vial)  20 mg  DAILY


 IV


   12/15/24 09:00


 1/14/25 08:59  12/16/24 08:16


20 MG


 


 Furosemide


  (LASix 20MG VIAL)  20 mg  Q8H


 IV


   12/14/24 21:30


 1/13/25 21:29  12/16/24 05:44


20 MG


 


 Gabapentin


  (NEURontin 100


 mg CAP)  100 mg  TID


 PO


   12/16/24 10:00


 1/15/25 09:59  12/16/24 10:11


100 MG


 


 Glucagon


  (Glucagon 1mg


 Kit)  1 mg  AD  PRN


 IM


 HYPOGLYCEMIA PROTOCOL  12/14/24 21:30


 1/13/25 21:29   





 


 Heparin Sodium


  (Porcine)


  (HEParin 5,000


 UNIT VIAL)  5,000 unit  BID


 SQ


   12/15/24 09:00


 1/14/25 08:59  12/16/24 08:18


5,000 UNIT


 


 Heparin Sodium


  (Porcine)


  (HEParin 5,000


 UNIT VIAL)  5,000 unit  BID


 SQ


   12/15/24 09:00


 12/14/24 23:12 DC  





 


 Hydralazine HCl


  (APRESOLine 20MG


 INJ)  10 mg  Q6H  PRN


 IV


 For:SBP above 160;DBP above 90  12/14/24 23:00


 1/13/25 22:59  12/16/24 10:12


10 MG


 


 Hydromorphone HCl


  (DiLAUDid 1MG


 INJ)  1 mg  Q4H  PRN


 IVP


 SEVERE PAIN (7-10)  12/16/24 10:00


 12/21/24 09:59  12/16/24 10:43


1 MG


 


 Insulin Human


 Regular


  (humuLIN R 100


 UNIT/ML 3ML)  INSULIN


 SLIDING


 SCAL...  ACHS


 SQ


   12/16/24 11:30


 1/15/25 11:29   





 


 Insulin Human


 Regular


  (humuLIN R 100


 UNIT/ML 3ML)  INSULIN


 SLIDING


 SCAL...  Q6H6


 SQ


   12/15/24 00:00


 12/16/24 10:40 DC 12/15/24 18:22


3 UNIT


 


 Levofloxacin/


 Dextrose  50 ml @ 50


 mls/hr  Q48H


 IVPB


   12/15/24 11:00


 12/25/24 10:59  12/15/24 12:40


50 MLS/HR


 


 Levofloxacin/


 Dextrose  100 ml @ 


 100 mls/hr  Q24H


 IV


   12/15/24 11:00


 12/15/24 10:53 DC  





 


 Lorazepam


  (AtiVAN)  1 mg  ONCE  STAT


 IVP


   12/14/24 17:39


 12/14/24 17:40 DC 12/14/24 17:46


1 MG


 


 Meropenem


  (Merrem)  500 mg  Q12H


 IVPB


   12/15/24 11:00


 12/25/24 10:59  12/16/24 10:11


500 MG


 


 Meropenem 500 mg/


 Sodium Chloride  100 ml @ 


 33.333 mls/


 hr  Q12H


 IV


   12/15/24 10:30


 12/15/24 10:48 DC  





 


 Methylprednisolone


 Sodium Succinate


  (Solu-medROL


 40MG)  40 mg  BID


 IVP


   12/15/24 09:00


 12/15/24 10:45 DC 12/15/24 09:36


40 MG


 


 Ondansetron HCl


  (zoFRAN 4MG INJ)  4 mg  Q6H  PRN


 IV


 NAUSEA/VOMITING  12/14/24 23:00


 1/13/25 22:59  12/16/24 11:55


4 MG


 


 Piperacillin Sod/


 Tazobactam Sod


  (Zosyn


 3.375gm+NS 50ml)  3.375 gm  Q12H


 IVPB


   12/14/24 21:00


 12/15/24 10:45 DC 12/15/24 09:36


3.375 GM


 


 Sodium Chloride  1,000 ml @ 


 500 mls/hr  Q2H STAT


 IV


   12/14/24 20:04


 12/14/24 22:03 DC 12/14/24 20:40


500 MLS/HR


 


 Sodium Chloride


  (NS 50ml)  50 ml  AD


 IV


   12/15/24 00:00


 12/14/24 21:15 DC  





 


 Vancomycin HCl  250 ml @ 


 125 mls/hr  Q48H


 IV


   12/17/24 11:30


 12/27/24 11:29   





 


 Vancomycin HCl


  (Vancomycin


 Protocol)  1 each  AD


 IV


   12/15/24 10:30


 12/29/24 10:29   














LABORATORY:


[ ]








                                Hematology Labs:








Test


 12/17/24


04:12 12/16/24


04:22 Range/Units


 


 


White Blood Count 15.9 H  4.8-10.8  K/uL


 


Red Blood Count


 3.39 L


 


 4.00-5.50


MIL/uL


 


Hemoglobin 10.5 L  12.0-16.0  g/dL


 


Hematocrit 32.9 L  36-48  %


 


Mean Corpuscular Volume 97.1   79-99  fL


 


Mean Corpuscular Hemoglobin 31.0   27.0-33.0  pg


 


Mean Corpuscular Hemoglobin


Concent 31.9 L


 


 32.0-36.0  g/dL





 


Red Cell Distribution Width 15.2   11.0-15.5  %


 


Platelet Count 244   130-400  K/uL


 


Mean Platelet Volume 11.6 H  7.5-10.5  fL


 


Immature Granulocyte % (Auto) 0.7   0-1  %


 


Neutrophils (%) (Auto) 81.9 H  40.0-77.0  %


 


Lymphocytes (%) (Auto) 8.8 L  21.0-51.0  %


 


Monocytes (%) (Auto) 7.1   3.0-13.0  %


 


Eosinophils (%) (Auto) 1.3   0.0-8.0  %


 


Basophils (%) (Auto) 0.2   0.0-5.0  %


 


Neutrophils # (Auto) 13.0 H  1.8-7.7  K/uL


 


Lymphocytes # (Auto) 1.4   1.0-4.8  K/uL


 


Monocytes # (Auto) 1.1 H  0.1-1.0  K/uL


 


Eosinophils # (Auto) 0.21   0.00-0.70  K/uL


 


Basophils # (Auto) 0.03   0.00-0.20  K/uL


 


Absolute Immature Granulocyte


(auto 0.11 


 


 0-1  K/uL





 


Nucleated Red Blood Cells 0.0   0.0-0.19  %


 


Segmented Neutrophils %  88 H 40-70  %


 


Band Neutrophils %  1  0-2  %


 


Lymphocytes % (Manual)  6 L 22-44  %


 


Monocytes % (Manual)  5  2-9  %


 


Differential Comment


 


 MANUAL


DIFFERENTIAL  





 


White Cell Morphology Comment  See comments   


 


Platelet Morphology Comment  ADEQUATE   


 


Red Blood Cell Morphology  See comments   


 


Red Cell Morphology Comment     








                                 Chemistry Labs:








Test


 12/17/24


12:14 12/17/24


04:12 12/16/24


04:22 Range/Units


 


 


Whole Blood Glucose 180 H     MG/DL


 


Sodium Level  145   136-145  mmol/L


 


Potassium Level  3.6   3.5-5.1  mmol/L


 


Chloride Level  111   101-111  mmol/L


 


Carbon Dioxide Level  24   21-32  mmol/L


 


Blood Urea Nitrogen  68 H  7-18  mg/dL


 


Creatinine  3.5 H  0.5-1.0  mg/dL


 


Glomerular Filtration Rate


Calc 


 14 


 


 >90  mL/min





 


Random Glucose  160 H    mg/dL


 


Total Calcium  8.0 L  8.5-10.1  mg/dL


 


Phosphorus Level  6.4 H  2.5-4.9  mg/dL


 


Iron Level  47 #L    mcg/dL


 


Total Iron Binding Capacity  191 L  250-450  mcg/dL


 


Percent Iron Saturation  24.6   22-44  %


 


Ferritin  441 H    ng/mL


 


Total Bilirubin  0.2   0.2-1.0  mg/dL


 


Aspartate Amino Transf


(AST/SGOT) 


 24 


 


 10-37  U/L





 


Alanine Aminotransferase


(ALT/SGPT) 


 23 


 


 12-78  U/L





 


Alkaline Phosphatase  74     U/L


 


Total Protein  5.2 L  6.0-8.3  g/dL


 


Albumin  1.6 L  3.5-5.0  g/dL


 


Procalcitonin  0.05   0.05-0.5  ng/mL


 


Magnesium Level


 


 


 1.80 


 1.80-2.40


mg/dL


 


Parathyroid Hormone (Intact)   104.8  15-65  pg/mL











DIAGNOSTICS / RADIOLOGY:





REASON: AMS


ORDERING PHYSICIAN: JOHN PAUL CONDE


PROCEDURE: BRAIN WO  -  MR BRAIN WO CON





MR BRAIN WO CON





HISTORY: Altered mental status





COMPARISON: None





TECHNIQUE: MRI of the brain was performed utilizing multiple pulse


sequences in axial, coronal and sagittal planes. Patient was not given


contrast through intravenous route.





FINDINGS: The ventricles and extraventricular CSF spaces are dilated


consistent with cerebral atrophy. Nonspecific white matter changes are


seen. There is no midline shift, mass effect or herniation. No subacute


hemorrhage is seen. No MR evidence of acute infarct is seen in the


diffusion weighted images. Cerebellar tonsils are in normal position.


There are bilateral ethmoid and sphenoid sinusitis with mucoperiosteal


thickening.  No MR evidence of a mass lesion is seen in this noncontrast


study.





IMPRESSION: 


1. No MR evidence of acute infarct is seen in the diffusion weighted


images. Atrophy with white matter changes.








DICTATED BY: SHAQ ZUNIGA MD


DATE: 12/16/24 1655


REASON: PNEUMONIA


ORDERING PHYSICIAN: ISAAC JUÁREZ MD


PROCEDURE: CHEST WO  -  CT CHEST W/O CONTRAST





CT CHEST W/O CONTRAST





HISTORY:   Pneumonia





COMPARISON: None





TECHNIQUE: Multiple sequential axial images of the chest were obtained


from the thoracic inlet through upper abdomen. Patient was not given


contrast through intravenous route.





FINDINGS: There are bilateral pleural effusions with compressive


atelectasis. Coronary arterial calcifications are seen. Gallbladder is


distended. There is mild anasarca.  No pleural effusion or pericardial


effusion is seen. There is no evidence of pneumothorax. There are normal


size mediastinal and hilar lymph nodes. The heart is not enlarged.


Degenerative changes of the thoracolumbar spine are present.  There is


no evidence of adrenal nodule.





IMPRESSION: 


1. Mild to moderate bilateral pleural effusions with compressive


atelectasis.














CT was performed with one or more following dose reduction techniques:


automated exposure control, adjustment of the mA and kv according to


patient's size, or use of a iterative reconstruction technique.








DICTATED BY: SHAQ ZUNIGA MD


DATE: 12/16/24 1715





REASON: Rule out ostemyelitis


ORDERING PHYSICIAN: TANNER PERRY CNP


PROCEDURE: L SPN WO  -  MR SPINAL CANAL, LUMBAR WO CON





MR SPINAL CANAL, LUMBAR WO CON





HISTORY: Pain





COMPARISON: CT from 12/15/2024





TECHNIQUE: MRI of the lumbar spine was performed utilizing multiple


pulse sequences in axial and sagittal plane. Patient was not given


contrast through intravenous route. 





FINDINGS: Endplate degenerative changes with disc space narrowing are


seen predominantly involving the L1-L2 level. Orthopedic fixation plates


and screws are seen traversing the T12 and L1 with paramagnetic


susceptibility artifacts limiting evaluation. There is disc noted at the


lumbosacral junction and for numbering purposes only, this level will be


designated as S1-S2. No abnormal signal intensity is seen of the


visualized bony structure.  No loss of vertebral height is seen.  There


is straightening of normal lumbar curvature which may be related to


muscle spasm or positioning. Degenerative disc signals are present at


all lumbar spine levels. Visualized distal conus is unremarkable.





At the L2-3 level,  there is spondylotic with central disc


protrusion/herniation with bilateral ligamentum flavum hypertrophy


causing anterior thecal sac compression with bilateral lateral recess


stenosis and bilateral neural foraminal stenosis. The thecal sac


measures approximately 7 mm in its anterior posterior dimension.





At the L3-4 level,  there is spondylotic disc with bilateral ligamentum


flavum hypertrophy causing anterior thecal sac compression with


bilateral lateral recess stenosis and bilateral neural foraminal


stenosis. The thecal sac measures approximately 6.2 mm in its anterior


posterior dimension.





At the L4-5 level,  there is spondylotic disc with bilateral ligamentum


flavum hypertrophy causing anterior thecal sac compression with


bilateral lateral recess stenosis and bilateral neural foraminal


stenosis. The thecal sac measures approximately 8.4 mm in its anterior


posterior dimension.





At the L5-S1 level,  there is spondylotic disc with bilateral ligamentum


flavum hypertrophy and facet hypertrophy causing anterior thecal sac


compression with bilateral lateral recess stenosis and bilateral neural


foraminal stenosis. The thecal sac measures approximately 8.2 mm in its


anterior posterior dimension.





IMPRESSION:


1. DJD with lumbar spine spondylosis as described above. Evaluation at


T12 and L1 are limited due to postop artifacts.











DICTATED BY: SHAQ ZUNIGA MD


DATE: 12/16/24 1650





REASON: sepsis, backpain, ruling out device infection


ORDERING PHYSICIAN: TANNER PERRY CNP


PROCEDURE: T SPINE WO  -  CT THORACIC SPINE W/O CONTRAST





CT THORACIC SPINE W/O CONTRAST, CT LUMBAR SPINE W/O CONTRAST





HISTORY: sepsis, back pain, ruling out device infection





TECHNIQUE: CT THORACIC SPINE W/O CONTRAST, CT LUMBAR SPINE W/O CONTRAST.


Coronal and sagittal reformats were obtained. CT was performed with one


or more of the following dose reduction techniques: Automated exposure


control, adjustment of the mA and/or kV according to the patient's size,


or use of the iterative reconstruction technique.








FINDINGS: 


Evaluation of the cord and discs is limited with CT. 


No evidence of compression fracture or dislocation.  


There is diffuse osteopenia degraded evaluation of the study.


Multilevel degenerative changes are noted. Postop changes of posterior


fusion are seen at L1-L2 on the right with pedicle screws and fixation


bar. Advanced degenerative changes are seen at L2-L3 with disc space


loss, endplate osteophytes and endplate sclerosis/lucencies, underlying


infection is not excluded. Correlate clinically. Stimulator leads are


seen in the lower thoracic spine.


Small left and moderate right pleural effusion with bilateral lower lobe


consolidation.


The aorta is normal in caliber. No acute findings in the visualized


abdomen.  The visualized lungs are clear. 








IMPRESSION: 


There is diffuse osteopenia degraded evaluation of the study.


Multilevel degenerative changes are noted. Postop changes of posterior


fusion are seen at L1-L2 on the right with pedicle screws and fixation


bar. Advanced degenerative changes are seen at L2-L3 with disc space


loss, endplate osteophytes and endplate sclerosis/lucencies, underlying


infection is not excluded. Correlate clinically. Stimulator leads are


seen in the lower thoracic spine.


Small left and moderate right pleural effusion with bilateral lower lobe


consolidation..




















DICTATED BY: TUCKER RAYO MD


DATE: 12/15/24 1245





REASON: sepsis, backpain, ruling out device infection


ORDERING PHYSICIAN: TANNER PERRY CNP


PROCEDURE: L SPIN WO  -  CT LUMBAR SPINE W/O CONTRAST





CT THORACIC SPINE W/O CONTRAST, CT LUMBAR SPINE W/O CONTRAST





HISTORY: sepsis, back pain, ruling out device infection





TECHNIQUE: CT THORACIC SPINE W/O CONTRAST, CT LUMBAR SPINE W/O CONTRAST.


Coronal and sagittal reformats were obtained. CT was performed with one


or more of the following dose reduction techniques: Automated exposure


control, adjustment of the mA and/or kV according to the patient's size,


or use of the iterative reconstruction technique.








FINDINGS: 


Evaluation of the cord and discs is limited with CT. 


No evidence of compression fracture or dislocation.  


There is diffuse osteopenia degraded evaluation of the study.


Multilevel degenerative changes are noted. Postop changes of posterior


fusion are seen at L1-L2 on the right with pedicle screws and fixation


bar. Advanced degenerative changes are seen at L2-L3 with disc space


loss, endplate osteophytes and endplate sclerosis/lucencies, underlying


infection is not excluded. Correlate clinically. Stimulator leads are


seen in the lower thoracic spine.


Small left and moderate right pleural effusion with bilateral lower lobe


consolidation.


The aorta is normal in caliber. No acute findings in the visualized


abdomen.  The visualized lungs are clear. 








IMPRESSION: 


There is diffuse osteopenia degraded evaluation of the study.


Multilevel degenerative changes are noted. Postop changes of posterior


fusion are seen at L1-L2 on the right with pedicle screws and fixation


bar. Advanced degenerative changes are seen at L2-L3 with disc space


loss, endplate osteophytes and endplate sclerosis/lucencies, underlying


infection is not excluded. Correlate clinically. Stimulator leads are


seen in the lower thoracic spine.


Small left and moderate right pleural effusion with bilateral lower lobe


consolidation..




















DICTATED BY: TUCKER RAYO MD


DATE: 12/15/24 1245





REASON: AMS


ORDERING PHYSICIAN: BRAYAN GENAO NP


PROCEDURE: HEAD WO  -  CT HEAD/BRAIN W/O CONTRAST





CT HEAD/BRAIN W/O CONTRAST





INDICATION:   AMS





TECHNIQUE: CT HEAD/BRAIN W/O CONTRAST. CT was performed with one or more


of the following dose reduction techniques: Automated exposure control,


adjustment of the mA and/or kV according to the patient's size, or use


of the iterative reconstruction technique.


Comparison: None





FINDINGS:


Cerebral atrophy seen. Nonspecific periventricular and subcortical white


matters changes are noted likely representing small vessel ischemic


changes. No midline shift or herniation. No extra axial collection. No


acute intracranial bleed.


The visualized paranasal sinuses and mastoid air cells are normally


aerated.











IMPRESSION: 


Mild atrophy. No acute intracranial bleed is seen.


Scattered nonspecific white matter changes 























DICTATED BY: TUCKER RAYO MD


DATE: 12/14/24 1736





REASON: SOB


ORDERING PHYSICIAN: BRAYAN GENAO NP


PROCEDURE: CXR1VW  -  CHEST 1VW














INDICATION: SOB





TECHNIQUE: CHEST 1VW





COMPARISON: 12/12/2024








FINDINGS/IMPRESSION:


Bilateral airspace consolidation suggesting vascular congestion/edema


versus pneumonia. Small right effusion is seen.


Cardiomegaly is seen 


Mild degenerative changes of the spine.


The visualized upper abdomen appears unremarkable.


























DICTATED BY: TUCKER RAYO MD


DATE: 12/14/24 1819





ASSESSMENT: 


Hypokalemia


Acute on chronic renal failure 


Nephrotic syndrome


Metabolic encephalopathy


Sepsis without shock


Pneumonia


Diabetes mellitus type 2


Hypertension


Obesity BMI 31.7


PAD both lower extremities


Right pleural effusion with mild interstitial fibrosis


Possible infectious process involving L1-L2.





PLAN: 


Labs, diagnostic, radiologic exams reviewed and interpreted by myself and 

supervising physician. 


We have reviewed external records in detail


Discontinue Vancomycin due to worsening renal function, use alternative 

antibiotic.


Pending further recommendations from Cardiology, ID and neurosurgery


Require close monitoring of renal function and electrolytes 


Order CBC, CMP, and electrolytes in am 


Follow up blood cultures


BiPAP as necessary, for respiratory distress


IV pressors as needed


Monitor blood pressure adjust medication doses as needed


Avoid hypotensive episodes


May use Dilaudid 0.5 mg IV every 6 hours as needed for severe pain


Monitor blood sugars 


Strict intake, output, and daily weight should be monitored


Please renally adjust medications


Avoid nephrotoxic and nonsteroidal drugs


Avoid contrast if possible


Will continue to monitor renal function, anemia, electrolytes


Treatment plan discussed with patient


Questions were answered


We have discussed with the other team physicians in detail about the care plan


We will continue to monitor the patient closely


Total critical care time spent with patient, nursing staff, critical care team 

over 35 minutes


ATTESTATION BY PHYSICIAN


I have seen and examined the patient. I reviewed the documentation, medical 

decision making, and treatment plan as noted by the mid-level provider above. I 

agree with the findings and plan of care.











ELISEO FARRELL MD, [facility]             Dec 17, 2024 13:45

## 2024-12-17 NOTE — PN
CATALYST PROGRESS NOTE








Date of Service: Dec 17, 2024 


Time of Service: 15:45





SUBJECTIVE:





12/15 Pt seen at bedside, no acute events overnight.  She is currently not 

needing pressors or intubation however she is somnolent with rigors suggesting 

severe infection.  Will continue with current care, broad spectrum antibiotics 

and ICU care.  Appreciate nephrology recommendations.  





12/16 the patient has been seen and examined earlier this morning during my 

rounding, no acute events overnight, she is alert oriented x3, hemodynamically 

stable, saturating normal on air.  Results of MRI thoracic and lumbar spine 

reviewed, discussed with the patient, questions answered.  WBC today trending 

up.





12/17 the patient has been seen and examined during my rounding, downgraded to [facility] medical floor, remains alert oriented x3, BP mildly elevated, 150/81, rest of

the vital signs are unremarkable.  WBC trending down, today 15.9, discussed with

the patient.  Findings of MRI lumbar spine reviewed, discussed with the patient,

all questions answered.





REVIEW OF SYSTEMS


12 point ROS negative unless noted in HPI





PHYSICAL EXAM


GENERAL APPEARANCE:  The patient is awake, alert, and oriented, in no acute 

cardiopulmonary distress.


NEUROLOGICAL:  Cranial nerves II-XII grossly intact.  Motor is 5/5 in bilateral 

upper and lower extremities proximal to distal.  No sensory deficits.


HEENT:  Face is symmetric. Pupils are equal and reactive.  Extraocular movements

are intact.


NECK:  Supple.  No JVD. No thyromegaly. No submental, submandibular, pre-

/postauricular, occipital or supraclavicular lymphadenopathy.


CHEST:  Normal chest expansion. No Telemetry.


LUNGS:  Absence of any rales, rhonchi or any wheezing.


CARDIOVASCULAR:  Regular.  S1 and S2 normal.  No appreciable rubs, murmurs or 

gallops. 


ABDOMEN:  Soft, nontender, and nondistended.  There is no rebound, voluntary 

guarding, or rigidity.


:  Deferred. No Matos.


EXTREMITIES:  Non-edematous and not cyanotic.  No clubbing.  Good capillary 

refill.


SKIN:  No skin breakdown.





                             Vital Signs (last 8hr)








  Date Time  Temp Pulse Resp B/P (MAP) Pulse Ox O2 Delivery O2 Flow Rate FiO2


 


12/17/24 12:34  90 16  95   


 


12/17/24 12:19  93 16  95   


 


12/17/24 12:09  99 20 150/81 94   


 


12/17/24 12:04  99 20  98   


 


12/17/24 12:00     95 Room Air* 0 21 12/17/24 11:49  98 20 144/89 98   


 


12/17/24 11:42  99 16 138/90 98   


 


12/17/24 11:34  98 20  98   


 


12/17/24 11:19  147 13  98   


 


12/17/24 11:06  90 16     


 


12/17/24 11:04  144 12  97   


 


12/17/24 10:49  90 12  98   


 


12/17/24 10:47  93 12 171/88 94   


 


12/17/24 10:34  97 14  91   


 


12/17/24 10:19  98 12  86   


 


12/17/24 10:04  89 12  100   


 


12/17/24 10:00  89 19  99   


 


12/17/24 09:49  98 19  94   


 


12/17/24 09:48  96 11 154/85 93   


 


12/17/24 09:47  99 19 164/90 93   


 


12/17/24 09:45  98 19  99   


 


12/17/24 09:30  98 12  94   


 


12/17/24 09:15  99 12  99   


 


12/17/24 09:00  99 12  96   


 


12/17/24 08:47  100 12 163/91 92   


 


12/17/24 08:45  101 16  92   


 


12/17/24 08:30  89 15  86   


 


12/17/24 08:30  88 16   N/A Room Air  21 12/17/24 08:15  96 14  95   


 


12/17/24 08:02    162/91    


 


12/17/24 08:00     95 Room Air* 0 21 12/17/24 08:00     95 Room Air* 0 21 12/17/24 08:00  92 18  97   


 


12/17/24 07:47  92 18 150/92 94   











LABS:








                                   Laboratory:








Test


 12/17/24


12:14 12/17/24


04:12 12/16/24


04:22 Range/Units


 


 


Whole Blood Glucose 180 H     MG/DL


 


White Blood Count  15.9 H  4.8-10.8  K/uL


 


Red Blood Count


 


 3.39 L


 


 4.00-5.50


MIL/uL


 


Hemoglobin  10.5 L  12.0-16.0  g/dL


 


Hematocrit  32.9 L  36-48  %


 


Mean Corpuscular Volume  97.1   79-99  fL


 


Mean Corpuscular Hemoglobin  31.0   27.0-33.0  pg


 


Mean Corpuscular Hemoglobin


Concent 


 31.9 L


 


 32.0-36.0  g/dL





 


Red Cell Distribution Width  15.2   11.0-15.5  %


 


Platelet Count  244   130-400  K/uL


 


Mean Platelet Volume  11.6 H  7.5-10.5  fL


 


Immature Granulocyte % (Auto)  0.7   0-1  %


 


Neutrophils (%) (Auto)  81.9 H  40.0-77.0  %


 


Lymphocytes (%) (Auto)  8.8 L  21.0-51.0  %


 


Monocytes (%) (Auto)  7.1   3.0-13.0  %


 


Eosinophils (%) (Auto)  1.3   0.0-8.0  %


 


Basophils (%) (Auto)  0.2   0.0-5.0  %


 


Neutrophils # (Auto)  13.0 H  1.8-7.7  K/uL


 


Lymphocytes # (Auto)  1.4   1.0-4.8  K/uL


 


Monocytes # (Auto)  1.1 H  0.1-1.0  K/uL


 


Eosinophils # (Auto)  0.21   0.00-0.70  K/uL


 


Basophils # (Auto)  0.03   0.00-0.20  K/uL


 


Absolute Immature Granulocyte


(auto 


 0.11 


 


 0-1  K/uL





 


Nucleated Red Blood Cells  0.0   0.0-0.19  %


 


Sodium Level  145   136-145  mmol/L


 


Potassium Level  3.6   3.5-5.1  mmol/L


 


Chloride Level  111   101-111  mmol/L


 


Carbon Dioxide Level  24   21-32  mmol/L


 


Blood Urea Nitrogen  68 H  7-18  mg/dL


 


Creatinine  3.5 H  0.5-1.0  mg/dL


 


Glomerular Filtration Rate


Calc 


 14 


 


 >90  mL/min





 


Random Glucose  160 H    mg/dL


 


Total Calcium  8.0 L  8.5-10.1  mg/dL


 


Phosphorus Level  6.4 H  2.5-4.9  mg/dL


 


Iron Level  47 #L    mcg/dL


 


Total Iron Binding Capacity  191 L  250-450  mcg/dL


 


Percent Iron Saturation  24.6   22-44  %


 


Ferritin  441 H    ng/mL


 


Total Bilirubin  0.2   0.2-1.0  mg/dL


 


Aspartate Amino Transf


(AST/SGOT) 


 24 


 


 10-37  U/L





 


Alanine Aminotransferase


(ALT/SGPT) 


 23 


 


 12-78  U/L





 


Alkaline Phosphatase  74     U/L


 


Total Protein  5.2 L  6.0-8.3  g/dL


 


Albumin  1.6 L  3.5-5.0  g/dL


 


Procalcitonin  0.05   0.05-0.5  ng/mL


 


Segmented Neutrophils %   88 H 40-70  %


 


Band Neutrophils %   1  0-2  %


 


Lymphocytes % (Manual)   6 L 22-44  %


 


Monocytes % (Manual)   5  2-9  %


 


Differential Comment


 


 


 MANUAL


DIFFERENTIAL  





 


White Cell Morphology Comment   See comments   


 


Platelet Morphology Comment   ADEQUATE   


 


Red Blood Cell Morphology   See comments   


 


Red Cell Morphology Comment      


 


Magnesium Level


 


 


 1.80 


 1.80-2.40


mg/dL


 


Parathyroid Hormone (Intact)   104.8  15-65  pg/mL











                               Current Medications








 Medications


  (Trade)  Dose


 Ordered  Sig/Chaim


 Route


 PRN Reason  Start Time


 Stop Time Status Last Admin


Dose Admin


 


 Acetaminophen


  (TYLenol 325MG


 TAB)  650 mg  Q6H  PRN


 PO


 TEMPERATURE GREATER THAN 101.5  12/14/24 23:00


 12/15/24 17:42 DC 12/15/24 17:35


650 MG


 


 Acetaminophen


  (TYLenol 325MG


 TAB)  650 mg  Q6H  PRN


 PO


 MILD PAIN (1-3)  12/15/24 17:30


 1/14/25 17:29  12/16/24 15:16


650 MG


 


 Albuterol


  (DUOneb)  1 UDVIAL  [id number]


 IH


   12/15/24 00:00


 1/14/25 00:00  12/17/24 11:06


1 UDVIAL


 


 Albuterol Sulfate


  (Proventil


 0.083% 2.5mg/3ml)  2.5MG  Q4H  PRN


 IH


 SHORTNESS OF BREATH  12/14/24 23:00


 1/13/25 22:59   





 


 Azithromycin  250 ml @ 


 250 mls/hr  Q24H  STAT


 IVPB


   12/14/24 20:16


 12/14/24 21:15 DC 12/14/24 20:42


250 MLS/HR


 


 Calcium Gluconate


 1 gm/Sodium


 Chloride  100 ml @ 0


 mls/hr  PROTOCOL


 IV


   12/14/24 22:00


 1/13/25 21:59   





 


 Calcium Gluconate


 1 gm/Sodium


 Chloride  100 ml @ 0


 mls/hr  PROTOCOL


 IV


   12/15/24 05:00


 12/15/24 04:51 DC  





 


 Carvedilol


  (Coreg 6.25MG)  6.25 mg  BIDMEALS


 PO


   12/14/24 22:00


 1/13/25 21:59  12/17/24 08:02


6.25 MG


 


 Ceftriaxone Sodium


  (ROCEphine 1G


 INJ)  1 gm  ONCE  STAT


 IVPB


   12/14/24 20:16


 12/14/24 20:18 DC 12/14/24 20:42


1 GM


 


 Dextrose


  (D50w)  50 ml  AD  PRN


 IV


 HYPOGLYCEMIA PROTOCOL  12/14/24 21:30


 1/13/25 21:29   





 


 Doxycycline


 Hyclate  250 ml @ 


 125 mls/hr  Q12H


 IV


   12/15/24 12:00


 12/15/24 10:45 DC  





 


 Famotidine


  (Pepcid 20mg


 Vial)  20 mg  DAILY


 IV


   12/15/24 09:00


 1/14/25 08:59  12/17/24 08:02


20 MG


 


 Furosemide


  (LASix 20MG VIAL)  20 mg  Q8H


 IV


   12/14/24 21:30


 1/13/25 21:29  12/17/24 13:57


20 MG


 


 Gabapentin


  (NEURontin 100


 mg CAP)  100 mg  TID


 PO


   12/16/24 10:00


 1/15/25 09:59  12/17/24 13:57


100 MG


 


 Glucagon


  (Glucagon 1mg


 Kit)  1 mg  AD  PRN


 IM


 HYPOGLYCEMIA PROTOCOL  12/14/24 21:30


 1/13/25 21:29   





 


 Heparin Sodium


  (Porcine)


  (HEParin 5,000


 UNIT VIAL)  5,000 unit  BID


 SQ


   12/15/24 09:00


 1/14/25 08:59  12/17/24 08:10


5,000 UNIT


 


 Heparin Sodium


  (Porcine)


  (HEParin 5,000


 UNIT VIAL)  5,000 unit  BID


 SQ


   12/15/24 09:00


 12/14/24 23:12 DC  





 


 Hydralazine HCl


  (APRESOLine 20MG


 INJ)  10 mg  Q6H  PRN


 IV


 For:SBP above 160;DBP above 90  12/14/24 23:00


 1/13/25 22:59  12/17/24 04:40


10 MG


 


 Hydromorphone HCl


  (DiLAUDid 1MG


 INJ)  1 mg  Q4H  PRN


 IVP


 SEVERE PAIN (7-10)  12/16/24 10:00


 12/21/24 09:59  12/17/24 12:37


1 MG


 


 Insulin Human


 Regular


  (humuLIN R 100


 UNIT/ML 3ML)  INSULIN


 SLIDING


 SCAL...  ACHS


 SQ


   12/16/24 11:30


 1/15/25 11:29  12/16/24 17:13


2 UNIT


 


 Insulin Human


 Regular


  (humuLIN R 100


 UNIT/ML 3ML)  INSULIN


 SLIDING


 SCAL...  Q6H6


 SQ


   12/15/24 00:00


 12/16/24 10:40 DC 12/15/24 18:22


3 UNIT


 


 Levofloxacin/


 Dextrose  50 ml @ 50


 mls/hr  Q48H


 IVPB


   12/15/24 11:00


 12/25/24 10:59  12/17/24 09:53


50 MLS/HR


 


 Levofloxacin/


 Dextrose  100 ml @ 


 100 mls/hr  Q24H


 IV


   12/15/24 11:00


 12/15/24 10:53 DC  





 


 Lorazepam


  (AtiVAN)  1 mg  ONCE  STAT


 IVP


   12/14/24 17:39


 12/14/24 17:40 DC 12/14/24 17:46


1 MG


 


 Meropenem


  (Merrem)  500 mg  Q12H


 IVPB


   12/15/24 11:00


 12/25/24 10:59  12/17/24 09:53


500 MG


 


 Meropenem 500 mg/


 Sodium Chloride  100 ml @ 


 33.333 mls/


 hr  Q12H


 IV


   12/15/24 10:30


 12/15/24 10:48 DC  





 


 Methylprednisolone


 Sodium Succinate


  (Solu-medROL


 40MG)  40 mg  BID


 IVP


   12/15/24 09:00


 12/15/24 10:45 DC 12/15/24 09:36


40 MG


 


 Ondansetron HCl


  (zoFRAN 4MG INJ)  4 mg  Q6H  PRN


 IV


 NAUSEA/VOMITING  12/14/24 23:00


 1/13/25 22:59  12/17/24 13:57


4 MG


 


 Piperacillin Sod/


 Tazobactam Sod


  (Zosyn


 3.375gm+NS 50ml)  3.375 gm  Q12H


 IVPB


   12/14/24 21:00


 12/15/24 10:45 DC 12/15/24 09:36


3.375 GM


 


 Sodium Chloride  1,000 ml @ 


 500 mls/hr  Q2H STAT


 IV


   12/14/24 20:04


 12/14/24 22:03 DC 12/14/24 20:40


500 MLS/HR


 


 Sodium Chloride


  (NS 50ml)  50 ml  AD


 IV


   12/15/24 00:00


 12/14/24 21:15 DC  





 


 Vancomycin HCl  250 ml @ 


 125 mls/hr  Q48H


 IV


   12/17/24 11:30


 12/17/24 11:01 DC  





 


 Vancomycin HCl


  (Vancomycin


 Protocol)  1 each  AD


 IV


   12/15/24 10:30


 12/17/24 11:03 DC  














DIAGNOSTICS / RADIOLOGY:


[ ]


MR SPINAL CANAL, LUMBAR WO CON





HISTORY: Pain





COMPARISON: CT from 12/15/2024





TECHNIQUE: MRI of the lumbar spine was performed utilizing multiple


pulse sequences in axial and sagittal plane. Patient was not given


contrast through intravenous route. 





FINDINGS: Endplate degenerative changes with disc space narrowing are


seen predominantly involving the L1-L2 level. Orthopedic fixation plates


and screws are seen traversing the T12 and L1 with paramagnetic


susceptibility artifacts limiting evaluation. There is disc noted at the


lumbosacral junction and for numbering purposes only, this level will be


designated as S1-S2. No abnormal signal intensity is seen of the


visualized bony structure.  No loss of vertebral height is seen.  There


is straightening of normal lumbar curvature which may be related to


muscle spasm or positioning. Degenerative disc signals are present at


all lumbar spine levels. Visualized distal conus is unremarkable.





At the L2-3 level,  there is spondylotic with central disc


protrusion/herniation with bilateral ligamentum flavum hypertrophy


causing anterior thecal sac compression with bilateral lateral recess


stenosis and bilateral neural foraminal stenosis. The thecal sac


measures approximately 7 mm in its anterior posterior dimension.





At the L3-4 level,  there is spondylotic disc with bilateral ligamentum


flavum hypertrophy causing anterior thecal sac compression with


bilateral lateral recess stenosis and bilateral neural foraminal


stenosis. The thecal sac measures approximately 6.2 mm in its anterior


posterior dimension.





At the L4-5 level,  there is spondylotic disc with bilateral ligamentum


flavum hypertrophy causing anterior thecal sac compression with


bilateral lateral recess stenosis and bilateral neural foraminal


stenosis. The thecal sac measures approximately 8.4 mm in its anterior


posterior dimension.





At the L5-S1 level,  there is spondylotic disc with bilateral ligamentum


flavum hypertrophy and facet hypertrophy causing anterior thecal sac


compression with bilateral lateral recess stenosis and bilateral neural


foraminal stenosis. The thecal sac measures approximately 8.2 mm in its


anterior posterior dimension.





IMPRESSION:


1. DJD with lumbar spine spondylosis as described above. Evaluation at


T12 and L1 are limited due to postop artifacts.








ASSESSMENT:





Metabolic encephalopathy


Sepsis without shock


Pneumonia


Chronic renal failure. 


Hypokalemia, POA


Diabetes mellitus type 2 last A1C 9.7


Hypertension


Obesity BMI 31.7


PAD both lower extremities


Right pleural effusion with mild interstitial fibrosis


Possible infectious process involving L1-L2.





PLAN:





- patient remains admitted to the medical floor


- MRI lumbar spine showing degenerative changes.


- discussed with the Neurosurgery, no intervention required, can follow up as an

outpatient


- follow ID input and recommendations


- continue closely monitoring patient's blood pressure, add hydralazine 25 mg 

p.o. b.i.d.


- continue to follow Nephrology input and recommendation


-a.m. labs





Plan of action discussed with the patient, all questions answered, agreed and 

understood the information provided.











BENJAMÍN MEJIA MD           Dec 17, 2024 15:48

## 2024-12-17 NOTE — CONS
INFECTIOUS DISEASE CONSULTATION NOTE



DATE OF SERVICE:  12/16/2024



REFERRING PHYSICIAN:  Dr. Jose Manuel Lopez.



REASON FOR CONSULTATION:  Antibiotic management.



HISTORY OF PRESENT ILLNESS:  A 69-year-old female with history of CHF, diabetes 

mellitus, dyslipidemia, who presented to the hospital with altered mental 

status.  The patient also found with some shortness of breath.  The patient 

admitted to ICU for further care.  Procalcitonin is negative.  WBC elevated at 

18,000.  The patient was started on vancomycin and levofloxacin  The patient was

discharged from this facility two days ago.  During the previous admission, she 

was found to have pneumonia and cellulitis and acute on chronic renal failure.



PAST MEDICAL HISTORY:

   * Diabetes mellitus.

   * Dyslipidemia.

   * Chronic kidney disease.

   * Peripheral vascular disease.

   * CHF.



PAST SURGICAL HISTORY:

   * Femoropopliteal bypass.

   * Back surgery.

   * Nerve stimulator implant.



ALLERGIES:

   * DOXYCYCLINE.

   * IODINE.



CURRENT MEDICATIONS:  Reviewed.



SOCIAL HISTORY:  No alcohol, tobacco or illicit drug use.



FAMILY HISTORY:  Positive for diabetes mellitus.



REVIEW OF SYSTEMS:  Greater than 10 systems were reviewed, negative except as 

documented above.



PHYSICAL EXAMINATION:

GENERAL:  Elderly female, awake, slightly confused.

VITAL SIGNS:  Temperature 98.4, respiratory 23, blood pressure 158/85.

EYES:  No icterus.  Pupils are equal and reactive.

HENT:  No oral thrush seen.  Moist oral mucosa.

NECK:  Supple.  No JVD or thyromegaly.

LUNGS:  Good air entry.  No rales, no rhonchi.

CARDIOVASCULAR:  S1, S2 regular.  No murmur heard.

ABDOMEN:  Full.  Soft.  Nontender.  Bowel sound is present.

CENTRAL NERVOUS SYSTEM:  Awake, alert.  No focal deficits.

SKIN:  No rashes.  No itchiness.

LYMPHATIC:  No peripheral lymphadenopathy.

BACK:  No deformity.  No pressure ulcer.

MUSCULOSKELETAL:  No joint swelling, erythema or tenderness.



LABORATORY DATA:  Procalcitonin negative.  Sodium 147, potassium 3.4, BUN 63, 

creatinine 3.8.  WBC 18.5, hemoglobin 10.2, platelet 254.  Urine toxicology 

negative.  Blood culture, no growth for one day.



RADIOLOGY:  CT of the thoracic spine unremarkable.



ASSESSMENT:  A 69-year-old female presenting with altered mental status.



CURRENT PROBLEMS:  Include:  ____

   * Encephalopathy which is multifactorial.

   * Heart failure.

   * Acute on chronic renal failure.

   * Leukocytosis.

   * Anemia.

   * Diabetes mellitus.

   * Hypertension.



PLAN:

   * Obtain MRI of the thoracic and lumbar spine.

   * Continue critical care support.

   * Follow up cultures.

   * Continue pain management.

   * Continue antiemetic.

   * Avoid nephrotoxic medication.

   * Monitor electrolytes and correct as needed.



Thank you for allowing me to participate in the care of this patient.







DD: 12/16/2024 06:02 PM

DT: 12/16/2024 08:16 PM

TID: [id number] RECEIPT: [id number]

## 2024-12-17 NOTE — NUR
RECEIVED PATIENT FROM 2ND FLOOR. PATIENT IS ALERT, ORIENTED IN PERSON, TIME AND PLACE. NO 
SIGNS OF RESPIRATORY DISTRESS NOTED AT THIS TIME. PIV PATENT, SALINE LOCK. BOWEL SOUNDS 
PRESENT IN ALL FOUR ABDOMINAL QUADRANTS. VIDES CATHETER PATENT, DRAINING TO GRAVITY.

## 2024-12-17 NOTE — NUR
BLOOD GLUCOSE 211. COVERED WITH 3 UNITS OF HUMULIN R SUBCUTANEOUS FOLLOWING INSULIN SCALE 
ION PATIENTS E-MAR.

## 2024-12-18 VITALS — RESPIRATION RATE: 19 BRPM | HEART RATE: 79 BPM

## 2024-12-18 VITALS
HEART RATE: 91 BPM | SYSTOLIC BLOOD PRESSURE: 159 MMHG | RESPIRATION RATE: 18 BRPM | DIASTOLIC BLOOD PRESSURE: 83 MMHG | TEMPERATURE: 97.5 F

## 2024-12-18 VITALS
SYSTOLIC BLOOD PRESSURE: 193 MMHG | RESPIRATION RATE: 18 BRPM | HEART RATE: 91 BPM | TEMPERATURE: 98.8 F | DIASTOLIC BLOOD PRESSURE: 107 MMHG

## 2024-12-18 VITALS
HEART RATE: 89 BPM | TEMPERATURE: 98.4 F | DIASTOLIC BLOOD PRESSURE: 87 MMHG | SYSTOLIC BLOOD PRESSURE: 173 MMHG | RESPIRATION RATE: 19 BRPM

## 2024-12-18 VITALS
SYSTOLIC BLOOD PRESSURE: 163 MMHG | HEART RATE: 80 BPM | RESPIRATION RATE: 20 BRPM | DIASTOLIC BLOOD PRESSURE: 85 MMHG | TEMPERATURE: 98.5 F

## 2024-12-18 VITALS — RESPIRATION RATE: 18 BRPM | HEART RATE: 80 BPM

## 2024-12-18 VITALS — OXYGEN SATURATION: 98 % | HEART RATE: 80 BPM | RESPIRATION RATE: 18 BRPM

## 2024-12-18 VITALS — OXYGEN SATURATION: 96 % | RESPIRATION RATE: 18 BRPM | HEART RATE: 82 BPM

## 2024-12-18 VITALS
DIASTOLIC BLOOD PRESSURE: 85 MMHG | HEART RATE: 86 BPM | RESPIRATION RATE: 17 BRPM | TEMPERATURE: 97.7 F | SYSTOLIC BLOOD PRESSURE: 160 MMHG

## 2024-12-18 VITALS — HEART RATE: 78 BPM | RESPIRATION RATE: 18 BRPM | OXYGEN SATURATION: 97 %

## 2024-12-18 VITALS — SYSTOLIC BLOOD PRESSURE: 139 MMHG | HEART RATE: 90 BPM | DIASTOLIC BLOOD PRESSURE: 73 MMHG

## 2024-12-18 VITALS — OXYGEN SATURATION: 95 %

## 2024-12-18 VITALS — OXYGEN SATURATION: 97 %

## 2024-12-18 VITALS — HEART RATE: 82 BPM | RESPIRATION RATE: 16 BRPM

## 2024-12-18 VITALS — RESPIRATION RATE: 16 BRPM | HEART RATE: 78 BPM

## 2024-12-18 LAB
ALBUMIN SERPL-MCNC: 1.5 G/DL (ref 3.5–5)
ALT SERPL-CCNC: 31 U/L (ref 12–78)
AST SERPL-CCNC: 31 U/L (ref 10–37)
BASOPHILS # BLD AUTO: 0.03 K/UL (ref 0–0.2)
BASOPHILS NFR BLD AUTO: 0.2 % (ref 0–5)
BILIRUB SERPL-MCNC: 0.3 MG/DL (ref 0.2–1)
BUN SERPL-MCNC: 66 MG/DL (ref 7–18)
CHLORIDE SERPL-SCNC: 111 MMOL/L (ref 101–111)
CO2 SERPL-SCNC: 27 MMOL/L (ref 21–32)
CREAT SERPL-MCNC: 3 MG/DL (ref 0.5–1)
EOSINOPHIL # BLD AUTO: 0.7 K/UL (ref 0–0.7)
EOSINOPHIL NFR BLD AUTO: 4.7 % (ref 0–8)
ERYTHROCYTE [DISTWIDTH] IN BLOOD BY AUTOMATED COUNT: 14.9 % (ref 11–15.5)
GFR SERPL CREATININE-BSD FRML MDRD: 17 ML/MIN (ref 90–?)
GLUCOSE SERPL-MCNC: 149 MG/DL (ref 70–105)
HCT VFR BLD AUTO: 33.5 % (ref 36–48)
IMM GRANULOCYTES # BLD: 0.1 K/UL (ref 0–1)
LYMPHOCYTES # SPEC AUTO: 1.9 K/UL (ref 1–4.8)
LYMPHOCYTES NFR SPEC AUTO: 12.3 % (ref 21–51)
MAGNESIUM SERPL-MCNC: 1.7 MG/DL (ref 1.8–2.4)
MCH RBC QN AUTO: 30.8 PG (ref 27–33)
MCHC RBC AUTO-ENTMCNC: 32.2 G/DL (ref 32–36)
MCV RBC AUTO: 95.4 FL (ref 79–99)
MONOCYTES # BLD AUTO: 1.3 K/UL (ref 0.1–1)
MONOCYTES NFR BLD AUTO: 8.5 % (ref 3–13)
NEUTROPHILS # BLD AUTO: 11.1 K/UL (ref 1.8–7.7)
NEUTROPHILS NFR BLD AUTO: 73.6 % (ref 40–77)
NRBC BLD MANUAL-RTO: 0 % (ref 0–0.19)
PHOSPHATE SERPL-MCNC: 4.9 MG/DL (ref 2.5–4.9)
PLATELET # BLD AUTO: 229 K/UL (ref 130–400)
POTASSIUM SERPL-SCNC: 3.3 MMOL/L (ref 3.5–5.1)
PROT SERPL-MCNC: 4.9 G/DL (ref 6–8.3)
RBC # BLD AUTO: 3.51 MIL/UL (ref 4–5.5)
SODIUM SERPL-SCNC: 146 MMOL/L (ref 136–145)
WBC # BLD AUTO: 15 K/UL (ref 4.8–10.8)

## 2024-12-18 RX ADMIN — POTASSIUM CHLORIDE ONE MEQ: 1500 TABLET, EXTENDED RELEASE ORAL at 09:24

## 2024-12-18 RX ADMIN — CHLORTHALIDONE SCH EACH: 50 TABLET ORAL at 09:00

## 2024-12-18 RX ADMIN — ASPIRIN SCH MG: 81 TABLET, CHEWABLE ORAL at 09:24

## 2024-12-18 RX ADMIN — MAGNESIUM SULFATE IN WATER SCH MLS/HR: 40 INJECTION, SOLUTION INTRAVENOUS at 09:25

## 2024-12-18 RX ADMIN — ASCORBIC ACID, FOLIC ACID, NIACIN, THIAMINE, RIBOFLAVIN, PYRIDOXINE, CYANOCOBALAMIN, PANTOTHENIC ACID, BIOTIN SCH CAP: 100; 150; 6; 1; 20; 5; 10; 1.7; 1.5 CAPSULE, LIQUID FILLED ORAL at 09:24

## 2024-12-18 NOTE — PN
CATAL[facility] PROGRESS NOTE








Date of Service: Dec 18, 2024 


Time of Service: 12:27





SUBJECTIVE:





12/15 Pt seen at bedside, no acute events overnight.  She is currently not 

needing pressors or intubation however she is somnolent with rigors suggesting 

severe infection.  Will continue with current care, broad spectrum antibiotics 

and ICU care.  Appreciate nephrology recommendations.  





12/16 the patient has been seen and examined earlier this morning during my 

rounding, no acute events overnight, she is alert oriented x3, hemodynamically 

stable, saturating normal on air.  Results of MRI thoracic and lumbar spine 

reviewed, discussed with the patient, questions answered.  WBC today trending 

up.





12/17 the patient has been seen and examined during my rounding, downgraded to [facility] medical floor, remains alert oriented x3, BP mildly elevated, 150/81, rest of

the vital signs are unremarkable.  WBC trending down, today 15.9, discussed with

the patient.  Findings of MRI lumbar spine reviewed, discussed with the patient,

all questions answered.





12/18 the patient has been seen and examined during my rounding, she remains 

comfortably in bed, hemodynamically stable, afebrile, WBC slowly trending down. 

Getting good pain control with current medical management, no family members at 

bedside during my visit.





REVIEW OF SYSTEMS


12 point ROS negative unless noted in HPI





PHYSICAL EXAM


GENERAL APPEARANCE:  The patient is awake, alert, and oriented, in no acute card

iopulmonary distress.


NEUROLOGICAL:  Cranial nerves II-XII grossly intact.  Motor is 5/5 in bilateral 

upper and lower extremities proximal to distal.  No sensory deficits.


HEENT:  Face is symmetric. Pupils are equal and reactive.  Extraocular movements

are intact.


NECK:  Supple.  No JVD. No thyromegaly. No submental, submandibular, pre-

/postauricular, occipital or supraclavicular lymphadenopathy.


CHEST:  Normal chest expansion. No Telemetry.


LUNGS:  Absence of any rales, rhonchi or any wheezing.


CARDIOVASCULAR:  Regular.  S1 and S2 normal.  No appreciable rubs, murmurs or 

gallops. 


ABDOMEN:  Soft, nontender, and nondistended.  There is no rebound, voluntary 

guarding, or rigidity.


:  Deferred. No Matos.


EXTREMITIES:  Non-edematous and not cyanotic.  No clubbing.  Good capillary 

refill.


SKIN:  No skin breakdown.





                             Vital Signs (last 8hr)








  Date Time  Temp Pulse Resp B/P (MAP) Pulse Ox O2 Delivery O2 Flow Rate FiO2


 


12/18/24 11:40  78 18   N/A Room Air  21 12/18/24 11:39  78 16     


 


12/18/24 08:00 98.8 91 18 193/107 95 Room Air  21 12/18/24 07:04  82 18   N/A Room Air  21 12/18/24 07:03  82 16     











LABS:








                                   Laboratory:








Test


 12/18/24


11:26 12/18/24


05:20 12/17/24


04:12 Range/Units


 


 


Whole Blood Glucose 160 H     MG/DL


 


Bedside Glucose Comment Notified Nurse     


 


White Blood Count  15.0 H  4.8-10.8  K/uL


 


Red Blood Count


 


 3.51 L


 


 4.00-5.50


MIL/uL


 


Hemoglobin  10.8 L  12.0-16.0  g/dL


 


Hematocrit  33.5 L  36-48  %


 


Mean Corpuscular Volume  95.4   79-99  fL


 


Mean Corpuscular Hemoglobin  30.8   27.0-33.0  pg


 


Mean Corpuscular Hemoglobin


Concent 


 32.2 


 


 32.0-36.0  g/dL





 


Red Cell Distribution Width  14.9   11.0-15.5  %


 


Platelet Count  229   130-400  K/uL


 


Mean Platelet Volume  11.7 H  7.5-10.5  fL


 


Immature Granulocyte % (Auto)  0.7   0-1  %


 


Neutrophils (%) (Auto)  73.6   40.0-77.0  %


 


Lymphocytes (%) (Auto)  12.3 L  21.0-51.0  %


 


Monocytes (%) (Auto)  8.5   3.0-13.0  %


 


Eosinophils (%) (Auto)  4.7   0.0-8.0  %


 


Basophils (%) (Auto)  0.2   0.0-5.0  %


 


Neutrophils # (Auto)  11.1 H  1.8-7.7  K/uL


 


Lymphocytes # (Auto)  1.9   1.0-4.8  K/uL


 


Monocytes # (Auto)  1.3 H  0.1-1.0  K/uL


 


Eosinophils # (Auto)  0.70   0.00-0.70  K/uL


 


Basophils # (Auto)  0.03   0.00-0.20  K/uL


 


Absolute Immature Granulocyte


(auto 


 0.10 


 


 0-1  K/uL





 


Nucleated Red Blood Cells  0.0   0.0-0.19  %


 


Sodium Level  146 H  136-145  mmol/L


 


Potassium Level  3.3 L  3.5-5.1  mmol/L


 


Chloride Level  111   101-111  mmol/L


 


Carbon Dioxide Level  27   21-32  mmol/L


 


Blood Urea Nitrogen  66 H  7-18  mg/dL


 


Creatinine  3.0 H  0.5-1.0  mg/dL


 


Glomerular Filtration Rate


Calc 


 17 


 


 >90  mL/min





 


Random Glucose  149 H    mg/dL


 


Total Calcium  7.9 L  8.5-10.1  mg/dL


 


Phosphorus Level  4.9   2.5-4.9  mg/dL


 


Magnesium Level


 


 1.70 L


 


 1.80-2.40


mg/dL


 


Total Bilirubin  0.3   0.2-1.0  mg/dL


 


Aspartate Amino Transf


(AST/SGOT) 


 31 


 


 10-37  U/L





 


Alanine Aminotransferase


(ALT/SGPT) 


 31 


 


 12-78  U/L





 


Alkaline Phosphatase  94     U/L


 


Total Protein  4.9 L  6.0-8.3  g/dL


 


Albumin  1.5 L  3.5-5.0  g/dL


 


Iron Level   47 #L   mcg/dL


 


Total Iron Binding Capacity   191 L 250-450  mcg/dL


 


Percent Iron Saturation   24.6  22-44  %


 


Ferritin   441 H   ng/mL


 


Procalcitonin   0.05  0.05-0.5  ng/mL











                               Current Medications








 Medications


  (Trade)  Dose


 Ordered  Sig/Chaim


 Route


 PRN Reason  Start Time


 Stop Time Status Last Admin


Dose Admin


 


 Acetaminophen


  (TYLenol 325MG


 TAB)  650 mg  Q6H  PRN


 PO


 TEMPERATURE GREATER THAN 101.5  12/14/24 23:00


 12/15/24 17:42 DC 12/15/24 17:35


650 MG


 


 Acetaminophen


  (TYLenol 325MG


 TAB)  650 mg  Q6H  PRN


 PO


 MILD PAIN (1-3)  12/15/24 17:30


 1/14/25 17:29  12/17/24 17:14


650 MG


 


 Albuterol


  (DUOneb)  1 UDVIAL  [id number]


 IH


   12/15/24 00:00


 1/14/25 00:00  12/18/24 11:37


1 UDVIAL


 


 Albuterol Sulfate


  (Proventil


 0.083% 2.5mg/3ml)  2.5MG  Q4H  PRN


 IH


 SHORTNESS OF BREATH  12/14/24 23:00


 1/13/25 22:59   





 


 Aspirin


  (Aspirin 81mg


 Chew Tab)  81 mg  DAILY


 PO


   12/18/24 09:00


 1/17/25 08:59  12/18/24 09:24


81 MG


 


 Atorvastatin


 Calcium


  (LIPItor 40MG)  40 mg  HS


 PO


   12/17/24 21:00


 1/16/25 20:59  12/17/24 20:46


40 MG


 


 Azithromycin  250 ml @ 


 250 mls/hr  Q24H  STAT


 IVPB


   12/14/24 20:16


 12/14/24 21:15 DC 12/14/24 20:42


250 MLS/HR


 


 Calcium Gluconate


 1 gm/Sodium


 Chloride  100 ml @ 0


 mls/hr  PROTOCOL


 IV


   12/14/24 22:00


 1/13/25 21:59   





 


 Calcium Gluconate


 1 gm/Sodium


 Chloride  100 ml @ 0


 mls/hr  PROTOCOL


 IV


   12/15/24 05:00


 12/15/24 04:51 DC  





 


 Carvedilol


  (Coreg 6.25MG)  6.25 mg  BIDMEALS


 PO


   12/14/24 22:00


 1/13/25 21:59  12/17/24 17:17


6.25 MG


 


 Ceftriaxone Sodium


  (ROCEphine 1G


 INJ)  1 gm  ONCE  STAT


 IVPB


   12/14/24 20:16


 12/14/24 20:18 DC 12/14/24 20:42


1 GM


 


 Clopidogrel


 Bisulfate


  (plaVIX 75MG)  75 mg  DAILY


 PO


   12/18/24 09:00


 1/17/25 08:59  12/18/24 09:23


75 MG


 


 Dextrose


  (D50w)  50 ml  AD  PRN


 IV


 HYPOGLYCEMIA PROTOCOL  12/14/24 21:30


 1/13/25 21:29   





 


 Doxycycline


 Hyclate  250 ml @ 


 125 mls/hr  Q12H


 IV


   12/15/24 12:00


 12/15/24 10:45 DC  





 


 Famotidine


  (Pepcid 20mg


 Vial)  20 mg  DAILY


 IV


   12/15/24 09:00


 1/14/25 08:59  12/18/24 09:24


20 MG


 


 Fluticasone


 Propionate


  (FLOnase 50 mcg/


 spray 16g bottle)  2 SPRAYS


 EACH


 NOST...  DAILY


 EN


   12/18/24 09:00


 1/17/25 08:59   





 


 Furosemide


  (LASix 20MG VIAL)  20 mg  Q8H


 IV


   12/14/24 21:30


 1/13/25 21:29  12/18/24 05:34


20 MG


 


 Gabapentin


  (NEURontin 100


 mg CAP)  100 mg  TID


 PO


   12/16/24 10:00


 1/15/25 09:59  12/18/24 09:24


100 MG


 


 Glucagon


  (Glucagon 1mg


 Kit)  1 mg  AD  PRN


 IM


 HYPOGLYCEMIA PROTOCOL  12/14/24 21:30


 1/13/25 21:29   





 


 Heparin Sodium


  (Porcine)


  (HEParin 5,000


 UNIT VIAL)  5,000 unit  BID


 SQ


   12/15/24 09:00


 1/14/25 08:59  12/18/24 09:43


5,000 UNIT


 


 Heparin Sodium


  (Porcine)


  (HEParin 5,000


 UNIT VIAL)  5,000 unit  BID


 SQ


   12/15/24 09:00


 12/14/24 23:12 DC  





 


 Home Med


  (Home Medication)  Cholecalciferol


 (Vitamin D3) 1


 CAP  DAILY


 PO


   12/18/24 09:00


 1/17/25 08:59   





 


 Hydralazine HCl


  (APRESOLine 20MG


 INJ)  10 mg  Q6H  PRN


 IV


 For:SBP above 160;DBP above 90  12/14/24 23:00


 1/13/25 22:59  12/17/24 04:40


10 MG


 


 Hydralazine HCl


  ([id number]


 TAB)  25 mg  BID


 PO


   12/17/24 21:00


 1/16/25 20:59  12/18/24 09:24


25 MG


 


 Hydromorphone HCl


  (DiLAUDid 1MG


 INJ)  1 mg  Q4H  PRN


 IVP


 SEVERE PAIN (7-10)  12/16/24 10:00


 12/21/24 09:59  12/18/24 09:32


1 MG


 


 Insulin Human


 Regular


  (humuLIN R 100


 UNIT/ML 3ML)  INSULIN


 SLIDING


 SCAL...  ACHS


 SQ


   12/16/24 11:30


 1/15/25 11:29  12/17/24 17:21


3 UNIT


 


 Insulin Human


 Regular


  (humuLIN R 100


 UNIT/ML 3ML)  INSULIN


 SLIDING


 SCAL...  Q6H6


 SQ


   12/15/24 00:00


 12/16/24 10:40 DC 12/15/24 18:22


3 UNIT


 


 Levofloxacin/


 Dextrose  50 ml @ 50


 mls/hr  Q48H


 IVPB


   12/15/24 11:00


 12/25/24 10:59  12/17/24 09:53


50 MLS/HR


 


 Levofloxacin/


 Dextrose  100 ml @ 


 100 mls/hr  Q24H


 IV


   12/15/24 11:00


 12/15/24 10:53 DC  





 


 Lorazepam


  (AtiVAN)  1 mg  ONCE  STAT


 IVP


   12/14/24 17:39


 12/14/24 17:40 DC 12/14/24 17:46


1 MG


 


 Magnesium Sulfate  50 ml @ 0


 mls/hr  PROTOCOL


 IV


   12/18/24 09:00


 1/17/25 08:59  12/18/24 09:25


25 MLS/HR


 


 Meropenem


  (Merrem)  500 mg  Q12H


 IVPB


   12/15/24 11:00


 12/25/24 10:59  12/18/24 11:53


500 MG


 


 Meropenem 500 mg/


 Sodium Chloride  100 ml @ 


 33.333 mls/


 hr  Q12H


 IV


   12/15/24 10:30


 12/15/24 10:48 DC  





 


 Methylprednisolone


 Sodium Succinate


  (Solu-medROL


 40MG)  40 mg  BID


 IVP


   12/15/24 09:00


 12/15/24 10:45 DC 12/15/24 09:36


40 MG


 


 Montelukast Sodium


  (SinguLAIR)  10 mg  HS


 PO


   12/17/24 21:00


 1/16/25 20:59  12/17/24 20:46


10 MG


 


 Ondansetron HCl


  (zoFRAN 4MG INJ)  4 mg  Q6H  PRN


 IV


 NAUSEA/VOMITING  12/14/24 23:00


 1/13/25 22:59  12/17/24 13:57


4 MG


 


 Pantoprazole


 Sodium


  (PROTonix 40MG


 TAB)  40 mg  DAILY


 PO


   12/18/24 09:00


 1/17/25 08:59  12/18/24 09:24


40 MG


 


 Piperacillin Sod/


 Tazobactam Sod


  (Zosyn


 3.375gm+NS 50ml)  3.375 gm  Q12H


 IVPB


   12/14/24 21:00


 12/15/24 10:45 DC 12/15/24 09:36


3.375 GM


 


 Sodium Chloride  1,000 ml @ 


 500 mls/hr  Q2H STAT


 IV


   12/14/24 20:04


 12/14/24 22:03 DC 12/14/24 20:40


500 MLS/HR


 


 Sodium Chloride


  (NS 50ml)  50 ml  AD


 IV


   12/15/24 00:00


 12/14/24 21:15 DC  





 


 Vancomycin HCl  250 ml @ 


 125 mls/hr  Q48H


 IV


   12/17/24 11:30


 12/17/24 11:01 DC  





 


 Vancomycin HCl


  (Vancomycin


 Protocol)  1 each  AD


 IV


   12/15/24 10:30


 12/17/24 11:03 DC  





 


 Vitamin B Complex/


 Vit C/Folic Acid


  (Nephrovite


 Tablet)  1 cap  DAILY


 PO


   12/18/24 09:00


 1/17/25 08:59  12/18/24 09:24


1 CAP











DIAGNOSTICS / RADIOLOGY:


[ ]





ASSESSMENT:





Metabolic encephalopathy


Sepsis without shock


Pneumonia


Chronic renal failure. 


Hypokalemia, POA


Diabetes mellitus type 2 last A1C 9.7


Hypertension


Obesity BMI 31.7


PAD both lower extremities


Right pleural effusion with mild interstitial fibrosis


Possible infectious process involving L1-L2.





PLAN:





- patient remains admitted to the medical floor


- MRI lumbar spine showing degenerative changes.  Discussed with the patient


- discussed with the Neurosurgery, no intervention required, can follow up as an

outpatient


- continue to follow infectious disease input recommendation


-continue current IV antibiotics, continue to trend WBC in a.m.


- continue closely monitoring patient's blood pressure, add hydralazine 25 mg 

p.o. b.i.d.


- continue to follow Nephrology input and recommendation


-a.m. labs





Plan of action discussed with the patient, all questions answered, agreed and 

understood the information provided.











BENJAMÍN MEJIA MD           Dec 18, 2024 12:28

## 2024-12-18 NOTE — CONS
DOS late entry 








HPI:


68 yo  female with pmhx of htn, dmi, padx with mutiple interventions 

done in the past, present to the ed bilater leg weakness she was found to have 

possible abcess to the spine  


we have been consult for concern for AMS


pt denies cp or sob


on 2024 pt underwent cadx workup in office which was negative





pmhx 


as per pt





surgical history 


right fem-pop bypass





social history 


no tobacco us or alcohol use





ROS:


General:  No malaise or fever.


Neurological:  No fainting episodes or seizures.


HEENT:  No nasal congestion or nasal secretion.


Cardiac:  No chest pain or palpitations.


Gastrointestinal:  No vomiting or diarrhea.


Skin:  No rashes or lesions.


Hematological:  No bruises or bleeding.


Musculoskeletal:  No joint pains or arthralgias right lower ext pain


Psychiatric:  No depression or panic attacks.


Patient History:  


Carcinomas


  FATHER, , Age: 85 (lungs )


Cardiovascular disease


  MOTHER, , Age: 92


Diabetes mellitus


  MOTHER, , Age: 92


  FATHER, , Age: 85


  BROTHER


  SISTER


Hypertension


  MOTHER, , Age: 92


  SISTER





No Family History of:


  Alzheimer's disease


  Asthma


  Chronic obstructive pulmonary disease


  Completed stroke


  Immunocompromised state


  Sudden death


  Unknown


Vitals/Labs








PHYSICAL EXAM


GENERAL APPEARANCE:  The patient is awake, alert, and oriented, in no acute 

cardiopulmonary distress.


NEUROLOGICAL:  Cranial nerves II-XII grossly intact.  Motor is 5/5 in bilateral 

upper and lower extremities proximal to distal.  No sensory deficits.


HEENT:  Face is symmetric. Pupils are equal and reactive.  Extraocular movements

are intact.


NECK:  Supple.  No JVD. No thyromegaly. No submental, submandibular, pre-

/postauricular, occipital or supraclavicular lymphadenopathy.


CHEST:  Normal chest expansion. No Telemetry.


LUNGS:  Absence of any rales, rhonchi or any wheezing.


CARDIOVASCULAR:  Regular.  S1 and S2 normal.  No appreciable rubs, murmurs or 

gallops. 


ABDOMEN:  Soft, nontender, and nondistended.  There is no rebound, voluntary 

guarding, or rigidity.


:  Deferred. No Matos.


EXTREMITIES:  Non-edematous and not cyanotic.  No clubbing.  Good capillary 

refill.


SKIN:  No skin breakdown











Vital Signs








  Date Time  Temp Pulse Resp B/P (MAP) Pulse Ox O2 Delivery O2 Flow Rate FiO2


 


24 14:08  90  139/73    


 


24 11:40   18   N/A Room Air  21


 


24 08:00 98.8    95   


 


24 20:46       0 





Laboratory Tests


24 05:20








Allergies:  


Coded Allergies:  


     doxycycline (Unverified  Allergy, Unknown, SWELLING, 12/15/24)


     iodine (Unverified  Allergy, Unknown, Hives, itching, 12/15/24)


Medications





Current Medications


Lorazepam 2 mg STK-MED ONCE .ROUTE;  Start 24 at 17:38;  Stop 24 at 

17:39;  Status DC


Lorazepam 1 mg ONCE  STAT IVP Last administered on 24at 17:46;  Start 

24 at 17:39;  Stop 24 at 17:40;  Status DC


Sodium Chloride 1,000 ml @  500 mls/hr Q2H STAT IV Last administered on 

24at 20:40;  Start 24 at 20:04;  Stop 24 at 22:03;  Status DC


Ceftriaxone Sodium 1 gm ONCE  STAT IVPB Last administered on 24at 20:42;  

Start 24 at 20:16;  Stop 24 at 20:18;  Status DC


Azithromycin 250 ml @  250 mls/hr Q24H  STAT IVPB Last administered on 

24at 20:42;  Start 24 at 20:16;  Stop 24 at 21:15;  Status DC


Piperacillin Sod/ Tazobactam Sod 3.375 gm Q12H IVPB Last administered on 

12/15/24at 09:36;  Start 24 at 21:00;  Stop 12/15/24 at 10:45;  Status DC


Sodium Chloride 50 ml AD IV;  Start 12/15/24 at 00:00;  Stop 24 at 21:15; 

Status DC


Sodium Chloride 50 ml 2042  ONCE IV;  Start 24 at 20:42;  Stop 24 at

21:14;  Status DC


Doxycycline Hyclate 250 ml @  125 mls/hr Q12H IV;  Start 12/15/24 at 12:00;  

Stop 12/15/24 at 10:45;  Status DC


Albuterol 1 UDVIAL [id number] IH Last administered on 24at 11:37;  Start 

12/15/24 at 00:00;  Stop 25 at 00:00


Furosemide 20 mg Q8H IV Last administered on 24at 12:42;  Start 24 

at 21:30;  Stop 25 at 21:29


Carvedilol 6.25 mg BIDMEALS PO Last administered on 24at 12:42;  Start 

24 at 22:00;  Stop 25 at 21:59


Insulin Human Regular INSULIN SLIDING SCAL... Q6H6 SQ Last administered on 

12/15/24at 18:22;  Start 12/15/24 at 00:00;  Stop 24 at 10:40;  Status DC


Dextrose 50 ml AD  PRN IV;  Start 24 at 21:30;  Stop 25 at 21:29


Glucagon 1 mg AD  PRN IM;  Start 24 at 21:30;  Stop 25 at 21:29


Methylprednisolone Sodium Succinate 125 mg ONCE  ONCE IVP Last administered on 

24at 22:11;  Start 24 at 22:00;  Stop 24 at 22:01;  Status DC


Methylprednisolone Sodium Succinate 40 mg BID IVP Last administered on 

12/15/24at 09:36;  Start 12/15/24 at 09:00;  Stop 12/15/24 at 10:45;  Status DC


Famotidine 20 mg DAILY IV Last administered on 24at 09:24;  Start 12/15/24

at 09:00;  Stop 25 at 08:59


Calcium Gluconate 1 gm/Sodium Chloride 100 ml @ 0 mls/hr PROTOCOL IV;  Start 

24 at 22:00;  Stop 25 at 21:59


Albuterol Sulfate 2.5MG Q4H  PRN IH;  Start 24 at 23:00;  Stop 25 at 

22:59


Acetaminophen 650 mg Q6H  PRN PO Last administered on 12/15/24at 17:35;  Start 

24 at 23:00;  Stop 12/15/24 at 17:42;  Status DC


Hydralazine HCl 10 mg Q6H  PRN IV Last administered on 24at 12:43;  Start 

24 at 23:00;  Stop 25 at 22:59


Heparin Sodium (Porcine) 5,000 unit BID SQ Last administered on 24at 

09:43;  Start 12/15/24 at 09:00;  Stop 25 at 08:59


Ondansetron HCl 4 mg Q6H  PRN IV Last administered on 24at 12:42;  Start 

24 at 23:00;  Stop 25 at 22:59


Heparin Sodium (Porcine) 5,000 unit BID SQ;  Start 12/15/24 at 09:00;  Stop 

24 at 23:12;  Status DC


Calcium Gluconate 1 gm/Sodium Chloride 100 ml @ 0 mls/hr PROTOCOL IV;  Start 

12/15/24 at 05:00;  Stop 12/15/24 at 04:51;  Status DC


Meropenem 500 mg/ Sodium Chloride 100 ml @  33.333 mls/ hr Q12H IV;  Start 

12/15/24 at 10:30;  Stop 12/15/24 at 10:48;  Status DC


Vancomycin HCl 1 each AD IV;  Start 12/15/24 at 10:30;  Stop 24 at 11:03; 

Status DC


Levofloxacin/ Dextrose 100 ml @  100 mls/hr Q24H IV;  Start 12/15/24 at 11:00;  

Stop 12/15/24 at 10:53;  Status DC


Meropenem 500 mg Q12H IVPB Last administered on 24at 11:53;  Start 

12/15/24 at 11:00;  Stop 24 at 10:59


Levofloxacin/ Dextrose 50 ml @ 50 mls/hr Q48H IVPB Last administered on 

24at 09:53;  Start 12/15/24 at 11:00;  Stop 24 at 10:59


Vancomycin HCl 250 ml @  125 mls/hr ONCE  ONCE IV;  Start 12/15/24 at 11:00;  

Stop 12/15/24 at 11:03;  Status DC


Vancomycin HCl 250 ml @  125 mls/hr ONCE  ONCE IV Last administered on 

12/15/24at 12:40;  Start 12/15/24 at 11:30;  Stop 12/15/24 at 13:29;  Status DC


Vancomycin HCl 250 ml @  125 mls/hr Q48H IV;  Start 24 at 11:30;  Stop 

24 at 11:01;  Status DC


Acetaminophen 650 mg Q6H  PRN PO Last administered on 24at 17:14;  Start 

12/15/24 at 17:30;  Stop 25 at 17:29


Potassium Chloride 20 meq ONCE  ONCE PO Last administered on 24at 09:18;  

Start 24 at 09:00;  Stop 24 at 09:03;  Status DC


Gabapentin 100 mg TID PO Last administered on 24at 14:57;  Start 24 

at 10:00;  Stop 1/15/25 at 09:59


Hydromorphone HCl 1 mg Q4H  PRN IVP Last administered on 24at 09:32;  

Start 24 at 10:00;  Stop 24 at 09:59


Insulin Human Regular INSULIN SLIDING SCAL... ACHS SQ Last administered on 

24at 17:21;  Start 24 at 11:30;  Stop 1/15/25 at 11:29


Aspirin 81 mg DAILY PO Last administered on 24at 09:24;  Start 24 at

09:00;  Stop 25 at 08:59


Atorvastatin Calcium 40 mg HS PO Last administered on 24at 20:46;  Start 

24 at 21:00;  Stop 25 at 20:59


Clopidogrel Bisulfate 75 mg DAILY PO Last administered on 24at 09:23;  

Start 24 at 09:00;  Stop 25 at 08:59


Montelukast Sodium 10 mg HS PO Last administered on 24at 20:46;  Start 

24 at 21:00;  Stop 25 at 20:59


Home Med Cholecalciferol (Vitamin D3) 1 CAP DAILY PO;  Start 24 at 09:00; 

Stop 25 at 08:59


Fluticasone Propionate 2 SPRAYS EACH NOST... DAILY EN;  Start 24 at 09:00;

 Stop 25 at 08:59


Pantoprazole Sodium 40 mg DAILY PO Last administered on 24at 09:24;  Start

24 at 09:00;  Stop 25 at 08:59


Vitamin B Complex/ Vit C/Folic Acid 1 cap DAILY PO Last administered on 

24at 09:24;  Start 24 at 09:00;  Stop 25 at 08:59


Hydralazine HCl 25 mg BID PO Last administered on 24at 09:24;  Start 

24 at 21:00;  Stop 25 at 20:59


Potassium Chloride 20 meq ONCE  ONCE PO Last administered on 24at 09:24;  

Start 24 at 09:00;  Stop 24 at 09:01;  Status DC


Magnesium Sulfate 50 ml @ 0 mls/hr PROTOCOL IV Last administered on 24at 

09:25;  Start 24 at 09:00;  Stop 25 at 08:59








ASSESSMENT:  


1. normal ef


2. renal failure


3. peripheral arterial disease with multiple intervention in the past


4. htn, hld, dm ii


5. concern for spinal abscess





PLAN:  


form a cardiovascular standpoint pt had echo that reveal normal ef on last 

admission


pt has negative non invasive work in our office 2024 - no need for repeat cadx

workp at this time


at this time pt has no anginal sx of cp  or sob


we have new recommendations to make at this time











TIERA VARELA PAC            Dec 18, 2024 17:14

## 2024-12-18 NOTE — PN
NEPHROLOGY PROGRESS NOTE








Date/Time Patient Seen: Dec 18, 2024





SUBJECTIVE:


This is a 69 year old female with a past medical history of congestive heart 

failure history, hypertension, hyperlipidemia and chronic low back pain. 


She presented to the emergency room with complaints of altered mental status.


Patient has a recent hospital admission for similar reason and was discharged on

12/11/2024


Blood culture has been negative 


She continues on broad-spectrum antibiotics, including vancomycin.


Imaging studies were noted.


Neurosurgery has been consulted for possible lumbar spines osteomyelitis


She was noted to have elevated BUN/creatinine.  


We are consulted for renal failure.  


Renal function is improving 


Electrolytes are stable.  


Hemoglobin is stable at 10.5 


Iron panel was noted.


Has been started on Gabapentin . 


She was seen in the medical floor,  in no acute distress


She is complaining of mouth sores.


Condition is critical and guarded





REVIEW OF SYSTEMS:


GENERAL:  Positive for generalized weakness


NEUROLOGIC: Negative for any blurry vision, blind spots, double vision, facial 

asymmetry, dysphagia, dysarthria, hemiparesis, hemisensory deficits, vertigo, 

ataxia.


HEENT: Negative for any head trauma, neck trauma, neck stiffness, photophobia, 

phonophobia, sinusitis, rhinitis.


CARDIAC: Negative for any chest pain, dyspnea on exertion, paroxysmal nocturnal 

dyspnea, peripheral edema.


PULMONARY: Negative for any shortness of breath, wheezing, COPD, or TB exposure.


GASTROINTESTINAL: Negative for any abdominal pain, nausea, vomiting, bright red 

blood per rectum, melena.


GENITOURINARY: Negative for any dysuria, hematuria, incontinence.


INTEGUMENTARY: Negative for any rashes, cuts, insect bites.


RHEUMATOLOGIC: Negative for any joint pains, photosensitive rashes, history of 

vasculitis or kidney problems.


HEMATOLOGIC: Negative for any abnormal bruising, frequent infections or 

bleeding.





                             Vital Signs (last 8hr)








  Date Time  Temp Pulse Resp B/P (MAP) Pulse Ox O2 Delivery O2 Flow Rate FiO2


 


12/16/24 12:03  82 16     


 


12/16/24 12:00 98.4 99 23 158/85 94 Room Air  


 


12/16/24 11:00  83 10 170/86 93 Room Air  


 


12/16/24 10:00  82 12 174/89 96 Room Air  


 


12/16/24 09:00  93 15 163/79 94 Room Air  


 


12/16/24 08:16    164/68    


 


12/16/24 08:00     95 Room Air* 0 21


 


12/16/24 08:00  97 17 161/94 93 Room Air  


 


12/16/24 07:00 97.9 93 15 164/68 90 Nasal Cannula 2.0 


 


12/16/24 06:38  92 16     


 


12/16/24 06:38  92 16   N/Cannula Low lpm 2.0 28








PHYSICAL EXAM:


GENERAL: Alert and oriented x 3. No acute distress. Well-nourished.


EYES: EOMI. Anicteric.


HENT: Moist mucous membranes. No scleral icterus. No cervical lymphadenopathy.


LUNGS: Clear to auscultation bilaterally. No accessory muscle use.


CARDIOVASCULAR: Regular rate and rhythm. No murmur. No JVD.


ABDOMEN: Soft, non-tender and non-distended. No palpable masses.


EXTREMITIES: No edema. Non-tender.


SKIN: No rashes or lesions. Warm.


NEUROLOGIC: No focal neurological deficits. CN II-XII grossly intact, but not 

individually tested.


PSYCHIATRIC: Cooperative. Appropriate mood and affect.








                               Current Medications








 Medications


  (Trade)  Dose


 Ordered  Sig/Chaim


 Route


 PRN Reason  Start Time


 Stop Time Status Last Admin


Dose Admin


 


 Acetaminophen


  (TYLenol 325MG


 TAB)  650 mg  Q6H  PRN


 PO


 TEMPERATURE GREATER THAN 101.5  12/14/24 23:00


 12/15/24 17:42 DC 12/15/24 17:35


650 MG


 


 Acetaminophen


  (TYLenol 325MG


 TAB)  650 mg  Q6H  PRN


 PO


 MILD PAIN (1-3)  12/15/24 17:30


 1/14/25 17:29  12/16/24 08:15


650 MG


 


 Albuterol


  (DUOneb)  1 UDVIAL  [id number]


 IH


   12/15/24 00:00


 1/14/25 00:00  12/16/24 12:02


1 UDVIAL


 


 Albuterol Sulfate


  (Proventil


 0.083% 2.5mg/3ml)  2.5MG  Q4H  PRN


 IH


 SHORTNESS OF BREATH  12/14/24 23:00


 1/13/25 22:59   





 


 Azithromycin  250 ml @ 


 250 mls/hr  Q24H  STAT


 IVPB


   12/14/24 20:16


 12/14/24 21:15 DC 12/14/24 20:42


250 MLS/HR


 


 Calcium Gluconate


 1 gm/Sodium


 Chloride  100 ml @ 0


 mls/hr  PROTOCOL


 IV


   12/14/24 22:00


 1/13/25 21:59   





 


 Calcium Gluconate


 1 gm/Sodium


 Chloride  100 ml @ 0


 mls/hr  PROTOCOL


 IV


   12/15/24 05:00


 12/15/24 04:51 DC  





 


 Carvedilol


  (Coreg 6.25MG)  6.25 mg  BIDMEALS


 PO


   12/14/24 22:00


 1/13/25 21:59  12/16/24 08:16


6.25 MG


 


 Ceftriaxone Sodium


  (ROCEphine 1G


 INJ)  1 gm  ONCE  STAT


 IVPB


   12/14/24 20:16


 12/14/24 20:18 DC 12/14/24 20:42


1 GM


 


 Dextrose


  (D50w)  50 ml  AD  PRN


 IV


 HYPOGLYCEMIA PROTOCOL  12/14/24 21:30


 1/13/25 21:29   





 


 Doxycycline


 Hyclate  250 ml @ 


 125 mls/hr  Q12H


 IV


   12/15/24 12:00


 12/15/24 10:45 DC  





 


 Famotidine


  (Pepcid 20mg


 Vial)  20 mg  DAILY


 IV


   12/15/24 09:00


 1/14/25 08:59  12/16/24 08:16


20 MG


 


 Furosemide


  (LASix 20MG VIAL)  20 mg  Q8H


 IV


   12/14/24 21:30


 1/13/25 21:29  12/16/24 05:44


20 MG


 


 Gabapentin


  (NEURontin 100


 mg CAP)  100 mg  TID


 PO


   12/16/24 10:00


 1/15/25 09:59  12/16/24 10:11


100 MG


 


 Glucagon


  (Glucagon 1mg


 Kit)  1 mg  AD  PRN


 IM


 HYPOGLYCEMIA PROTOCOL  12/14/24 21:30


 1/13/25 21:29   





 


 Heparin Sodium


  (Porcine)


  (HEParin 5,000


 UNIT VIAL)  5,000 unit  BID


 SQ


   12/15/24 09:00


 1/14/25 08:59  12/16/24 08:18


5,000 UNIT


 


 Heparin Sodium


  (Porcine)


  (HEParin 5,000


 UNIT VIAL)  5,000 unit  BID


 SQ


   12/15/24 09:00


 12/14/24 23:12 DC  





 


 Hydralazine HCl


  (APRESOLine 20MG


 INJ)  10 mg  Q6H  PRN


 IV


 For:SBP above 160;DBP above 90  12/14/24 23:00


 1/13/25 22:59  12/16/24 10:12


10 MG


 


 Hydromorphone HCl


  (DiLAUDid 1MG


 INJ)  1 mg  Q4H  PRN


 IVP


 SEVERE PAIN (7-10)  12/16/24 10:00


 12/21/24 09:59  12/16/24 10:43


1 MG


 


 Insulin Human


 Regular


  (humuLIN R 100


 UNIT/ML 3ML)  INSULIN


 SLIDING


 SCAL...  ACHS


 SQ


   12/16/24 11:30


 1/15/25 11:29   





 


 Insulin Human


 Regular


  (humuLIN R 100


 UNIT/ML 3ML)  INSULIN


 SLIDING


 SCAL...  Q6H6


 SQ


   12/15/24 00:00


 12/16/24 10:40 DC 12/15/24 18:22


3 UNIT


 


 Levofloxacin/


 Dextrose  50 ml @ 50


 mls/hr  Q48H


 IVPB


   12/15/24 11:00


 12/25/24 10:59  12/15/24 12:40


50 MLS/HR


 


 Levofloxacin/


 Dextrose  100 ml @ 


 100 mls/hr  Q24H


 IV


   12/15/24 11:00


 12/15/24 10:53 DC  





 


 Lorazepam


  (AtiVAN)  1 mg  ONCE  STAT


 IVP


   12/14/24 17:39


 12/14/24 17:40 DC 12/14/24 17:46


1 MG


 


 Meropenem


  (Merrem)  500 mg  Q12H


 IVPB


   12/15/24 11:00


 12/25/24 10:59  12/16/24 10:11


500 MG


 


 Meropenem 500 mg/


 Sodium Chloride  100 ml @ 


 33.333 mls/


 hr  Q12H


 IV


   12/15/24 10:30


 12/15/24 10:48 DC  





 


 Methylprednisolone


 Sodium Succinate


  (Solu-medROL


 40MG)  40 mg  BID


 IVP


   12/15/24 09:00


 12/15/24 10:45 DC 12/15/24 09:36


40 MG


 


 Ondansetron HCl


  (zoFRAN 4MG INJ)  4 mg  Q6H  PRN


 IV


 NAUSEA/VOMITING  12/14/24 23:00


 1/13/25 22:59  12/16/24 11:55


4 MG


 


 Piperacillin Sod/


 Tazobactam Sod


  (Zosyn


 3.375gm+NS 50ml)  3.375 gm  Q12H


 IVPB


   12/14/24 21:00


 12/15/24 10:45 DC 12/15/24 09:36


3.375 GM


 


 Sodium Chloride  1,000 ml @ 


 500 mls/hr  Q2H STAT


 IV


   12/14/24 20:04


 12/14/24 22:03 DC 12/14/24 20:40


500 MLS/HR


 


 Sodium Chloride


  (NS 50ml)  50 ml  AD


 IV


   12/15/24 00:00


 12/14/24 21:15 DC  





 


 Vancomycin HCl  250 ml @ 


 125 mls/hr  Q48H


 IV


   12/17/24 11:30


 12/27/24 11:29   





 


 Vancomycin HCl


  (Vancomycin


 Protocol)  1 each  AD


 IV


   12/15/24 10:30


 12/29/24 10:29   














LABORATORY:


[ ]








                                Hematology Labs:








Test


 12/18/24


05:20 Range/Units


 


 


White Blood Count 15.0 H 4.8-10.8  K/uL


 


Red Blood Count


 3.51 L


 4.00-5.50


MIL/uL


 


Hemoglobin 10.8 L 12.0-16.0  g/dL


 


Hematocrit 33.5 L 36-48  %


 


Mean Corpuscular Volume 95.4  79-99  fL


 


Mean Corpuscular Hemoglobin 30.8  27.0-33.0  pg


 


Mean Corpuscular Hemoglobin


Concent 32.2 


 32.0-36.0  g/dL





 


Red Cell Distribution Width 14.9  11.0-15.5  %


 


Platelet Count 229  130-400  K/uL


 


Mean Platelet Volume 11.7 H 7.5-10.5  fL


 


Immature Granulocyte % (Auto) 0.7  0-1  %


 


Neutrophils (%) (Auto) 73.6  40.0-77.0  %


 


Lymphocytes (%) (Auto) 12.3 L 21.0-51.0  %


 


Monocytes (%) (Auto) 8.5  3.0-13.0  %


 


Eosinophils (%) (Auto) 4.7  0.0-8.0  %


 


Basophils (%) (Auto) 0.2  0.0-5.0  %


 


Neutrophils # (Auto) 11.1 H 1.8-7.7  K/uL


 


Lymphocytes # (Auto) 1.9  1.0-4.8  K/uL


 


Monocytes # (Auto) 1.3 H 0.1-1.0  K/uL


 


Eosinophils # (Auto) 0.70  0.00-0.70  K/uL


 


Basophils # (Auto) 0.03  0.00-0.20  K/uL


 


Absolute Immature Granulocyte


(auto 0.10 


 0-1  K/uL





 


Nucleated Red Blood Cells 0.0  0.0-0.19  %








                                 Chemistry Labs:








Test


 12/18/24


11:26 12/18/24


05:20 12/17/24


04:12 Range/Units


 


 


Whole Blood Glucose 160 H     MG/DL


 


Bedside Glucose Comment Notified Nurse     


 


Sodium Level  146 H  136-145  mmol/L


 


Potassium Level  3.3 L  3.5-5.1  mmol/L


 


Chloride Level  111   101-111  mmol/L


 


Carbon Dioxide Level  27   21-32  mmol/L


 


Blood Urea Nitrogen  66 H  7-18  mg/dL


 


Creatinine  3.0 H  0.5-1.0  mg/dL


 


Glomerular Filtration Rate


Calc 


 17 


 


 >90  mL/min





 


Random Glucose  149 H    mg/dL


 


Total Calcium  7.9 L  8.5-10.1  mg/dL


 


Phosphorus Level  4.9   2.5-4.9  mg/dL


 


Magnesium Level


 


 1.70 L


 


 1.80-2.40


mg/dL


 


Total Bilirubin  0.3   0.2-1.0  mg/dL


 


Aspartate Amino Transf


(AST/SGOT) 


 31 


 


 10-37  U/L





 


Alanine Aminotransferase


(ALT/SGPT) 


 31 


 


 12-78  U/L





 


Alkaline Phosphatase  94     U/L


 


Total Protein  4.9 L  6.0-8.3  g/dL


 


Albumin  1.5 L  3.5-5.0  g/dL


 


Iron Level   47 #L   mcg/dL


 


Total Iron Binding Capacity   191 L 250-450  mcg/dL


 


Percent Iron Saturation   24.6  22-44  %


 


Ferritin   441 H   ng/mL


 


Procalcitonin   0.05  0.05-0.5  ng/mL











DIAGNOSTICS / RADIOLOGY:


REASON: AMS


ORDERING PHYSICIAN: JOHN PAUL CONDE


PROCEDURE: BRAIN WO  -  MR BRAIN WO CON





MR BRAIN WO CON





HISTORY: Altered mental status





COMPARISON: None





TECHNIQUE: MRI of the brain was performed utilizing multiple pulse


sequences in axial, coronal and sagittal planes. Patient was not given


contrast through intravenous route.





FINDINGS: The ventricles and extraventricular CSF spaces are dilated


consistent with cerebral atrophy. Nonspecific white matter changes are


seen. There is no midline shift, mass effect or herniation. No subacute


hemorrhage is seen. No MR evidence of acute infarct is seen in the


diffusion weighted images. Cerebellar tonsils are in normal position.


There are bilateral ethmoid and sphenoid sinusitis with mucoperiosteal


thickening.  No MR evidence of a mass lesion is seen in this noncontrast


study.





IMPRESSION: 


1. No MR evidence of acute infarct is seen in the diffusion weighted


images. Atrophy with white matter changes.








DICTATED BY: SHAQ ZUNIGA MD


DATE: 12/16/24 1655


REASON: PNEUMONIA


ORDERING PHYSICIAN: ISAAC JUÁREZ MD


PROCEDURE: CHEST WO  -  CT CHEST W/O CONTRAST





CT CHEST W/O CONTRAST





HISTORY:   Pneumonia





COMPARISON: None





TECHNIQUE: Multiple sequential axial images of the chest were obtained


from the thoracic inlet through upper abdomen. Patient was not given


contrast through intravenous route.





FINDINGS: There are bilateral pleural effusions with compressive


atelectasis. Coronary arterial calcifications are seen. Gallbladder is


distended. There is mild anasarca.  No pleural effusion or pericardial


effusion is seen. There is no evidence of pneumothorax. There are normal


size mediastinal and hilar lymph nodes. The heart is not enlarged.


Degenerative changes of the thoracolumbar spine are present.  There is


no evidence of adrenal nodule.





IMPRESSION: 


1. Mild to moderate bilateral pleural effusions with compressive


atelectasis.














CT was performed with one or more following dose reduction techniques:


automated exposure control, adjustment of the mA and kv according to


patient's size, or use of a iterative reconstruction technique.








DICTATED BY: SHAQ ZUNIGA MD


DATE: 12/16/24 1715





REASON: Rule out ostemyelitis


ORDERING PHYSICIAN: TANNER PERRY CNP


PROCEDURE: L SPN WO  -  MR SPINAL CANAL, LUMBAR WO CON





MR SPINAL CANAL, LUMBAR WO CON





HISTORY: Pain





COMPARISON: CT from 12/15/2024





TECHNIQUE: MRI of the lumbar spine was performed utilizing multiple


pulse sequences in axial and sagittal plane. Patient was not given


contrast through intravenous route. 





FINDINGS: Endplate degenerative changes with disc space narrowing are


seen predominantly involving the L1-L2 level. Orthopedic fixation plates


and screws are seen traversing the T12 and L1 with paramagnetic


susceptibility artifacts limiting evaluation. There is disc noted at the


lumbosacral junction and for numbering purposes only, this level will be


designated as S1-S2. No abnormal signal intensity is seen of the


visualized bony structure.  No loss of vertebral height is seen.  There


is straightening of normal lumbar curvature which may be related to


muscle spasm or positioning. Degenerative disc signals are present at


all lumbar spine levels. Visualized distal conus is unremarkable.





At the L2-3 level,  there is spondylotic with central disc


protrusion/herniation with bilateral ligamentum flavum hypertrophy


causing anterior thecal sac compression with bilateral lateral recess


stenosis and bilateral neural foraminal stenosis. The thecal sac


measures approximately 7 mm in its anterior posterior dimension.





At the L3-4 level,  there is spondylotic disc with bilateral ligamentum


flavum hypertrophy causing anterior thecal sac compression with


bilateral lateral recess stenosis and bilateral neural foraminal


stenosis. The thecal sac measures approximately 6.2 mm in its anterior


posterior dimension.





At the L4-5 level,  there is spondylotic disc with bilateral ligamentum


flavum hypertrophy causing anterior thecal sac compression with


bilateral lateral recess stenosis and bilateral neural foraminal


stenosis. The thecal sac measures approximately 8.4 mm in its anterior


posterior dimension.





At the L5-S1 level,  there is spondylotic disc with bilateral ligamentum


flavum hypertrophy and facet hypertrophy causing anterior thecal sac


compression with bilateral lateral recess stenosis and bilateral neural


foraminal stenosis. The thecal sac measures approximately 8.2 mm in its


anterior posterior dimension.





IMPRESSION:


1. DJD with lumbar spine spondylosis as described above. Evaluation at


T12 and L1 are limited due to postop artifacts.











DICTATED BY: SHAQ ZUNIGA MD


DATE: 12/16/24 1650





REASON: sepsis, backpain, ruling out device infection


ORDERING PHYSICIAN: TANNER PERRY CNP


PROCEDURE: T SPINE WO  -  CT THORACIC SPINE W/O CONTRAST





CT THORACIC SPINE W/O CONTRAST, CT LUMBAR SPINE W/O CONTRAST





HISTORY: sepsis, back pain, ruling out device infection





TECHNIQUE: CT THORACIC SPINE W/O CONTRAST, CT LUMBAR SPINE W/O CONTRAST.


Coronal and sagittal reformats were obtained. CT was performed with one


or more of the following dose reduction techniques: Automated exposure


control, adjustment of the mA and/or kV according to the patient's size,


or use of the iterative reconstruction technique.








FINDINGS: 


Evaluation of the cord and discs is limited with CT. 


No evidence of compression fracture or dislocation.  


There is diffuse osteopenia degraded evaluation of the study.


Multilevel degenerative changes are noted. Postop changes of posterior


fusion are seen at L1-L2 on the right with pedicle screws and fixation


bar. Advanced degenerative changes are seen at L2-L3 with disc space


loss, endplate osteophytes and endplate sclerosis/lucencies, underlying


infection is not excluded. Correlate clinically. Stimulator leads are


seen in the lower thoracic spine.


Small left and moderate right pleural effusion with bilateral lower lobe


consolidation.


The aorta is normal in caliber. No acute findings in the visualized


abdomen.  The visualized lungs are clear. 








IMPRESSION: 


There is diffuse osteopenia degraded evaluation of the study.


Multilevel degenerative changes are noted. Postop changes of posterior


fusion are seen at L1-L2 on the right with pedicle screws and fixation


bar. Advanced degenerative changes are seen at L2-L3 with disc space


loss, endplate osteophytes and endplate sclerosis/lucencies, underlying


infection is not excluded. Correlate clinically. Stimulator leads are


seen in the lower thoracic spine.


Small left and moderate right pleural effusion with bilateral lower lobe


consolidation..




















DICTATED BY: TUCKER RAYO MD


DATE: 12/15/24 1245





REASON: sepsis, backpain, ruling out device infection


ORDERING PHYSICIAN: TANNER PERRY CNP


PROCEDURE: L SPIN WO  -  CT LUMBAR SPINE W/O CONTRAST





CT THORACIC SPINE W/O CONTRAST, CT LUMBAR SPINE W/O CONTRAST





HISTORY: sepsis, back pain, ruling out device infection





TECHNIQUE: CT THORACIC SPINE W/O CONTRAST, CT LUMBAR SPINE W/O CONTRAST.


Coronal and sagittal reformats were obtained. CT was performed with one


or more of the following dose reduction techniques: Automated exposure


control, adjustment of the mA and/or kV according to the patient's size,


or use of the iterative reconstruction technique.








FINDINGS: 


Evaluation of the cord and discs is limited with CT. 


No evidence of compression fracture or dislocation.  


There is diffuse osteopenia degraded evaluation of the study.


Multilevel degenerative changes are noted. Postop changes of posterior


fusion are seen at L1-L2 on the right with pedicle screws and fixation


bar. Advanced degenerative changes are seen at L2-L3 with disc space


loss, endplate osteophytes and endplate sclerosis/lucencies, underlying


infection is not excluded. Correlate clinically. Stimulator leads are


seen in the lower thoracic spine.


Small left and moderate right pleural effusion with bilateral lower lobe


consolidation.


The aorta is normal in caliber. No acute findings in the visualized


abdomen.  The visualized lungs are clear. 








IMPRESSION: 


There is diffuse osteopenia degraded evaluation of the study.


Multilevel degenerative changes are noted. Postop changes of posterior


fusion are seen at L1-L2 on the right with pedicle screws and fixation


bar. Advanced degenerative changes are seen at L2-L3 with disc space


loss, endplate osteophytes and endplate sclerosis/lucencies, underlying


infection is not excluded. Correlate clinically. Stimulator leads are


seen in the lower thoracic spine.


Small left and moderate right pleural effusion with bilateral lower lobe


consolidation..




















DICTATED BY: TUCKER RAYO MD


DATE: 12/15/24 1245





REASON: AMS


ORDERING PHYSICIAN: BRAYAN GENAO NP


PROCEDURE: HEAD WO  -  CT HEAD/BRAIN W/O CONTRAST





CT HEAD/BRAIN W/O CONTRAST





INDICATION:   AMS





TECHNIQUE: CT HEAD/BRAIN W/O CONTRAST. CT was performed with one or more


of the following dose reduction techniques: Automated exposure control,


adjustment of the mA and/or kV according to the patient's size, or use


of the iterative reconstruction technique.


Comparison: None





FINDINGS:


Cerebral atrophy seen. Nonspecific periventricular and subcortical white


matters changes are noted likely representing small vessel ischemic


changes. No midline shift or herniation. No extra axial collection. No


acute intracranial bleed.


The visualized paranasal sinuses and mastoid air cells are normally


aerated.











IMPRESSION: 


Mild atrophy. No acute intracranial bleed is seen.


Scattered nonspecific white matter changes 























DICTATED BY: TUCKER RAYO MD


DATE: 12/14/24 1736





REASON: SOB


ORDERING PHYSICIAN: BRAYAN GENAO NP


PROCEDURE: CXR1VW  -  CHEST 1VW














INDICATION: SOB





TECHNIQUE: CHEST 1VW





COMPARISON: 12/12/2024








FINDINGS/IMPRESSION:


Bilateral airspace consolidation suggesting vascular congestion/edema


versus pneumonia. Small right effusion is seen.


Cardiomegaly is seen 


Mild degenerative changes of the spine.


The visualized upper abdomen appears unremarkable.


























DICTATED BY: TUCKER RAYO MD


DATE: 12/14/24 1819





ASSESSMENT: 


Hypokalemia


Acute on chronic renal failure 


Nephrotic syndrome


Metabolic encephalopathy


Sepsis without shock


Pneumonia


Diabetes mellitus type 2


Hypertension


Obesity BMI 31.7


PAD both lower extremities


Right pleural effusion with mild interstitial fibrosis


Possible infectious process involving L1-L2.





PLAN: 


Labs, diagnostic, radiologic exams reviewed and interpreted by myself and 

supervising physician. 


We have reviewed external records in detail


Continue with low dose Gabapentin, 


Require close monitoring of renal function and electrolytes 


Order CBC, CMP, and electrolytes in am 


Follow blood cultures


BiPAP as necessary, for respiratory distress


Monitor blood pressure adjust medication doses as needed


Avoid hypotensive episodes


May use Dilaudid 0.5 mg IV every 6 hours as needed for severe pain


Monitor blood sugars 


Strict intake, output, and daily weight should be monitored


Please renally adjust medications


Avoid nephrotoxic and nonsteroidal drugs


Avoid contrast if possible


Will continue to monitor renal function, anemia, electrolytes


Treatment plan discussed with patient


Questions were answered


We have discussed with the other team physicians in detail about the care plan


We will continue to monitor the patient closely


ATTESTATION BY PHYSICIAN


I have seen and examined the patient. I reviewed the documentation, medical 

decision making, and treatment plan as noted by the mid-level provider above. I 

agree with the findings and plan of care.











ELISEO FARRELL MD, [facility]             Dec 18, 2024 14:17

## 2024-12-18 NOTE — PN
INFECTIOUS DISEASE  PROGRESS NOTE








Date of Service: Dec 18, 2024





SUBJECTIVE:


This is a 69-year-old female patient who was admitted with chief complaint 

altered mental status and muscle spasms. An MRI of the lumbar spine was obtained

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 

causing anterior thecal sac compression at the L4-5 level.


Patient was seen and examined at bedside in room 327.  Patient is awake and 

answering questions appropriately.  No fever reported within the last 24 hours, 

temperature is 98.8 and the WBC is 15.0.  Patient continues on levofloxacin and

Meropenem.  No reports of nausea or vomiting.  Will continue to follow patient's

care.








PHYSICAL EXAM


EYES:  Anicteric.  Pupils equal and reactive.


HENT: No oral thrush seen, moist Oral mucosa.


NECK:  Supple, no JVD or thyromegaly.


LUNGS:  Good air entry.  No rales, no rhonchi.


CARDIOVASCULAR:  S1, S2 regular.  No murmur heard.


ABDOMEN:  Soft, non tender, bowel sounds present, no organomegaly.


CENTRAL NERVOUS SYSTEM:  Awake, alert, oriented x 3.


SKIN:  No rashes, no swelling.


LYMPHATICS: No peripheral lymphadenopathy.


MUSCULOSKELETAL:  No joint swelling, erythema or tenderness.


EXTREMITIES:  No cyanosis or clubbing.


BACK:  No deformity, no pressure ulcer.


GENITOURINARY:  No dysuria or hematuria.








Vital Sign (Last 12 Hours)








 12/18/24 12/18/24 12/18/24 12/18/24





 07:03 07:04 08:00 11:39


 


Temp   98.8 


 


Pulse 82 82 91 78


 


Resp 16 18 18 16


 


B/P (MAP)   193/107 


 


Pulse Ox   95 


 


O2 Delivery  N/A Room Air Room Air 


 


FiO2  21 21 


 


    





 12/18/24 12/18/24 12/18/24 





 11:40 12:42 14:08 


 


Pulse 78  90 


 


Resp 18   


 


B/P (MAP)  176/88 139/73 


 


O2 Delivery N/A Room Air   


 


FiO2 21   














Intake & Output (last 24hrs)   


 


 12/17/24 12/17/24 12/18/24





 15:00 23:00 07:00


 


Intake Total  300 ml 100.0 ml


 


Output Total 2100 ml  1400 ml


 


Balance -2100 ml 300 ml -1300.0 ml











LABS:








                                   Laboratory:








Test


 12/18/24


11:26 12/18/24


05:20 12/17/24


04:12 Range/Units


 


 


Whole Blood Glucose 160 H     MG/DL


 


Bedside Glucose Comment Notified Nurse     


 


White Blood Count  15.0 H  4.8-10.8  K/uL


 


Red Blood Count


 


 3.51 L


 


 4.00-5.50


MIL/uL


 


Hemoglobin  10.8 L  12.0-16.0  g/dL


 


Hematocrit  33.5 L  36-48  %


 


Mean Corpuscular Volume  95.4   79-99  fL


 


Mean Corpuscular Hemoglobin  30.8   27.0-33.0  pg


 


Mean Corpuscular Hemoglobin


Concent 


 32.2 


 


 32.0-36.0  g/dL





 


Red Cell Distribution Width  14.9   11.0-15.5  %


 


Platelet Count  229   130-400  K/uL


 


Mean Platelet Volume  11.7 H  7.5-10.5  fL


 


Immature Granulocyte % (Auto)  0.7   0-1  %


 


Neutrophils (%) (Auto)  73.6   40.0-77.0  %


 


Lymphocytes (%) (Auto)  12.3 L  21.0-51.0  %


 


Monocytes (%) (Auto)  8.5   3.0-13.0  %


 


Eosinophils (%) (Auto)  4.7   0.0-8.0  %


 


Basophils (%) (Auto)  0.2   0.0-5.0  %


 


Neutrophils # (Auto)  11.1 H  1.8-7.7  K/uL


 


Lymphocytes # (Auto)  1.9   1.0-4.8  K/uL


 


Monocytes # (Auto)  1.3 H  0.1-1.0  K/uL


 


Eosinophils # (Auto)  0.70   0.00-0.70  K/uL


 


Basophils # (Auto)  0.03   0.00-0.20  K/uL


 


Absolute Immature Granulocyte


(auto 


 0.10 


 


 0-1  K/uL





 


Nucleated Red Blood Cells  0.0   0.0-0.19  %


 


Sodium Level  146 H  136-145  mmol/L


 


Potassium Level  3.3 L  3.5-5.1  mmol/L


 


Chloride Level  111   101-111  mmol/L


 


Carbon Dioxide Level  27   21-32  mmol/L


 


Blood Urea Nitrogen  66 H  7-18  mg/dL


 


Creatinine  3.0 H  0.5-1.0  mg/dL


 


Glomerular Filtration Rate


Calc 


 17 


 


 >90  mL/min





 


Random Glucose  149 H    mg/dL


 


Total Calcium  7.9 L  8.5-10.1  mg/dL


 


Phosphorus Level  4.9   2.5-4.9  mg/dL


 


Magnesium Level


 


 1.70 L


 


 1.80-2.40


mg/dL


 


Total Bilirubin  0.3   0.2-1.0  mg/dL


 


Aspartate Amino Transf


(AST/SGOT) 


 31 


 


 10-37  U/L





 


Alanine Aminotransferase


(ALT/SGPT) 


 31 


 


 12-78  U/L





 


Alkaline Phosphatase  94     U/L


 


Total Protein  4.9 L  6.0-8.3  g/dL


 


Albumin  1.5 L  3.5-5.0  g/dL


 


Iron Level   47 #L   mcg/dL


 


Total Iron Binding Capacity   191 L 250-450  mcg/dL


 


Percent Iron Saturation   24.6  22-44  %


 


Ferritin   441 H   ng/mL


 


Procalcitonin   0.05  0.05-0.5  ng/mL














ASSESSMENT:


Leukocytosis.  


Acute on chronic renal failure.  


Heart failure.  


Multifactorial Encephalopathy.


Diabetes mellitus.  


Iron-deficiency Anemia.  


Bilateral pleural effusion.


Anterior thecal sac compression on lumbar MRI.








PLAN:


Continue levofloxacin.  


Continue Meropenem. 


Continue GI prophylaxis.  


Patient will need to follow up with neurosurgeon as outpatient.


Continue pain management.


Continue monitoring glucose levels.


We will follow up on the cultures.





This case was reviewed and discussed with my supervising physician and the above

assessment and plan was formulated and agreed upon.


ATTESTATION BY PHYSICIAN


I have seen and examined the patient. I reviewed the documentation, medical 

decision making, and treatment plan as noted by the mid-level provider above. I 

agree with the findings and plan of care.











ISAAC JUÁREZ MD, MIRTA L [facility]             Dec 18, 2024 16:50

## 2024-12-18 NOTE — PN
INFECTIOUS DISEASE  PROGRESS NOTE








Date of Service: Dec 17, 2024





SUBJECTIVE:


This is a 69-year-old female patient who was admitted with chief complaint 

altered mental status and muscle spasms. An MRI of the lumbar spine was obtained

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 

causing anterior thecal sac compression at the L4-5 level.


Patient was seen and examined at bedside in room 327.  Patient is awake and 

answering questions appropriately.  Patient is afebrile, temperature is 97.9.  

The WBC has trended down to 15.9 today from an 18.5 yesterday.  Patient voicing 

severe pain to the lower extremities.  Patient was updated with the MRI results.

 Patient continues on levofloxacin and Meropenem.  No growth reported yet on 

blood cultures.  Will continue to follow patient's care.








PHYSICAL EXAM


EYES:  Anicteric.  Pupils equal and reactive.


HENT: No oral thrush seen, moist Oral mucosa


NECK:  Supple, no JVD or thyromegaly.


LUNGS:  Good air entry.  No rales, no rhonchi.


CARDIOVASCULAR:  S1, S2 regular.  No murmur heard.


ABDOMEN:  Soft, non tender, bowel sounds present, no organomegaly


CENTRAL NERVOUS SYSTEM:  Awake, alert, oriented x 3.


SKIN:  No rashes, no swelling.


LYMPHATICS: No peripheral lymphadenopathy


MUSCULOSKELETAL:  No joint swelling, erythema or tenderness.


EXTREMITIES:  No cyanosis or clubbing


BACK:  No deformity, no pressure ulcer.


GENITOURINARY:  No dysuria or hematuria.








Vital Sign (Last 12 Hours)








 12/18/24 12/18/24 12/18/24 12/18/24





 07:03 07:04 08:00 11:39


 


Temp   98.8 


 


Pulse 82 82 91 78


 


Resp 16 18 18 16


 


B/P (MAP)   193/107 


 


Pulse Ox   95 


 


O2 Delivery  N/A Room Air Room Air 


 


FiO2  21 21 


 


    





 12/18/24 12/18/24 12/18/24 





 11:40 12:42 14:08 


 


Pulse 78  90 


 


Resp 18   


 


B/P (MAP)  176/88 139/73 


 


O2 Delivery N/A Room Air   


 


FiO2 21   














Intake & Output (last 24hrs)   


 


 12/17/24 12/17/24 12/18/24





 15:00 23:00 07:00


 


Intake Total  300 ml 100.0 ml


 


Output Total 2100 ml  1400 ml


 


Balance -2100 ml 300 ml -1300.0 ml











LABS:








                                   Laboratory:








Test


 12/18/24


11:26 12/18/24


05:20 12/17/24


04:12 Range/Units


 


 


Whole Blood Glucose 160 H     MG/DL


 


Bedside Glucose Comment Notified Nurse     


 


White Blood Count  15.0 H  4.8-10.8  K/uL


 


Red Blood Count


 


 3.51 L


 


 4.00-5.50


MIL/uL


 


Hemoglobin  10.8 L  12.0-16.0  g/dL


 


Hematocrit  33.5 L  36-48  %


 


Mean Corpuscular Volume  95.4   79-99  fL


 


Mean Corpuscular Hemoglobin  30.8   27.0-33.0  pg


 


Mean Corpuscular Hemoglobin


Concent 


 32.2 


 


 32.0-36.0  g/dL





 


Red Cell Distribution Width  14.9   11.0-15.5  %


 


Platelet Count  229   130-400  K/uL


 


Mean Platelet Volume  11.7 H  7.5-10.5  fL


 


Immature Granulocyte % (Auto)  0.7   0-1  %


 


Neutrophils (%) (Auto)  73.6   40.0-77.0  %


 


Lymphocytes (%) (Auto)  12.3 L  21.0-51.0  %


 


Monocytes (%) (Auto)  8.5   3.0-13.0  %


 


Eosinophils (%) (Auto)  4.7   0.0-8.0  %


 


Basophils (%) (Auto)  0.2   0.0-5.0  %


 


Neutrophils # (Auto)  11.1 H  1.8-7.7  K/uL


 


Lymphocytes # (Auto)  1.9   1.0-4.8  K/uL


 


Monocytes # (Auto)  1.3 H  0.1-1.0  K/uL


 


Eosinophils # (Auto)  0.70   0.00-0.70  K/uL


 


Basophils # (Auto)  0.03   0.00-0.20  K/uL


 


Absolute Immature Granulocyte


(auto 


 0.10 


 


 0-1  K/uL





 


Nucleated Red Blood Cells  0.0   0.0-0.19  %


 


Sodium Level  146 H  136-145  mmol/L


 


Potassium Level  3.3 L  3.5-5.1  mmol/L


 


Chloride Level  111   101-111  mmol/L


 


Carbon Dioxide Level  27   21-32  mmol/L


 


Blood Urea Nitrogen  66 H  7-18  mg/dL


 


Creatinine  3.0 H  0.5-1.0  mg/dL


 


Glomerular Filtration Rate


Calc 


 17 


 


 >90  mL/min





 


Random Glucose  149 H    mg/dL


 


Total Calcium  7.9 L  8.5-10.1  mg/dL


 


Phosphorus Level  4.9   2.5-4.9  mg/dL


 


Magnesium Level


 


 1.70 L


 


 1.80-2.40


mg/dL


 


Total Bilirubin  0.3   0.2-1.0  mg/dL


 


Aspartate Amino Transf


(AST/SGOT) 


 31 


 


 10-37  U/L





 


Alanine Aminotransferase


(ALT/SGPT) 


 31 


 


 12-78  U/L





 


Alkaline Phosphatase  94     U/L


 


Total Protein  4.9 L  6.0-8.3  g/dL


 


Albumin  1.5 L  3.5-5.0  g/dL


 


Iron Level   47 #L   mcg/dL


 


Total Iron Binding Capacity   191 L 250-450  mcg/dL


 


Percent Iron Saturation   24.6  22-44  %


 


Ferritin   441 H   ng/mL


 


Procalcitonin   0.05  0.05-0.5  ng/mL














ASSESSMENT:


Leukocytosis.  


Acute on chronic renal failure.  


Heart failure.  


Multifactorial Encephalopathy.


Diabetes mellitus.  


Iron-deficiency Anemia.  


Bilateral pleural effusion.


Anterior thecal sac compression on lumbar MRI.








PLAN:


Continue levofloxacin.  


Continue Meropenem. 


Continue GI prophylaxis.  


Patient will need to follow up with neurosurgeon as outpatient.


Continue pain management.


Continue monitoring glucose levels.


We will follow up on the cultures.





This case was reviewed and discussed with my supervising physician and the above

assessment and plan was formulated and agreed upon.


ATTESTATION BY PHYSICIAN


I have seen and examined the patient. I reviewed the documentation, medical 

decision making, and treatment plan as noted by the mid-level provider above. I 

agree with the findings and plan of care.











ISAAC JUÁREZ MD, MIRTA L [facility]             Dec 18, 2024 16:31

## 2024-12-19 VITALS
HEART RATE: 78 BPM | RESPIRATION RATE: 18 BRPM | SYSTOLIC BLOOD PRESSURE: 141 MMHG | DIASTOLIC BLOOD PRESSURE: 76 MMHG | TEMPERATURE: 98.2 F

## 2024-12-19 VITALS
RESPIRATION RATE: 18 BRPM | SYSTOLIC BLOOD PRESSURE: 190 MMHG | DIASTOLIC BLOOD PRESSURE: 90 MMHG | TEMPERATURE: 97.9 F | HEART RATE: 78 BPM

## 2024-12-19 VITALS — RESPIRATION RATE: 18 BRPM | OXYGEN SATURATION: 98 % | HEART RATE: 77 BPM

## 2024-12-19 VITALS
DIASTOLIC BLOOD PRESSURE: 89 MMHG | SYSTOLIC BLOOD PRESSURE: 181 MMHG | HEART RATE: 90 BPM | TEMPERATURE: 98.2 F | RESPIRATION RATE: 16 BRPM

## 2024-12-19 VITALS
RESPIRATION RATE: 20 BRPM | SYSTOLIC BLOOD PRESSURE: 145 MMHG | TEMPERATURE: 98.1 F | DIASTOLIC BLOOD PRESSURE: 77 MMHG | HEART RATE: 80 BPM

## 2024-12-19 VITALS
HEART RATE: 93 BPM | TEMPERATURE: 98.1 F | RESPIRATION RATE: 16 BRPM | DIASTOLIC BLOOD PRESSURE: 88 MMHG | SYSTOLIC BLOOD PRESSURE: 181 MMHG

## 2024-12-19 VITALS — OXYGEN SATURATION: 96 %

## 2024-12-19 VITALS
DIASTOLIC BLOOD PRESSURE: 69 MMHG | TEMPERATURE: 98.4 F | RESPIRATION RATE: 18 BRPM | SYSTOLIC BLOOD PRESSURE: 131 MMHG | HEART RATE: 80 BPM

## 2024-12-19 VITALS — RESPIRATION RATE: 19 BRPM | HEART RATE: 65 BPM

## 2024-12-19 VITALS — RESPIRATION RATE: 18 BRPM | HEART RATE: 64 BPM | OXYGEN SATURATION: 96 %

## 2024-12-19 VITALS — OXYGEN SATURATION: 94 %

## 2024-12-19 LAB
ALBUMIN SERPL-MCNC: 1.4 G/DL (ref 3.5–5)
ALT SERPL-CCNC: 26 U/L (ref 12–78)
AST SERPL-CCNC: 25 U/L (ref 10–37)
BILIRUB SERPL-MCNC: 0.3 MG/DL (ref 0.2–1)
BUN SERPL-MCNC: 65 MG/DL (ref 7–18)
CHLORIDE SERPL-SCNC: 112 MMOL/L (ref 101–111)
CO2 SERPL-SCNC: 29 MMOL/L (ref 21–32)
CREAT SERPL-MCNC: 2.9 MG/DL (ref 0.5–1)
ERYTHROCYTE [DISTWIDTH] IN BLOOD BY AUTOMATED COUNT: 14.7 % (ref 11–15.5)
GFR SERPL CREATININE-BSD FRML MDRD: 17 ML/MIN (ref 90–?)
GLUCOSE SERPL-MCNC: 134 MG/DL (ref 70–105)
HCT VFR BLD AUTO: 32.5 % (ref 36–48)
MAGNESIUM SERPL-MCNC: 2.2 MG/DL (ref 1.8–2.4)
MCH RBC QN AUTO: 30.6 PG (ref 27–33)
MCHC RBC AUTO-ENTMCNC: 32 G/DL (ref 32–36)
MCV RBC AUTO: 95.6 FL (ref 79–99)
NRBC BLD MANUAL-RTO: 0 % (ref 0–0.19)
PLATELET # BLD AUTO: 237 K/UL (ref 130–400)
POTASSIUM SERPL-SCNC: 3.4 MMOL/L (ref 3.5–5.1)
PROT SERPL-MCNC: 4.8 G/DL (ref 6–8.3)
RBC # BLD AUTO: 3.4 MIL/UL (ref 4–5.5)
SODIUM SERPL-SCNC: 147 MMOL/L (ref 136–145)
WBC # BLD AUTO: 14 K/UL (ref 4.8–10.8)

## 2024-12-19 RX ADMIN — POTASSIUM CHLORIDE ONE MEQ: 1500 TABLET, EXTENDED RELEASE ORAL at 19:18

## 2024-12-19 NOTE — PN
INFECTIOUS DISEASE  PROGRESS NOTE








Date of Service: Dec 19, 2024





SUBJECTIVE:


This is a 69-year-old female patient who was admitted with chief complaint 

altered mental status and muscle spasms. An MRI of the lumbar spine was obtained

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 

causing anterior thecal sac compression at the L4-5 level.


Patient was seen and examined at bedside in room 327.  Patient continues on 

levofloxacin and Meropenem.  No growth reported on the blood cultures.  No 

reports of nausea or vomiting.  Will continue to follow patient's care.








PHYSICAL EXAM


EYES:  Anicteric.  Pupils equal and reactive.


HENT: No oral thrush seen, moist Oral mucosa.


NECK:  Supple, no JVD or thyromegaly.


LUNGS:  Good air entry.  No rales, no rhonchi.


CARDIOVASCULAR:  S1, S2 regular.  No murmur heard.


ABDOMEN:  Soft, non tender, bowel sounds present, no organomegaly.


CENTRAL NERVOUS SYSTEM:  Awake, alert, oriented x 3.


SKIN:  No rashes, no swelling.


LYMPHATICS: No peripheral lymphadenopathy.


MUSCULOSKELETAL:  No joint swelling, erythema or tenderness.


EXTREMITIES:  No cyanosis or clubbing.


BACK:  No deformity, no pressure ulcer.


GENITOURINARY:  No dysuria or hematuria.








Vital Sign (Last 12 Hours)








 12/19/24 12/19/24 12/19/24 12/19/24





 16:00 17:38 18:57 18:57


 


Temp 97.9   


 


Pulse 78  68 64


 


Resp 18  18 18


 


B/P (MAP) 190/90 190/90  


 


Pulse Ox 97   


 


O2 Delivery Room Air   N/A Room Air


 


FiO2 21   21


 


    





 12/19/24 12/19/24  





 20:00 22:56  


 


Temp 98.1   


 


Pulse 80 65  


 


Resp 20 19  


 


B/P (MAP) 145/77   


 


Pulse Ox 94   


 


O2 Delivery Room Air   














Intake & Output (last 24hrs)   


 


 12/18/24 12/18/24 12/19/24





 15:00 23:00 07:00


 


Output Total  1600 ml 


 


Balance  -1600 ml 











LABS:








                                   Laboratory:








Test


 12/19/24


20:11 12/19/24


11:07 12/19/24


04:39 12/18/24


05:20 Range/Units


 


 


Whole Blood Glucose 182 H      MG/DL


 


Bedside Glucose Comment  Notified Nurse     


 


White Blood Count   14.0 H  4.8-10.8  K/uL


 


Red Blood Count


 


 


 3.40 L


 


 4.00-5.50


MIL/uL


 


Hemoglobin   10.4 L  12.0-16.0  g/dL


 


Hematocrit   32.5 L  36-48  %


 


Mean Corpuscular Volume   95.6   79-99  fL


 


Mean Corpuscular Hemoglobin   30.6   27.0-33.0  pg


 


Mean Corpuscular Hemoglobin


Concent 


 


 32.0 


 


 32.0-36.0  g/dL





 


Red Cell Distribution Width   14.7   11.0-15.5  %


 


Platelet Count   237   130-400  K/uL


 


Mean Platelet Volume   12.2 H  7.5-10.5  fL


 


Nucleated Red Blood Cells   0.0   0.0-0.19  %


 


Sodium Level   147 H  136-145  mmol/L


 


Potassium Level   3.4 L  3.5-5.1  mmol/L


 


Chloride Level   112 H  101-111  mmol/L


 


Carbon Dioxide Level   29   21-32  mmol/L


 


Blood Urea Nitrogen   65 H  7-18  mg/dL


 


Creatinine   2.9 H  0.5-1.0  mg/dL


 


Glomerular Filtration Rate


Calc 


 


 17 


 


 >90  mL/min





 


Random Glucose   134 H    mg/dL


 


Total Calcium   7.9 L  8.5-10.1  mg/dL


 


Phosphorus Level   5.0 H  2.5-4.9  mg/dL


 


Magnesium Level


 


 


 2.20 


 


 1.80-2.40


mg/dL


 


Total Bilirubin   0.3   0.2-1.0  mg/dL


 


Aspartate Amino Transf


(AST/SGOT) 


 


 25 


 


 10-37  U/L





 


Alanine Aminotransferase


(ALT/SGPT) 


 


 26 


 


 12-78  U/L





 


Alkaline Phosphatase   97     U/L


 


Total Protein   4.8 L  6.0-8.3  g/dL


 


Albumin   1.4 L  3.5-5.0  g/dL


 


Immature Granulocyte % (Auto)    0.7  0-1  %


 


Neutrophils (%) (Auto)    73.6  40.0-77.0  %


 


Lymphocytes (%) (Auto)    12.3 L 21.0-51.0  %


 


Monocytes (%) (Auto)    8.5  3.0-13.0  %


 


Eosinophils (%) (Auto)    4.7  0.0-8.0  %


 


Basophils (%) (Auto)    0.2  0.0-5.0  %


 


Neutrophils # (Auto)    11.1 H 1.8-7.7  K/uL


 


Lymphocytes # (Auto)    1.9  1.0-4.8  K/uL


 


Monocytes # (Auto)    1.3 H 0.1-1.0  K/uL


 


Eosinophils # (Auto)    0.70  0.00-0.70  K/uL


 


Basophils # (Auto)    0.03  0.00-0.20  K/uL


 


Absolute Immature Granulocyte


(auto 


 


 


 0.10 


 0-1  K/uL

















ASSESSMENT:


Leukocytosis.  


Acute on chronic renal failure.  


Heart failure.  


Multifactorial Encephalopathy.


Diabetes mellitus.  


Iron-deficiency Anemia.  


Bilateral pleural effusion.


Anterior thecal sac compression on lumbar MRI.








PLAN:


Continue levofloxacin.  


Continue Meropenem. 


Continue GI prophylaxis.  


Patient will need to follow up with neurosurgeon as outpatient.


Continue pain management.


Continue monitoring glucose levels.


We will follow up on the cultures.





This case was reviewed and discussed with my supervising physician and the above

assessment and plan was formulated and agreed upon.


ATTESTATION BY PHYSICIAN


I have seen and examined the patient. I reviewed the documentation, medical 

decision making, and treatment plan as noted by the mid-level provider above. I 

agree with the findings and plan of care.











ISAAC JUÁREZ MD, MIRTA L [facility]             Dec 19, 2024 23:11

## 2024-12-19 NOTE — PN
CATAL[facility] PROGRESS NOTE








Date of Service: Dec 19, 2024 


Time of Service: 08:42





SUBJECTIVE:





12/15 Pt seen at bedside, no acute events overnight.  She is currently not 

needing pressors or intubation however she is somnolent with rigors suggesting 

severe infection.  Will continue with current care, broad spectrum antibiotics 

and ICU care.  Appreciate nephrology recommendations.  





12/16 the patient has been seen and examined earlier this morning during my 

rounding, no acute events overnight, she is alert oriented x3, hemodynamically 

stable, saturating normal on air.  Results of MRI thoracic and lumbar spine 

reviewed, discussed with the patient, questions answered.  WBC today trending 

up.





12/17 the patient has been seen and examined during my rounding, downgraded to [facility] medical floor, remains alert oriented x3, BP mildly elevated, 150/81, rest of

the vital signs are unremarkable.  WBC trending down, today 15.9, discussed with

the patient.  Findings of MRI lumbar spine reviewed, discussed with the patient,

all questions answered.





12/18 the patient has been seen and examined during my rounding, she remains 

comfortably in bed, hemodynamically stable, afebrile, WBC slowly trending down. 

Getting good pain control with current medical management, no family members at 

bedside during my visit.





12/19 patient is seen and examined, comfortably in bed, no acute events 

overnight, alert and oriented x3, following commands, hemodynamically stable, 

getting IV antibiotics.  The patient with persistent leukocytosis 14.0.  Denies 

chest pain, no shortness a breath, no nausea, no vomiting.  Patient with Matos 

catheter in place, we will remove today.





REVIEW OF SYSTEMS


12 point ROS negative unless noted in HPI





PHYSICAL EXAM


GENERAL APPEARANCE:  The patient is awake, alert, and oriented, in no acute 

cardiopulmonary distress.


NEUROLOGICAL:  Cranial nerves II-XII grossly intact.  Motor is 5/5 in bilateral 

upper and lower extremities proximal to distal.  No sensory deficits.


HEENT:  Face is symmetric. Pupils are equal and reactive.  Extraocular movements

are intact.


NECK:  Supple.  No JVD. No thyromegaly. No submental, submandibular, pre-

/postauricular, occipital or supraclavicular lymphadenopathy.


CHEST:  Normal chest expansion. No Telemetry.


LUNGS:  Absence of any rales, rhonchi or any wheezing.


CARDIOVASCULAR:  Regular.  S1 and S2 normal.  No appreciable rubs, murmurs or 

gallops. 


ABDOMEN:  Soft, nontender, and nondistended.  There is no rebound, voluntary 

guarding, or rigidity.


:  Deferred. No Matos.


EXTREMITIES:  Non-edematous and not cyanotic.  No clubbing.  Good capillary 

refill.


SKIN:  No skin breakdown.





                             Vital Signs (last 8hr)








  Date Time  Temp Pulse Resp B/P (MAP) Pulse Ox O2 Delivery O2 Flow Rate FiO2


 


12/19/24 06:30  80 18   N/A Room Air  21


 


12/19/24 06:30  77 18     


 


12/19/24 04:52 98.2 78 18 141/76 95 Room Air  











LABS:








                                   Laboratory:








Test


 12/19/24


05:46 12/19/24


04:39 12/18/24


16:48 12/18/24


05:20 Range/Units


 


 


Whole Blood Glucose 132 H      MG/DL


 


White Blood Count  14.0 H   4.8-10.8  K/uL


 


Red Blood Count


 


 3.40 L


 


 


 4.00-5.50


MIL/uL


 


Hemoglobin  10.4 L   12.0-16.0  g/dL


 


Hematocrit  32.5 L   36-48  %


 


Mean Corpuscular Volume  95.6    79-99  fL


 


Mean Corpuscular Hemoglobin  30.6    27.0-33.0  pg


 


Mean Corpuscular Hemoglobin


Concent 


 32.0 


 


 


 32.0-36.0  g/dL





 


Red Cell Distribution Width  14.7    11.0-15.5  %


 


Platelet Count  237    130-400  K/uL


 


Mean Platelet Volume  12.2 H   7.5-10.5  fL


 


Nucleated Red Blood Cells  0.0    0.0-0.19  %


 


Sodium Level  147 H   136-145  mmol/L


 


Potassium Level  3.4 L   3.5-5.1  mmol/L


 


Chloride Level  112 H   101-111  mmol/L


 


Carbon Dioxide Level  29    21-32  mmol/L


 


Blood Urea Nitrogen  65 H   7-18  mg/dL


 


Creatinine  2.9 H   0.5-1.0  mg/dL


 


Glomerular Filtration Rate


Calc 


 17 


 


 


 >90  mL/min





 


Random Glucose  134 H     mg/dL


 


Total Calcium  7.9 L   8.5-10.1  mg/dL


 


Phosphorus Level  5.0 H   2.5-4.9  mg/dL


 


Magnesium Level


 


 2.20 


 


 


 1.80-2.40


mg/dL


 


Total Bilirubin  0.3    0.2-1.0  mg/dL


 


Aspartate Amino Transf


(AST/SGOT) 


 25 


 


 


 10-37  U/L





 


Alanine Aminotransferase


(ALT/SGPT) 


 26 


 


 


 12-78  U/L





 


Alkaline Phosphatase  97      U/L


 


Total Protein  4.8 L   6.0-8.3  g/dL


 


Albumin  1.4 L   3.5-5.0  g/dL


 


Bedside Glucose Comment   Notified Nurse    


 


Immature Granulocyte % (Auto)    0.7  0-1  %


 


Neutrophils (%) (Auto)    73.6  40.0-77.0  %


 


Lymphocytes (%) (Auto)    12.3 L 21.0-51.0  %


 


Monocytes (%) (Auto)    8.5  3.0-13.0  %


 


Eosinophils (%) (Auto)    4.7  0.0-8.0  %


 


Basophils (%) (Auto)    0.2  0.0-5.0  %


 


Neutrophils # (Auto)    11.1 H 1.8-7.7  K/uL


 


Lymphocytes # (Auto)    1.9  1.0-4.8  K/uL


 


Monocytes # (Auto)    1.3 H 0.1-1.0  K/uL


 


Eosinophils # (Auto)    0.70  0.00-0.70  K/uL


 


Basophils # (Auto)    0.03  0.00-0.20  K/uL


 


Absolute Immature Granulocyte


(auto 


 


 


 0.10 


 0-1  K/uL














                               Current Medications








 Medications


  (Trade)  Dose


 Ordered  Sig/Chaim


 Route


 PRN Reason  Start Time


 Stop Time Status Last Admin


Dose Admin


 


 Acetaminophen


  (TYLenol 325MG


 TAB)  650 mg  Q6H  PRN


 PO


 TEMPERATURE GREATER THAN 101.5  12/14/24 23:00


 12/15/24 17:42 DC 12/15/24 17:35


650 MG


 


 Acetaminophen


  (TYLenol 325MG


 TAB)  650 mg  Q6H  PRN


 PO


 MILD PAIN (1-3)  12/15/24 17:30


 1/14/25 17:29  12/17/24 17:14


650 MG


 


 Acetaminophen/


 Codeine Phosphate


  (TYLenol-coDEINE


 TAB)  1 tab  Q6H  PRN


 PO


 MODERATE PAIN (4-6)  12/18/24 17:30


 1/17/25 17:29  12/18/24 17:48


1 TAB


 


 Albuterol


  (DUOneb)  1 UDVIAL  [id number]


 IH


   12/15/24 00:00


 1/14/25 00:00  12/19/24 06:29


1 UDVIAL


 


 Albuterol Sulfate


  (Proventil


 0.083% 2.5mg/3ml)  2.5MG  Q4H  PRN


 IH


 SHORTNESS OF BREATH  12/14/24 23:00


 1/13/25 22:59   





 


 Aspirin


  (Aspirin 81mg


 Chew Tab)  81 mg  DAILY


 PO


   12/18/24 09:00


 1/17/25 08:59  12/18/24 09:24


81 MG


 


 Atorvastatin


 Calcium


  (LIPItor 40MG)  40 mg  HS


 PO


   12/17/24 21:00


 1/16/25 20:59  12/18/24 21:29


40 MG


 


 Azithromycin  250 ml @ 


 250 mls/hr  Q24H  STAT


 IVPB


   12/14/24 20:16


 12/14/24 21:15 DC 12/14/24 20:42


250 MLS/HR


 


 Calcium Gluconate


 1 gm/Sodium


 Chloride  100 ml @ 0


 mls/hr  PROTOCOL


 IV


   12/14/24 22:00


 1/13/25 21:59   





 


 Calcium Gluconate


 1 gm/Sodium


 Chloride  100 ml @ 0


 mls/hr  PROTOCOL


 IV


   12/15/24 05:00


 12/15/24 04:51 DC  





 


 Carvedilol


  (Coreg 6.25MG)  6.25 mg  BIDMEALS


 PO


   12/14/24 22:00


 1/13/25 21:59  12/18/24 17:47


6.25 MG


 


 Ceftriaxone Sodium


  (ROCEphine 1G


 INJ)  1 gm  ONCE  STAT


 IVPB


   12/14/24 20:16


 12/14/24 20:18 DC 12/14/24 20:42


1 GM


 


 Clopidogrel


 Bisulfate


  (plaVIX 75MG)  75 mg  DAILY


 PO


   12/18/24 09:00


 1/17/25 08:59  12/18/24 09:23


75 MG


 


 Dextrose


  (D50w)  50 ml  AD  PRN


 IV


 HYPOGLYCEMIA PROTOCOL  12/14/24 21:30


 1/13/25 21:29   





 


 Doxycycline


 Hyclate  250 ml @ 


 125 mls/hr  Q12H


 IV


   12/15/24 12:00


 12/15/24 10:45 DC  





 


 Famotidine


  (Pepcid 20mg


 Vial)  20 mg  DAILY


 IV


   12/15/24 09:00


 1/14/25 08:59  12/18/24 09:24


20 MG


 


 Fluticasone


 Propionate


  (FLOnase 50 mcg/


 spray 16g bottle)  2 SPRAYS


 EACH


 NOST...  DAILY


 EN


   12/18/24 09:00


 1/17/25 08:59   





 


 Furosemide


  (LASix 20MG VIAL)  20 mg  Q8H


 IV


   12/14/24 21:30


 1/13/25 21:29  12/19/24 05:55


20 MG


 


 Gabapentin


  (NEURontin 100


 mg CAP)  100 mg  TID


 PO


   12/16/24 10:00


 1/15/25 09:59  12/18/24 21:29


100 MG


 


 Glucagon


  (Glucagon 1mg


 Kit)  1 mg  AD  PRN


 IM


 HYPOGLYCEMIA PROTOCOL  12/14/24 21:30


 1/13/25 21:29   





 


 Heparin Sodium


  (Porcine)


  (HEParin 5,000


 UNIT VIAL)  5,000 unit  BID


 SQ


   12/15/24 09:00


 1/14/25 08:59  12/18/24 21:20


5,000 UNIT


 


 Heparin Sodium


  (Porcine)


  (HEParin 5,000


 UNIT VIAL)  5,000 unit  BID


 SQ


   12/15/24 09:00


 12/14/24 23:12 DC  





 


 Home Med


  (Home Medication)  Cholecalciferol


 (Vitamin D3) 1


 CAP  DAILY


 PO


   12/18/24 09:00


 1/17/25 08:59   





 


 Hydralazine HCl


  (APRESOLine 20MG


 INJ)  10 mg  Q6H  PRN


 IV


 For:SBP above 160;DBP above 90  12/14/24 23:00


 1/13/25 22:59  12/18/24 12:43


10 MG


 


 Hydralazine HCl


  (IUQGNKSxbv27IG


 TAB)  25 mg  BID


 PO


   12/17/24 21:00


 12/19/24 08:29 DC 12/18/24 21:29


25 MG


 


 Hydralazine HCl


  ([id number]


 TAB)  50 mg  Q8H5


 PO


   12/19/24 08:30


 1/16/25 20:59   





 


 Hydromorphone HCl


  (DiLAUDid 1MG


 INJ)  1 mg  Q4H  PRN


 IVP


 SEVERE PAIN (7-10)  12/16/24 10:00


 12/21/24 09:59  12/18/24 22:45


1 MG


 


 Insulin Human


 Regular


  (humuLIN R 100


 UNIT/ML 3ML)  INSULIN


 SLIDING


 SCAL...  ACHS


 SQ


   12/16/24 11:30


 1/15/25 11:29  12/18/24 21:17


4 UNIT


 


 Insulin Human


 Regular


  (humuLIN R 100


 UNIT/ML 3ML)  INSULIN


 SLIDING


 SCAL...  Q6H6


 SQ


   12/15/24 00:00


 12/16/24 10:40 DC 12/15/24 18:22


3 UNIT


 


 Levofloxacin/


 Dextrose  50 ml @ 50


 mls/hr  Q48H


 IVPB


   12/15/24 11:00


 12/25/24 10:59  12/17/24 09:53


50 MLS/HR


 


 Levofloxacin/


 Dextrose  100 ml @ 


 100 mls/hr  Q24H


 IV


   12/15/24 11:00


 12/15/24 10:53 DC  





 


 Lorazepam


  (AtiVAN)  1 mg  ONCE  STAT


 IVP


   12/14/24 17:39


 12/14/24 17:40 DC 12/14/24 17:46


1 MG


 


 Magnesium Sulfate  50 ml @ 0


 mls/hr  PROTOCOL


 IV


   12/18/24 09:00


 1/17/25 08:59  12/18/24 09:25


25 MLS/HR


 


 Meropenem


  (Merrem)  500 mg  Q12H


 IVPB


   12/15/24 11:00


 12/25/24 10:59  12/18/24 23:09


500 MG


 


 Meropenem 500 mg/


 Sodium Chloride  100 ml @ 


 33.333 mls/


 hr  Q12H


 IV


   12/15/24 10:30


 12/15/24 10:48 DC  





 


 Methylprednisolone


 Sodium Succinate


  (Solu-medROL


 40MG)  40 mg  BID


 IVP


   12/15/24 09:00


 12/15/24 10:45 DC 12/15/24 09:36


40 MG


 


 Montelukast Sodium


  (SinguLAIR)  10 mg  HS


 PO


   12/17/24 21:00


 1/16/25 20:59  12/18/24 21:29


10 MG


 


 Ondansetron HCl


  (zoFRAN 4MG INJ)  4 mg  Q6H  PRN


 IV


 NAUSEA/VOMITING  12/14/24 23:00


 1/13/25 22:59  12/18/24 12:42


4 MG


 


 Pantoprazole


 Sodium


  (PROTonix 40MG


 TAB)  40 mg  DAILY


 PO


   12/18/24 09:00


 1/17/25 08:59  12/18/24 09:24


40 MG


 


 Piperacillin Sod/


 Tazobactam Sod


  (Zosyn


 3.375gm+NS 50ml)  3.375 gm  Q12H


 IVPB


   12/14/24 21:00


 12/15/24 10:45 DC 12/15/24 09:36


3.375 GM


 


 Sodium Chloride  1,000 ml @ 


 500 mls/hr  Q2H STAT


 IV


   12/14/24 20:04


 12/14/24 22:03 DC 12/14/24 20:40


500 MLS/HR


 


 Sodium Chloride


  (NS 50ml)  50 ml  AD


 IV


   12/15/24 00:00


 12/14/24 21:15 DC  





 


 Vancomycin HCl  250 ml @ 


 125 mls/hr  Q48H


 IV


   12/17/24 11:30


 12/17/24 11:01 DC  





 


 Vancomycin HCl


  (Vancomycin


 Protocol)  1 each  AD


 IV


   12/15/24 10:30


 12/17/24 11:03 DC  





 


 Vitamin B Complex/


 Vit C/Folic Acid


  (Nephrovite


 Tablet)  1 cap  DAILY


 PO


   12/18/24 09:00


 1/17/25 08:59  12/18/24 09:24


1 CAP











DIAGNOSTICS / RADIOLOGY:


[ ]





ASSESSMENT:





Metabolic encephalopathy


Sepsis without shock


Pneumonia


Chronic renal failure. 


Hypokalemia, POA


Diabetes mellitus type 2 last A1C 9.7


Hypertension


Obesity BMI 31.7


PAD both lower extremities


Right pleural effusion with mild interstitial fibrosis


Possible infectious process involving L1-L2.





PLAN:





- patient remains admitted to the medical floor


- MRI lumbar spine showing degenerative changes.  Discussed with the patient


- discussed with the Neurosurgery, no intervention required, can follow up as an

outpatient


- continue to follow infectious disease input recommendation


-continue current IV antibiotics, continue to trend WBC in a.m.


- continue closely monitoring patient's blood pressure, add hydralazine 25 mg 

p.o. b.i.d.


- continue to follow Nephrology input and recommendation


-a.m. labs





Disposition:  Remains admitted to the medical floor, WBC today at 14.0, we will 

remove Matos catheter today, follow ID input recommendation, Hematology 

consultation requested, follow input and recommendation.  Continue to follow 

a.m. labs.  We will request PT to evaluate the patient.  Anticipate discharge 

home in the next 24 hours if medically stable.  Discussed with the patient, in 

agreement.





Plan of action discussed with the patient, all questions answered, agreed and 

understood the information provided.











BENJAMÍN MEJIA MD           Dec 19, 2024 08:42

## 2024-12-19 NOTE — NUR
PT claudette completed. Pt in severe pain at rest to BLE. Pt reports pain is new and severe this 
admission. Pt tearful and dryheaving. C/O nausea. Nurse aware. Pt assisted to BSC, toileted 
and back to bed with daughter present. Pt and daughter instructed to call for help with 
transfer and not to take self to RR . Daughter reports that "the nurse told me to go slow 
and take her to the RR myself". PT and daughter voiced that they understand and will call 
for  help. Pt did NOT WALK with PT due to severe BLE pain and intractable nausea.

## 2024-12-19 NOTE — CONS
CONSULT NOTE:


 A 69-year-old female with history of CHF, diabetes 


mellitus, dyslipidemia, who presented to the hospital with altered mental 


status.  The patient also found with some shortness of breath.  The patient 


admitted to ICU for further care.  Procalcitonin is negative.  WBC elevated at 


18,000.  The patient was started on vancomycin and levofloxacin  The patient was


discharged from this facility two days ago.  During the previous admission, she 


was found to have pneumonia and cellulitis and acute on chronic renal failure.





I was consulted for leukocytosis and anemia.





There is no obvious bleeding.





PAST MEDICAL HISTORY:


   * Diabetes mellitus.


   * Dyslipidemia.


   * Chronic kidney disease.


   * Peripheral vascular disease.


   * CHF.





PAST SURGICAL HISTORY:


   * Femoropopliteal bypass.


   * Back surgery.


   * Nerve stimulator implant.





ALLERGIES:


   * DOXYCYCLINE.


   * IODINE.





CURRENT MEDICATIONS:  Reviewed.





SOCIAL HISTORY:  No alcohol, tobacco or illicit drug use.





FAMILY HISTORY:  Positive for diabetes mellitus.





REVIEW OF SYSTEMS:  Greater than 10 systems were reviewed, negative except as 


documented above.





PHYSICAL EXAMINATION:


GENERAL:  Elderly female, awake, slightly confused.


VITAL SIGNS:  Temperature 98.4, respiratory 23, blood pressure 158/85.


EYES:  No icterus.  Pupils are equal and reactive.


HENT:  No oral thrush seen.  Moist oral mucosa.


NECK:  Supple.  No JVD or thyromegaly.


LUNGS:  Good air entry.  No rales, no rhonchi.


CARDIOVASCULAR:  S1, S2 regular.  No murmur heard.


ABDOMEN:  Full.  Soft.  Nontender.  Bowel sound is present.


CENTRAL NERVOUS SYSTEM:  Awake, alert.  No focal deficits.


SKIN:  No rashes.  No itchiness.


LYMPHATIC:  No peripheral lymphadenopathy.


BACK:  No deformity.  No pressure ulcer.


MUSCULOSKELETAL:  No joint swelling, erythema or tenderness.





LABORATORY DATA:  Procalcitonin negative.  Sodium 147, potassium 3.4, BUN 63, 


creatinine 3.8.  WBC 18.5, hemoglobin 10.2, platelet 254.  Urine toxicology 


negative.  Blood culture, no growth for one day.





RADIOLOGY:  CT of the thoracic spine unremarkable.





ASSESSMENT:  





1.  Anemia





2.  Leukocytosis





3.  Congestive heart failure





4.  Hyperlipidemia





5.  Hyperlipidemia





6.  Diabetes mellitus





7.  Encephalopathy





Plan





1.  Peripheral blood smear showed red blood cells to be microcytic normochromic.

 There was no fragment cell or schistocyte.  There is no teardrop cell.  There 

is no rouleaux phenomena.  There is no pelger-Huet cell.  White blood cell with 

no blasts.  Platelet was normal in morphology and count.  There is increased 

number of the neutrophil and band consistent with infection.  There is also 

vacuolation confirms the diagnosis of sepsis





2.  There was hypersegmented neutrophils.  This patient to be startedLAB RESULTS


12/19/24 17:07: Whole Blood Glucose 198H


12/19/24 11:07: Bedside Glucose Comment Notified Nurse


12/19/24 04:39: 


White Blood Count 14.0H, Red Blood Count 3.40L, Hemoglobin 10.4L, Hematocrit 

32.5L, Mean Corpuscular Volume 95.6, Mean Corpuscular Hemoglobin 30.6, Mean 

Corpuscular Hemoglobin Concent 32.0, Red Cell Distribution Width 14.7, Platelet 

Count 237, Mean Platelet Volume 12.2H, Nucleated Red Blood Cells 0.0, Sodium 

Level 147H, Potassium Level 3.4L, Chloride Level 112H, Carbon Dioxide Level 29, 

Blood Urea Nitrogen 65H, Creatinine 2.9H, Glomerular Filtration Rate Calc 17, 

Random Glucose 134H, Total Calcium 7.9L, Phosphorus Level 5.0H, Magnesium Level 

2.20, Total Bilirubin 0.3, Aspartate Amino Transf (AST/SGOT) 25, Alanine 

Aminotransferase (ALT/SGPT) 26, Alkaline Phosphatase 97, Total Protein 4.8L, 

Albumin 1.4L


12/18/24 05:20: 


Immature Granulocyte % (Auto) 0.7, Neutrophils (%) (Auto) 73.6, Lymphocytes (%) 

(Auto) 12.3L, Monocytes (%) (Auto) 8.5, Eosinophils (%) (Auto) 4.7, Basophils 

(%) (Auto) 0.2, Neutrophils # (Auto) 11.1H, Lymphocytes # (Auto) 1.9, Monocytes 

# (Auto) 1.3H, Eosinophils # (Auto) 0.70, Basophils # (Auto) 0.03, Absolute 

Immature Granulocyte (auto 0.10


 on folic acid 1 mg p.o. daily and vitamin B12 1000 mcg p.o. daily.





3.  No need for bone marrow biopsy





4.  Patient actually with iron deficiency anemia.  The patient could benefit 

from IV iron.  While the patient in the hospital.





5.  Continue antibiotic treatment as per infectious disease specialist








Laboratory Tests








Test


 12/18/24


19:46 12/19/24


04:39 12/19/24


05:46 12/19/24


11:07


 


Whole Blood Glucose


 230 MG/DL


()  H 


 132 MG/DL


()  H 165 MG/DL


()  H


 


White Blood Count


 


 14.0 K/uL


(4.8-10.8)  H 


 





 


Red Blood Count


 


 3.40 MIL/uL


(4.00-5.50)  L 


 





 


Hemoglobin


 


 10.4 g/dL


(12.0-16.0)  L 


 





 


Hematocrit


 


 32.5 % (36-48)


L 


 





 


Mean Corpuscular Volume


 


 95.6 fL


(79-99) 


 





 


Mean Corpuscular Hemoglobin


 


 30.6 pg


(27.0-33.0) 


 





 


Mean Corpuscular Hemoglobin


Concent 


 32.0 g/dL


(32.0-36.0) 


 





 


Red Cell Distribution Width


 


 14.7 %


(11.0-15.5) 


 





 


Platelet Count


 


 237 K/uL


(130-400) 


 





 


Mean Platelet Volume


 


 12.2 fL


(7.5-10.5)  H 


 





 


Nucleated Red Blood Cells


 


 0.0 %


(0.0-0.19) 


 





 


Sodium Level


 


 147 mmol/L


(136-145)  H 


 





 


Potassium Level


 


 3.4 mmol/L


(3.5-5.1)  L 


 





 


Chloride Level


 


 112 mmol/L


(101-111)  H 


 





 


Carbon Dioxide Level


 


 29 mmol/L


(21-32) 


 





 


Blood Urea Nitrogen


 


 65 mg/dL


(7-18)  H 


 





 


Creatinine


 


 2.9 mg/dL


(0.5-1.0)  H 


 





 


Glomerular Filtration Rate


Calc 


 17 mL/min


(>90) 


 





 


Random Glucose


 


 134 mg/dL


()  H 


 





 


Total Calcium


 


 7.9 mg/dL


(8.5-10.1)  L 


 





 


Phosphorus Level


 


 5.0 mg/dL


(2.5-4.9)  H 


 





 


Magnesium Level


 


 2.20 mg/dL


(1.80-2.40) 


 





 


Total Bilirubin


 


 0.3 mg/dL


(0.2-1.0) 


 





 


Aspartate Amino Transf


(AST/SGOT) 


 25 U/L (10-37)


 


 





 


Alanine Aminotransferase


(ALT/SGPT) 


 26 U/L (12-78)


 


 





 


Alkaline Phosphatase


 


 97 U/L


() 


 





 


Total Protein


 


 4.8 g/dL


(6.0-8.3)  L 


 





 


Albumin


 


 1.4 g/dL


(3.5-5.0)  L 


 





 


Bedside Glucose Comment


 


 


 


 Notified Nurse





 


Test


 12/19/24


17:07 


 


 





 


Whole Blood Glucose


 198 MG/DL


()  H 


 


 




















JOSE BALES MD              Dec 19, 2024 18:57

## 2024-12-19 NOTE — PN
NEPHROLOGY PROGRESS NOTE








Date/Time Patient Seen: Dec 19, 2024





SUBJECTIVE:


This is a 69 year old female with a past medical history of congestive heart 

failure history, hypertension, hyperlipidemia and chronic low back pain. 


She presented to the emergency room with complaints of altered mental status.


Patient has a recent hospital admission for similar reason and was discharged on

12/11/2024


Blood culture has been negative 


She continues on broad-spectrum antibiotics, including vancomycin.


Imaging studies were noted.


Neurosurgery has been consulted for possible lumbar spines osteomyelitis


She was noted to have elevated BUN/creatinine.  


We are consulted for renal failure.  


Renal function is improving 


Electrolytes show hypokalemia 


Hemoglobin is stable at 10.5 


Iron panel was noted. 


She was seen in the medical floor,  in no acute distress


She is complaining of mouth sores.


Condition is critical and guarded





REVIEW OF SYSTEMS:


GENERAL:  Positive for generalized weakness


NEUROLOGIC: Negative for any blurry vision, blind spots, double vision, facial 

asymmetry, dysphagia, dysarthria, hemiparesis, hemisensory deficits, vertigo, 

ataxia.


HEENT: Negative for any head trauma, neck trauma, neck stiffness, photophobia, 

phonophobia, sinusitis, rhinitis.


CARDIAC: Negative for any chest pain, dyspnea on exertion, paroxysmal nocturnal 

dyspnea, peripheral edema.


PULMONARY: Negative for any shortness of breath, wheezing, COPD, or TB exposure.


GASTROINTESTINAL: Negative for any abdominal pain, nausea, vomiting, bright red 

blood per rectum, melena.


GENITOURINARY: Negative for any dysuria, hematuria, incontinence.


INTEGUMENTARY: Negative for any rashes, cuts, insect bites.


RHEUMATOLOGIC: Negative for any joint pains, photosensitive rashes, history of v

asculitis or kidney problems.


HEMATOLOGIC: Negative for any abnormal bruising, frequent infections or bleed

ing.





                             Vital Signs (last 8hr)








  Date Time  Temp Pulse Resp B/P (MAP) Pulse Ox O2 Delivery O2 Flow Rate FiO2


 


12/16/24 12:03  82 16     


 


12/16/24 12:00 98.4 99 23 158/85 94 Room Air  


 


12/16/24 11:00  83 10 170/86 93 Room Air  


 


12/16/24 10:00  82 12 174/89 96 Room Air  


 


12/16/24 09:00  93 15 163/79 94 Room Air  


 


12/16/24 08:16    164/68    


 


12/16/24 08:00     95 Room Air* 0 21


 


12/16/24 08:00  97 17 161/94 93 Room Air  


 


12/16/24 07:00 97.9 93 15 164/68 90 Nasal Cannula 2.0 


 


12/16/24 06:38  92 16     


 


12/16/24 06:38  92 16   N/Cannula Low lpm 2.0 28








PHYSICAL EXAM:


GENERAL: Alert and oriented x 3. No acute distress. Well-nourished.


EYES: EOMI. Anicteric.


HENT: Moist mucous membranes. No scleral icterus. No cervical lymphadenopathy.


LUNGS: Clear to auscultation bilaterally. No accessory muscle use.


CARDIOVASCULAR: Regular rate and rhythm. No murmur. No JVD.


ABDOMEN: Soft, non-tender and non-distended. No palpable masses.


EXTREMITIES: No edema. Non-tender.


SKIN: No rashes or lesions. Warm.


NEUROLOGIC: No focal neurological deficits. CN II-XII grossly intact, but not 

individually tested.


PSYCHIATRIC: Cooperative. Appropriate mood and affect.








                               Current Medications








 Medications


  (Trade)  Dose


 Ordered  Sig/Chaim


 Route


 PRN Reason  Start Time


 Stop Time Status Last Admin


Dose Admin


 


 Acetaminophen


  (TYLenol 325MG


 TAB)  650 mg  Q6H  PRN


 PO


 TEMPERATURE GREATER THAN 101.5  12/14/24 23:00


 12/15/24 17:42 DC 12/15/24 17:35


650 MG


 


 Acetaminophen


  (TYLenol 325MG


 TAB)  650 mg  Q6H  PRN


 PO


 MILD PAIN (1-3)  12/15/24 17:30


 1/14/25 17:29  12/16/24 08:15


650 MG


 


 Albuterol


  (DUOneb)  1 UDVIAL  Q8BRYPS


 IH


   12/15/24 00:00


 1/14/25 00:00  12/16/24 12:02


1 UDVIAL


 


 Albuterol Sulfate


  (Proventil


 0.083% 2.5mg/3ml)  2.5MG  Q4H  PRN


 IH


 SHORTNESS OF BREATH  12/14/24 23:00


 1/13/25 22:59   





 


 Azithromycin  250 ml @ 


 250 mls/hr  Q24H  STAT


 IVPB


   12/14/24 20:16


 12/14/24 21:15 DC 12/14/24 20:42


250 MLS/HR


 


 Calcium Gluconate


 1 gm/Sodium


 Chloride  100 ml @ 0


 mls/hr  PROTOCOL


 IV


   12/14/24 22:00


 1/13/25 21:59   





 


 Calcium Gluconate


 1 gm/Sodium


 Chloride  100 ml @ 0


 mls/hr  PROTOCOL


 IV


   12/15/24 05:00


 12/15/24 04:51 DC  





 


 Carvedilol


  (Coreg 6.25MG)  6.25 mg  BIDMEALS


 PO


   12/14/24 22:00


 1/13/25 21:59  12/16/24 08:16


6.25 MG


 


 Ceftriaxone Sodium


  (ROCEphine 1G


 INJ)  1 gm  ONCE  STAT


 IVPB


   12/14/24 20:16


 12/14/24 20:18 DC 12/14/24 20:42


1 GM


 


 Dextrose


  (D50w)  50 ml  AD  PRN


 IV


 HYPOGLYCEMIA PROTOCOL  12/14/24 21:30


 1/13/25 21:29   





 


 Doxycycline


 Hyclate  250 ml @ 


 125 mls/hr  Q12H


 IV


   12/15/24 12:00


 12/15/24 10:45 DC  





 


 Famotidine


  (Pepcid 20mg


 Vial)  20 mg  DAILY


 IV


   12/15/24 09:00


 1/14/25 08:59  12/16/24 08:16


20 MG


 


 Furosemide


  (LASix 20MG VIAL)  20 mg  Q8H


 IV


   12/14/24 21:30


 1/13/25 21:29  12/16/24 05:44


20 MG


 


 Gabapentin


  (NEURontin 100


 mg CAP)  100 mg  TID


 PO


   12/16/24 10:00


 1/15/25 09:59  12/16/24 10:11


100 MG


 


 Glucagon


  (Glucagon 1mg


 Kit)  1 mg  AD  PRN


 IM


 HYPOGLYCEMIA PROTOCOL  12/14/24 21:30


 1/13/25 21:29   





 


 Heparin Sodium


  (Porcine)


  (HEParin 5,000


 UNIT VIAL)  5,000 unit  BID


 SQ


   12/15/24 09:00


 1/14/25 08:59  12/16/24 08:18


5,000 UNIT


 


 Heparin Sodium


  (Porcine)


  (HEParin 5,000


 UNIT VIAL)  5,000 unit  BID


 SQ


   12/15/24 09:00


 12/14/24 23:12 DC  





 


 Hydralazine HCl


  (APRESOLine 20MG


 INJ)  10 mg  Q6H  PRN


 IV


 For:SBP above 160;DBP above 90  12/14/24 23:00


 1/13/25 22:59  12/16/24 10:12


10 MG


 


 Hydromorphone HCl


  (DiLAUDid 1MG


 INJ)  1 mg  Q4H  PRN


 IVP


 SEVERE PAIN (7-10)  12/16/24 10:00


 12/21/24 09:59  12/16/24 10:43


1 MG


 


 Insulin Human


 Regular


  (humuLIN R 100


 UNIT/ML 3ML)  INSULIN


 SLIDING


 SCAL...  ACHS


 SQ


   12/16/24 11:30


 1/15/25 11:29   





 


 Insulin Human


 Regular


  (humuLIN R 100


 UNIT/ML 3ML)  INSULIN


 SLIDING


 SCAL...  Q6H6


 SQ


   12/15/24 00:00


 12/16/24 10:40 DC 12/15/24 18:22


3 UNIT


 


 Levofloxacin/


 Dextrose  50 ml @ 50


 mls/hr  Q48H


 IVPB


   12/15/24 11:00


 12/25/24 10:59  12/15/24 12:40


50 MLS/HR


 


 Levofloxacin/


 Dextrose  100 ml @ 


 100 mls/hr  Q24H


 IV


   12/15/24 11:00


 12/15/24 10:53 DC  





 


 Lorazepam


  (AtiVAN)  1 mg  ONCE  STAT


 IVP


   12/14/24 17:39


 12/14/24 17:40 DC 12/14/24 17:46


1 MG


 


 Meropenem


  (Merrem)  500 mg  Q12H


 IVPB


   12/15/24 11:00


 12/25/24 10:59  12/16/24 10:11


500 MG


 


 Meropenem 500 mg/


 Sodium Chloride  100 ml @ 


 33.333 mls/


 hr  Q12H


 IV


   12/15/24 10:30


 12/15/24 10:48 DC  





 


 Methylprednisolone


 Sodium Succinate


  (Solu-medROL


 40MG)  40 mg  BID


 IVP


   12/15/24 09:00


 12/15/24 10:45 DC 12/15/24 09:36


40 MG


 


 Ondansetron HCl


  (zoFRAN 4MG INJ)  4 mg  Q6H  PRN


 IV


 NAUSEA/VOMITING  12/14/24 23:00


 1/13/25 22:59  12/16/24 11:55


4 MG


 


 Piperacillin Sod/


 Tazobactam Sod


  (Zosyn


 3.375gm+NS 50ml)  3.375 gm  Q12H


 IVPB


   12/14/24 21:00


 12/15/24 10:45 DC 12/15/24 09:36


3.375 GM


 


 Sodium Chloride  1,000 ml @ 


 500 mls/hr  Q2H STAT


 IV


   12/14/24 20:04


 12/14/24 22:03 DC 12/14/24 20:40


500 MLS/HR


 


 Sodium Chloride


  (NS 50ml)  50 ml  AD


 IV


   12/15/24 00:00


 12/14/24 21:15 DC  





 


 Vancomycin HCl  250 ml @ 


 125 mls/hr  Q48H


 IV


   12/17/24 11:30


 12/27/24 11:29   





 


 Vancomycin HCl


  (Vancomycin


 Protocol)  1 each  AD


 IV


   12/15/24 10:30


 12/29/24 10:29   














LABORATORY:


[ ]








                                Hematology Labs:








Test


 12/19/24


04:39 12/18/24


05:20 Range/Units


 


 


White Blood Count 14.0 H  4.8-10.8  K/uL


 


Red Blood Count


 3.40 L


 


 4.00-5.50


MIL/uL


 


Hemoglobin 10.4 L  12.0-16.0  g/dL


 


Hematocrit 32.5 L  36-48  %


 


Mean Corpuscular Volume 95.6   79-99  fL


 


Mean Corpuscular Hemoglobin 30.6   27.0-33.0  pg


 


Mean Corpuscular Hemoglobin


Concent 32.0 


 


 32.0-36.0  g/dL





 


Red Cell Distribution Width 14.7   11.0-15.5  %


 


Platelet Count 237   130-400  K/uL


 


Mean Platelet Volume 12.2 H  7.5-10.5  fL


 


Nucleated Red Blood Cells 0.0   0.0-0.19  %


 


Immature Granulocyte % (Auto)  0.7  0-1  %


 


Neutrophils (%) (Auto)  73.6  40.0-77.0  %


 


Lymphocytes (%) (Auto)  12.3 L 21.0-51.0  %


 


Monocytes (%) (Auto)  8.5  3.0-13.0  %


 


Eosinophils (%) (Auto)  4.7  0.0-8.0  %


 


Basophils (%) (Auto)  0.2  0.0-5.0  %


 


Neutrophils # (Auto)  11.1 H 1.8-7.7  K/uL


 


Lymphocytes # (Auto)  1.9  1.0-4.8  K/uL


 


Monocytes # (Auto)  1.3 H 0.1-1.0  K/uL


 


Eosinophils # (Auto)  0.70  0.00-0.70  K/uL


 


Basophils # (Auto)  0.03  0.00-0.20  K/uL


 


Absolute Immature Granulocyte


(auto 


 0.10 


 0-1  K/uL











                                 Chemistry Labs:








Test


 12/19/24


11:07 12/19/24


04:39 Range/Units


 


 


Whole Blood Glucose 165 H    MG/DL


 


Bedside Glucose Comment Notified Nurse    


 


Sodium Level  147 H 136-145  mmol/L


 


Potassium Level  3.4 L 3.5-5.1  mmol/L


 


Chloride Level  112 H 101-111  mmol/L


 


Carbon Dioxide Level  29  21-32  mmol/L


 


Blood Urea Nitrogen  65 H 7-18  mg/dL


 


Creatinine  2.9 H 0.5-1.0  mg/dL


 


Glomerular Filtration Rate


Calc 


 17 


 >90  mL/min





 


Random Glucose  134 H   mg/dL


 


Total Calcium  7.9 L 8.5-10.1  mg/dL


 


Phosphorus Level  5.0 H 2.5-4.9  mg/dL


 


Magnesium Level


 


 2.20 


 1.80-2.40


mg/dL


 


Total Bilirubin  0.3  0.2-1.0  mg/dL


 


Aspartate Amino Transf


(AST/SGOT) 


 25 


 10-37  U/L





 


Alanine Aminotransferase


(ALT/SGPT) 


 26 


 12-78  U/L





 


Alkaline Phosphatase  97    U/L


 


Total Protein  4.8 L 6.0-8.3  g/dL


 


Albumin  1.4 L 3.5-5.0  g/dL











DIAGNOSTICS / RADIOLOGY:


REASON: AMS


ORDERING PHYSICIAN: JOHN PAUL CONDE


PROCEDURE: BRAIN WO  -  MR BRAIN WO CON





MR BRAIN WO CON





HISTORY: Altered mental status





COMPARISON: None





TECHNIQUE: MRI of the brain was performed utilizing multiple pulse


sequences in axial, coronal and sagittal planes. Patient was not given


contrast through intravenous route.





FINDINGS: The ventricles and extraventricular CSF spaces are dilated


consistent with cerebral atrophy. Nonspecific white matter changes are


seen. There is no midline shift, mass effect or herniation. No subacute


hemorrhage is seen. No MR evidence of acute infarct is seen in the


diffusion weighted images. Cerebellar tonsils are in normal position.


There are bilateral ethmoid and sphenoid sinusitis with mucoperiosteal


thickening.  No MR evidence of a mass lesion is seen in this noncontrast


study.





IMPRESSION: 


1. No MR evidence of acute infarct is seen in the diffusion weighted


images. Atrophy with white matter changes.








DICTATED BY: SHAQ ZUNIGA MD


DATE: 12/16/24 8508


REASON: PNEUMONIA


ORDERING PHYSICIAN: ISAAC JUÁREZ MD


PROCEDURE: CHEST WO  -  CT CHEST W/O CONTRAST





CT CHEST W/O CONTRAST





HISTORY:   Pneumonia





COMPARISON: None





TECHNIQUE: Multiple sequential axial images of the chest were obtained


from the thoracic inlet through upper abdomen. Patient was not given


contrast through intravenous route.





FINDINGS: There are bilateral pleural effusions with compressive


atelectasis. Coronary arterial calcifications are seen. Gallbladder is


distended. There is mild anasarca.  No pleural effusion or pericardial


effusion is seen. There is no evidence of pneumothorax. There are normal


size mediastinal and hilar lymph nodes. The heart is not enlarged.


Degenerative changes of the thoracolumbar spine are present.  There is


no evidence of adrenal nodule.





IMPRESSION: 


1. Mild to moderate bilateral pleural effusions with compressive


atelectasis.














CT was performed with one or more following dose reduction techniques:


automated exposure control, adjustment of the mA and kv according to


patient's size, or use of a iterative reconstruction technique.








DICTATED BY: SHAQ ZUNIGA MD


DATE: 12/16/24 6867





REASON: Rule out ostemyelitis


ORDERING PHYSICIAN: TANNER PERRY CNP


PROCEDURE: L SPN WO  -  MR SPINAL CANAL, LUMBAR WO CON





MR SPINAL CANAL, LUMBAR WO CON





HISTORY: Pain





COMPARISON: CT from 12/15/2024





TECHNIQUE: MRI of the lumbar spine was performed utilizing multiple


pulse sequences in axial and sagittal plane. Patient was not given


contrast through intravenous route. 





FINDINGS: Endplate degenerative changes with disc space narrowing are


seen predominantly involving the L1-L2 level. Orthopedic fixation plates


and screws are seen traversing the T12 and L1 with paramagnetic


susceptibility artifacts limiting evaluation. There is disc noted at the


lumbosacral junction and for numbering purposes only, this level will be


designated as S1-S2. No abnormal signal intensity is seen of the


visualized bony structure.  No loss of vertebral height is seen.  There


is straightening of normal lumbar curvature which may be related to


muscle spasm or positioning. Degenerative disc signals are present at


all lumbar spine levels. Visualized distal conus is unremarkable.





At the L2-3 level,  there is spondylotic with central disc


protrusion/herniation with bilateral ligamentum flavum hypertrophy


causing anterior thecal sac compression with bilateral lateral recess


stenosis and bilateral neural foraminal stenosis. The thecal sac


measures approximately 7 mm in its anterior posterior dimension.





At the L3-4 level,  there is spondylotic disc with bilateral ligamentum


flavum hypertrophy causing anterior thecal sac compression with


bilateral lateral recess stenosis and bilateral neural foraminal


stenosis. The thecal sac measures approximately 6.2 mm in its anterior


posterior dimension.





At the L4-5 level,  there is spondylotic disc with bilateral ligamentum


flavum hypertrophy causing anterior thecal sac compression with


bilateral lateral recess stenosis and bilateral neural foraminal


stenosis. The thecal sac measures approximately 8.4 mm in its anterior


posterior dimension.





At the L5-S1 level,  there is spondylotic disc with bilateral ligamentum


flavum hypertrophy and facet hypertrophy causing anterior thecal sac


compression with bilateral lateral recess stenosis and bilateral neural


foraminal stenosis. The thecal sac measures approximately 8.2 mm in its


anterior posterior dimension.





IMPRESSION:


1. DJD with lumbar spine spondylosis as described above. Evaluation at


T12 and L1 are limited due to postop artifacts.











DICTATED BY: SHAQ ZUNIGA MD


DATE: 12/16/24 7990





REASON: sepsis, backpain, ruling out device infection


ORDERING PHYSICIAN: TANNER PERRY CNP


PROCEDURE: T SPINE WO  -  CT THORACIC SPINE W/O CONTRAST





CT THORACIC SPINE W/O CONTRAST, CT LUMBAR SPINE W/O CONTRAST





HISTORY: sepsis, back pain, ruling out device infection





TECHNIQUE: CT THORACIC SPINE W/O CONTRAST, CT LUMBAR SPINE W/O CONTRAST.


Coronal and sagittal reformats were obtained. CT was performed with one


or more of the following dose reduction techniques: Automated exposure


control, adjustment of the mA and/or kV according to the patient's size,


or use of the iterative reconstruction technique.








FINDINGS: 


Evaluation of the cord and discs is limited with CT. 


No evidence of compression fracture or dislocation.  


There is diffuse osteopenia degraded evaluation of the study.


Multilevel degenerative changes are noted. Postop changes of posterior


fusion are seen at L1-L2 on the right with pedicle screws and fixation


bar. Advanced degenerative changes are seen at L2-L3 with disc space


loss, endplate osteophytes and endplate sclerosis/lucencies, underlying


infection is not excluded. Correlate clinically. Stimulator leads are


seen in the lower thoracic spine.


Small left and moderate right pleural effusion with bilateral lower lobe


consolidation.


The aorta is normal in caliber. No acute findings in the visualized


abdomen.  The visualized lungs are clear. 








IMPRESSION: 


There is diffuse osteopenia degraded evaluation of the study.


Multilevel degenerative changes are noted. Postop changes of posterior


fusion are seen at L1-L2 on the right with pedicle screws and fixation


bar. Advanced degenerative changes are seen at L2-L3 with disc space


loss, endplate osteophytes and endplate sclerosis/lucencies, underlying


infection is not excluded. Correlate clinically. Stimulator leads are


seen in the lower thoracic spine.


Small left and moderate right pleural effusion with bilateral lower lobe


consolidation..




















DICTATED BY: TUCKER RAYO MD


DATE: 12/15/24 1245





REASON: sepsis, backpain, ruling out device infection


ORDERING PHYSICIAN: TANNER PERRY CNP


PROCEDURE: L SPIN WO  -  CT LUMBAR SPINE W/O CONTRAST





CT THORACIC SPINE W/O CONTRAST, CT LUMBAR SPINE W/O CONTRAST





HISTORY: sepsis, back pain, ruling out device infection





TECHNIQUE: CT THORACIC SPINE W/O CONTRAST, CT LUMBAR SPINE W/O CONTRAST.


Coronal and sagittal reformats were obtained. CT was performed with one


or more of the following dose reduction techniques: Automated exposure


control, adjustment of the mA and/or kV according to the patient's size,


or use of the iterative reconstruction technique.








FINDINGS: 


Evaluation of the cord and discs is limited with CT. 


No evidence of compression fracture or dislocation.  


There is diffuse osteopenia degraded evaluation of the study.


Multilevel degenerative changes are noted. Postop changes of posterior


fusion are seen at L1-L2 on the right with pedicle screws and fixation


bar. Advanced degenerative changes are seen at L2-L3 with disc space


loss, endplate osteophytes and endplate sclerosis/lucencies, underlying


infection is not excluded. Correlate clinically. Stimulator leads are


seen in the lower thoracic spine.


Small left and moderate right pleural effusion with bilateral lower lobe


consolidation.


The aorta is normal in caliber. No acute findings in the visualized


abdomen.  The visualized lungs are clear. 








IMPRESSION: 


There is diffuse osteopenia degraded evaluation of the study.


Multilevel degenerative changes are noted. Postop changes of posterior


fusion are seen at L1-L2 on the right with pedicle screws and fixation


bar. Advanced degenerative changes are seen at L2-L3 with disc space


loss, endplate osteophytes and endplate sclerosis/lucencies, underlying


infection is not excluded. Correlate clinically. Stimulator leads are


seen in the lower thoracic spine.


Small left and moderate right pleural effusion with bilateral lower lobe


consolidation..




















DICTATED BY: TUCKER RAYO MD


DATE: 12/15/24 1245





REASON: AMS


ORDERING PHYSICIAN: BRAYAN GENAO NP


PROCEDURE: HEAD WO  -  CT HEAD/BRAIN W/O CONTRAST





CT HEAD/BRAIN W/O CONTRAST





INDICATION:   AMS





TECHNIQUE: CT HEAD/BRAIN W/O CONTRAST. CT was performed with one or more


of the following dose reduction techniques: Automated exposure control,


adjustment of the mA and/or kV according to the patient's size, or use


of the iterative reconstruction technique.


Comparison: None





FINDINGS:


Cerebral atrophy seen. Nonspecific periventricular and subcortical white


matters changes are noted likely representing small vessel ischemic


changes. No midline shift or herniation. No extra axial collection. No


acute intracranial bleed.


The visualized paranasal sinuses and mastoid air cells are normally


aerated.











IMPRESSION: 


Mild atrophy. No acute intracranial bleed is seen.


Scattered nonspecific white matter changes 























DICTATED BY: TUCKER RAYO MD


DATE: 12/14/24 1736





REASON: SOB


ORDERING PHYSICIAN: BRAAYN GENAO NP


PROCEDURE: CXR1VW  -  CHEST 1VW














INDICATION: SOB





TECHNIQUE: CHEST 1VW





COMPARISON: 12/12/2024








FINDINGS/IMPRESSION:


Bilateral airspace consolidation suggesting vascular congestion/edema


versus pneumonia. Small right effusion is seen.


Cardiomegaly is seen 


Mild degenerative changes of the spine.


The visualized upper abdomen appears unremarkable.


























DICTATED BY: TUCKER RAYO MD


DATE: 12/14/24 1819





ASSESSMENT: 


Hypokalemia


Acute on chronic renal failure 


Nephrotic syndrome


Metabolic encephalopathy


Sepsis without shock


Pneumonia


Diabetes mellitus type 2


Hypertension


Obesity BMI 31.7


PAD both lower extremities


Right pleural effusion with mild interstitial fibrosis


Possible infectious process involving L1-L2.





PLAN: 


Labs, diagnostic, radiologic exams reviewed and interpreted by myself and 

supervising physician. 


We have reviewed external records in detail


Potassium replacement has been ordered


Require close monitoring of renal function and electrolytes 


Order CBC, CMP, and electrolytes in am 


Follow blood cultures


BiPAP as necessary, for respiratory distress


Monitor blood pressure adjust medication doses as needed


Avoid hypotensive episodes


May use Dilaudid 0.5 mg IV every 6 hours as needed for severe pain


Monitor blood sugars 


Strict intake, output, and daily weight should be monitored


Please renally adjust medications


Avoid nephrotoxic and nonsteroidal drugs


Avoid contrast if possible


Will continue to monitor renal function, anemia, electrolytes


Treatment plan discussed with patient


Questions were answered


We have discussed with the other team physicians in detail about the care plan


We will continue to monitor the patient closely


ATTESTATION BY PHYSICIAN


I have seen and examined the patient. I reviewed the documentation, medical 

decision making, and treatment plan as noted by the mid-level provider above. I 

agree with the findings and plan of care.











ELISEO FARRELL MD, [facility]             Dec 19, 2024 14:01

## 2024-12-20 VITALS
RESPIRATION RATE: 18 BRPM | DIASTOLIC BLOOD PRESSURE: 74 MMHG | TEMPERATURE: 97.9 F | SYSTOLIC BLOOD PRESSURE: 145 MMHG | HEART RATE: 85 BPM

## 2024-12-20 VITALS — HEART RATE: 85 BPM | RESPIRATION RATE: 18 BRPM

## 2024-12-20 VITALS
SYSTOLIC BLOOD PRESSURE: 125 MMHG | DIASTOLIC BLOOD PRESSURE: 80 MMHG | RESPIRATION RATE: 20 BRPM | HEART RATE: 84 BPM | TEMPERATURE: 98.1 F

## 2024-12-20 VITALS — SYSTOLIC BLOOD PRESSURE: 145 MMHG | DIASTOLIC BLOOD PRESSURE: 74 MMHG

## 2024-12-20 VITALS
SYSTOLIC BLOOD PRESSURE: 146 MMHG | RESPIRATION RATE: 20 BRPM | TEMPERATURE: 98.1 F | DIASTOLIC BLOOD PRESSURE: 76 MMHG | HEART RATE: 88 BPM

## 2024-12-20 VITALS — OXYGEN SATURATION: 98 % | RESPIRATION RATE: 18 BRPM | HEART RATE: 85 BPM

## 2024-12-20 LAB
ALBUMIN SERPL-MCNC: 1.3 G/DL (ref 3.5–5)
ALT SERPL-CCNC: 22 U/L (ref 12–78)
AST SERPL-CCNC: 19 U/L (ref 10–37)
BILIRUB SERPL-MCNC: 0.3 MG/DL (ref 0.2–1)
BUN SERPL-MCNC: 54 MG/DL (ref 7–18)
CHLORIDE SERPL-SCNC: 110 MMOL/L (ref 101–111)
CO2 SERPL-SCNC: 29 MMOL/L (ref 21–32)
CREAT SERPL-MCNC: 2.3 MG/DL (ref 0.5–1)
ERYTHROCYTE [DISTWIDTH] IN BLOOD BY AUTOMATED COUNT: 14.8 % (ref 11–15.5)
GFR SERPL CREATININE-BSD FRML MDRD: 23 ML/MIN (ref 90–?)
GLUCOSE SERPL-MCNC: 78 MG/DL (ref 70–105)
HCT VFR BLD AUTO: 32.3 % (ref 36–48)
MAGNESIUM SERPL-MCNC: 2 MG/DL (ref 1.8–2.4)
MCH RBC QN AUTO: 30.8 PG (ref 27–33)
MCHC RBC AUTO-ENTMCNC: 32.2 G/DL (ref 32–36)
MCV RBC AUTO: 95.6 FL (ref 79–99)
NRBC BLD MANUAL-RTO: 0 % (ref 0–0.19)
PHOSPHATE SERPL-MCNC: 4.7 MG/DL (ref 2.5–4.9)
PLATELET # BLD AUTO: 217 K/UL (ref 130–400)
POTASSIUM SERPL-SCNC: 3.2 MMOL/L (ref 3.5–5.1)
PROT SERPL-MCNC: 4.7 G/DL (ref 6–8.3)
RBC # BLD AUTO: 3.38 MIL/UL (ref 4–5.5)
SODIUM SERPL-SCNC: 146 MMOL/L (ref 136–145)
WBC # BLD AUTO: 12.2 K/UL (ref 4.8–10.8)

## 2024-12-20 RX ADMIN — POTASSIUM CHLORIDE ONE MEQ: 1500 TABLET, EXTENDED RELEASE ORAL at 10:44

## 2024-12-20 NOTE — PN
INFECTIOUS DISEASE  PROGRESS NOTE








Date of Service: Dec 20, 2024





SUBJECTIVE:


This is a 69-year-old female patient who was admitted with chief complaint 

altered mental status and muscle spasms. An MRI of the lumbar spine was obtained

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 

causing anterior thecal sac compression at the L4-5 level.


Patient was seen and examined at bedside in room 327.  


Patient is sitting up on the bedside chair.  No dyspnea observe and saturating 

92-95% on room air.  Patient is afebrile, temperature is 97.9.  Patient is 

being discharged to home today.  No antibiotics needed on discharge.








PHYSICAL EXAM


EYES:  Anicteric.  Pupils equal and reactive.


HENT: No oral thrush seen, moist Oral mucosa.


NECK:  Supple, no JVD or thyromegaly.


LUNGS:  Good air entry.  No rales, no rhonchi.


CARDIOVASCULAR:  S1, S2 regular.  No murmur heard.


ABDOMEN:  Soft, non tender, bowel sounds present, no organomegaly.


CENTRAL NERVOUS SYSTEM:  Awake, alert, oriented x 3.


SKIN:  No rashes, no swelling.


LYMPHATICS: No peripheral lymphadenopathy.


MUSCULOSKELETAL:  No joint swelling, erythema or tenderness.


EXTREMITIES:  No cyanosis or clubbing.


BACK:  No deformity, no pressure ulcer.


GENITOURINARY:  No dysuria or hematuria.








Vital Sign (Last 12 Hours)








 12/20/24 12/20/24 12/20/24 12/20/24





 04:00 07:16 07:17 08:00


 


Temp 98.1   97.9


 


Pulse 84 85 85 85


 


Resp 20 18 18 18


 


B/P (MAP) 125/80   145/74


 


Pulse Ox 95   92


 


O2 Delivery Room Air  N/A Room Air Room Air


 


FiO2   21 21


 


    





 12/20/24   





 10:56   


 


B/P (MAP) 145/74   














Intake & Output (last 24hrs)   


 


 12/19/24 12/19/24 12/20/24





 15:00 23:00 07:00


 


Intake Total   100.0 ml


 


Output Total  200 ml 


 


Balance  -200 ml 100.0 ml











LABS:








                                   Laboratory:








Test


 12/20/24


05:12 12/20/24


05:06 12/19/24


11:07 Range/Units


 


 


Whole Blood Glucose 70 #     MG/DL


 


White Blood Count  12.2 H  4.8-10.8  K/uL


 


Red Blood Count


 


 3.38 L


 


 4.00-5.50


MIL/uL


 


Hemoglobin  10.4 L  12.0-16.0  g/dL


 


Hematocrit  32.3 L  36-48  %


 


Mean Corpuscular Volume  95.6   79-99  fL


 


Mean Corpuscular Hemoglobin  30.8   27.0-33.0  pg


 


Mean Corpuscular Hemoglobin


Concent 


 32.2 


 


 32.0-36.0  g/dL





 


Red Cell Distribution Width  14.8   11.0-15.5  %


 


Platelet Count  217   130-400  K/uL


 


Mean Platelet Volume  12.3 H  7.5-10.5  fL


 


Nucleated Red Blood Cells  0.0   0.0-0.19  %


 


Sodium Level  146 H  136-145  mmol/L


 


Potassium Level  3.2 L  3.5-5.1  mmol/L


 


Chloride Level  110   101-111  mmol/L


 


Carbon Dioxide Level  29   21-32  mmol/L


 


Blood Urea Nitrogen  54 H  7-18  mg/dL


 


Creatinine  2.3 H  0.5-1.0  mg/dL


 


Glomerular Filtration Rate


Calc 


 23 


 


 >90  mL/min





 


Random Glucose  78     mg/dL


 


Total Calcium  7.7 L  8.5-10.1  mg/dL


 


Phosphorus Level  4.7   2.5-4.9  mg/dL


 


Magnesium Level


 


 2.00 


 


 1.80-2.40


mg/dL


 


Total Bilirubin  0.3   0.2-1.0  mg/dL


 


Aspartate Amino Transf


(AST/SGOT) 


 19 


 


 10-37  U/L





 


Alanine Aminotransferase


(ALT/SGPT) 


 22 


 


 12-78  U/L





 


Alkaline Phosphatase  83     U/L


 


Total Protein  4.7 L  6.0-8.3  g/dL


 


Albumin  1.3 L  3.5-5.0  g/dL


 


Bedside Glucose Comment   Notified Nurse   














ASSESSMENT:


Leukocytosis.  


Acute on chronic renal failure.  


Heart failure.  


Multifactorial Encephalopathy.


Diabetes mellitus.  


Iron-deficiency Anemia.  


Bilateral pleural effusion.


Anterior thecal sac compression on lumbar MRI.








PLAN:


Patient is being discharged to home today.  


No antibiotics needed on discharge.





This case was reviewed and discussed with my supervising physician and the above

assessment and plan was formulated and agreed upon.


ATTESTATION BY PHYSICIAN


I have seen and examined the patient. I reviewed the documentation, medical 

decision making, and treatment plan as noted by the mid-level provider above. I 

agree with the findings and plan of care.











ISAAC JUÁREZ MD, MIRTA L FNP             Dec 20, 2024 15:40

## 2024-12-20 NOTE — PN
NEPHROLOGY PROGRESS NOTE








Date/Time Patient Seen: Dec 20, 2024





SUBJECTIVE:


This is a 69 year old female with a past medical history of congestive heart 

failure history, hypertension, hyperlipidemia and chronic low back pain. 


She presented to the emergency room with complaints of altered mental status.


Patient has a recent hospital admission for similar reason and was discharged on

12/11/2024


Blood culture has been negative 


She continues on broad-spectrum antibiotics, including vancomycin.


Imaging studies were noted.


Neurosurgery has been consulted for possible lumbar spines osteomyelitis


She was noted to have elevated BUN/creatinine.  


We are consulted for renal failure.  


Renal function and electrolytes are stable.


Iron panel was noted. 


She was seen in the medical floor,  in no acute distress


She is complaining of mouth sores.


Condition is critical and guarded





REVIEW OF SYSTEMS:


GENERAL:  Positive for generalized weakness


NEUROLOGIC: Negative for any blurry vision, blind spots, double vision, facial 

asymmetry, dysphagia, dysarthria, hemiparesis, hemisensory deficits, vertigo, 

ataxia.


HEENT: Negative for any head trauma, neck trauma, neck stiffness, photophobia, 

phonophobia, sinusitis, rhinitis.


CARDIAC: Negative for any chest pain, dyspnea on exertion, paroxysmal nocturnal 

dyspnea, peripheral edema.


PULMONARY: Negative for any shortness of breath, wheezing, COPD, or TB exposure.


GASTROINTESTINAL: Negative for any abdominal pain, nausea, vomiting, bright red 

blood per rectum, melena.


GENITOURINARY: Negative for any dysuria, hematuria, incontinence.


INTEGUMENTARY: Negative for any rashes, cuts, insect bites.


RHEUMATOLOGIC: Negative for any joint pains, photosensitive rashes, history of 

vasculitis or kidney problems.


HEMATOLOGIC: Negative for any abnormal bruising, frequent infections or 

bleeding.





                             Vital Signs (last 8hr)








  Date Time  Temp Pulse Resp B/P (MAP) Pulse Ox O2 Delivery O2 Flow Rate FiO2


 


12/16/24 12:03  82 16     


 


12/16/24 12:00 98.4 99 23 158/85 94 Room Air  


 


12/16/24 11:00  83 10 170/86 93 Room Air  


 


12/16/24 10:00  82 12 174/89 96 Room Air  


 


12/16/24 09:00  93 15 163/79 94 Room Air  


 


12/16/24 08:16    164/68    


 


12/16/24 08:00     95 Room Air* 0 21


 


12/16/24 08:00  97 17 161/94 93 Room Air  


 


12/16/24 07:00 97.9 93 15 164/68 90 Nasal Cannula 2.0 


 


12/16/24 06:38  92 16     


 


12/16/24 06:38  92 16   N/Cannula Low lpm 2.0 28








PHYSICAL EXAM:


GENERAL: Alert and oriented x 3. No acute distress. Well-nourished.


EYES: EOMI. Anicteric.


HENT: Moist mucous membranes. No scleral icterus. No cervical lymphadenopathy.


LUNGS: Clear to auscultation bilaterally. No accessory muscle use.


CARDIOVASCULAR: Regular rate and rhythm. No murmur. No JVD.


ABDOMEN: Soft, non-tender and non-distended. No palpable masses.


EXTREMITIES: No edema. Non-tender.


SKIN: No rashes or lesions. Warm.


NEUROLOGIC: No focal neurological deficits. CN II-XII grossly intact, but not in

dividually tested.


PSYCHIATRIC: Cooperative. Appropriate mood and affect.








                               Current Medications








 Medications


  (Trade)  Dose


 Ordered  Sig/Chaim


 Route


 PRN Reason  Start Time


 Stop Time Status Last Admin


Dose Admin


 


 Acetaminophen


  (TYLenol 325MG


 TAB)  650 mg  Q6H  PRN


 PO


 TEMPERATURE GREATER THAN 101.5  12/14/24 23:00


 12/15/24 17:42 DC 12/15/24 17:35


650 MG


 


 Acetaminophen


  (TYLenol 325MG


 TAB)  650 mg  Q6H  PRN


 PO


 MILD PAIN (1-3)  12/15/24 17:30


 1/14/25 17:29  12/16/24 08:15


650 MG


 


 Albuterol


  (DUOneb)  1 UDVIAL  [id number]


 IH


   12/15/24 00:00


 1/14/25 00:00  12/16/24 12:02


1 UDVIAL


 


 Albuterol Sulfate


  (Proventil


 0.083% 2.5mg/3ml)  2.5MG  Q4H  PRN


 IH


 SHORTNESS OF BREATH  12/14/24 23:00


 1/13/25 22:59   





 


 Azithromycin  250 ml @ 


 250 mls/hr  Q24H  STAT


 IVPB


   12/14/24 20:16


 12/14/24 21:15 DC 12/14/24 20:42


250 MLS/HR


 


 Calcium Gluconate


 1 gm/Sodium


 Chloride  100 ml @ 0


 mls/hr  PROTOCOL


 IV


   12/14/24 22:00


 1/13/25 21:59   





 


 Calcium Gluconate


 1 gm/Sodium


 Chloride  100 ml @ 0


 mls/hr  PROTOCOL


 IV


   12/15/24 05:00


 12/15/24 04:51 DC  





 


 Carvedilol


  (Coreg 6.25MG)  6.25 mg  BIDMEALS


 PO


   12/14/24 22:00


 1/13/25 21:59  12/16/24 08:16


6.25 MG


 


 Ceftriaxone Sodium


  (ROCEphine 1G


 INJ)  1 gm  ONCE  STAT


 IVPB


   12/14/24 20:16


 12/14/24 20:18 DC 12/14/24 20:42


1 GM


 


 Dextrose


  (D50w)  50 ml  AD  PRN


 IV


 HYPOGLYCEMIA PROTOCOL  12/14/24 21:30


 1/13/25 21:29   





 


 Doxycycline


 Hyclate  250 ml @ 


 125 mls/hr  Q12H


 IV


   12/15/24 12:00


 12/15/24 10:45 DC  





 


 Famotidine


  (Pepcid 20mg


 Vial)  20 mg  DAILY


 IV


   12/15/24 09:00


 1/14/25 08:59  12/16/24 08:16


20 MG


 


 Furosemide


  (LASix 20MG VIAL)  20 mg  Q8H


 IV


   12/14/24 21:30


 1/13/25 21:29  12/16/24 05:44


20 MG


 


 Gabapentin


  (NEURontin 100


 mg CAP)  100 mg  TID


 PO


   12/16/24 10:00


 1/15/25 09:59  12/16/24 10:11


100 MG


 


 Glucagon


  (Glucagon 1mg


 Kit)  1 mg  AD  PRN


 IM


 HYPOGLYCEMIA PROTOCOL  12/14/24 21:30


 1/13/25 21:29   





 


 Heparin Sodium


  (Porcine)


  (HEParin 5,000


 UNIT VIAL)  5,000 unit  BID


 SQ


   12/15/24 09:00


 1/14/25 08:59  12/16/24 08:18


5,000 UNIT


 


 Heparin Sodium


  (Porcine)


  (HEParin 5,000


 UNIT VIAL)  5,000 unit  BID


 SQ


   12/15/24 09:00


 12/14/24 23:12 DC  





 


 Hydralazine HCl


  (APRESOLine 20MG


 INJ)  10 mg  Q6H  PRN


 IV


 For:SBP above 160;DBP above 90  12/14/24 23:00


 1/13/25 22:59  12/16/24 10:12


10 MG


 


 Hydromorphone HCl


  (DiLAUDid 1MG


 INJ)  1 mg  Q4H  PRN


 IVP


 SEVERE PAIN (7-10)  12/16/24 10:00


 12/21/24 09:59  12/16/24 10:43


1 MG


 


 Insulin Human


 Regular


  (humuLIN R 100


 UNIT/ML 3ML)  INSULIN


 SLIDING


 SCAL...  ACHS


 SQ


   12/16/24 11:30


 1/15/25 11:29   





 


 Insulin Human


 Regular


  (humuLIN R 100


 UNIT/ML 3ML)  INSULIN


 SLIDING


 SCAL...  Q6H6


 SQ


   12/15/24 00:00


 12/16/24 10:40 DC 12/15/24 18:22


3 UNIT


 


 Levofloxacin/


 Dextrose  50 ml @ 50


 mls/hr  Q48H


 IVPB


   12/15/24 11:00


 12/25/24 10:59  12/15/24 12:40


50 MLS/HR


 


 Levofloxacin/


 Dextrose  100 ml @ 


 100 mls/hr  Q24H


 IV


   12/15/24 11:00


 12/15/24 10:53 DC  





 


 Lorazepam


  (AtiVAN)  1 mg  ONCE  STAT


 IVP


   12/14/24 17:39


 12/14/24 17:40 DC 12/14/24 17:46


1 MG


 


 Meropenem


  (Merrem)  500 mg  Q12H


 IVPB


   12/15/24 11:00


 12/25/24 10:59  12/16/24 10:11


500 MG


 


 Meropenem 500 mg/


 Sodium Chloride  100 ml @ 


 33.333 mls/


 hr  Q12H


 IV


   12/15/24 10:30


 12/15/24 10:48 DC  





 


 Methylprednisolone


 Sodium Succinate


  (Solu-medROL


 40MG)  40 mg  BID


 IVP


   12/15/24 09:00


 12/15/24 10:45 DC 12/15/24 09:36


40 MG


 


 Ondansetron HCl


  (zoFRAN 4MG INJ)  4 mg  Q6H  PRN


 IV


 NAUSEA/VOMITING  12/14/24 23:00


 1/13/25 22:59  12/16/24 11:55


4 MG


 


 Piperacillin Sod/


 Tazobactam Sod


  (Zosyn


 3.375gm+NS 50ml)  3.375 gm  Q12H


 IVPB


   12/14/24 21:00


 12/15/24 10:45 DC 12/15/24 09:36


3.375 GM


 


 Sodium Chloride  1,000 ml @ 


 500 mls/hr  Q2H STAT


 IV


   12/14/24 20:04


 12/14/24 22:03 DC 12/14/24 20:40


500 MLS/HR


 


 Sodium Chloride


  (NS 50ml)  50 ml  AD


 IV


   12/15/24 00:00


 12/14/24 21:15 DC  





 


 Vancomycin HCl  250 ml @ 


 125 mls/hr  Q48H


 IV


   12/17/24 11:30


 12/27/24 11:29   





 


 Vancomycin HCl


  (Vancomycin


 Protocol)  1 each  AD


 IV


   12/15/24 10:30


 12/29/24 10:29   














LABORATORY:


[ ]








                                Hematology Labs:








Test


 12/20/24


05:06 Range/Units


 


 


White Blood Count 12.2 H 4.8-10.8  K/uL


 


Red Blood Count


 3.38 L


 4.00-5.50


MIL/uL


 


Hemoglobin 10.4 L 12.0-16.0  g/dL


 


Hematocrit 32.3 L 36-48  %


 


Mean Corpuscular Volume 95.6  79-99  fL


 


Mean Corpuscular Hemoglobin 30.8  27.0-33.0  pg


 


Mean Corpuscular Hemoglobin


Concent 32.2 


 32.0-36.0  g/dL





 


Red Cell Distribution Width 14.8  11.0-15.5  %


 


Platelet Count 217  130-400  K/uL


 


Mean Platelet Volume 12.3 H 7.5-10.5  fL


 


Nucleated Red Blood Cells 0.0  0.0-0.19  %








                                 Chemistry Labs:








Test


 12/20/24


05:12 12/20/24


05:06 12/19/24


11:07 Range/Units


 


 


Whole Blood Glucose 70 #     MG/DL


 


Sodium Level  146 H  136-145  mmol/L


 


Potassium Level  3.2 L  3.5-5.1  mmol/L


 


Chloride Level  110   101-111  mmol/L


 


Carbon Dioxide Level  29   21-32  mmol/L


 


Blood Urea Nitrogen  54 H  7-18  mg/dL


 


Creatinine  2.3 H  0.5-1.0  mg/dL


 


Glomerular Filtration Rate


Calc 


 23 


 


 >90  mL/min





 


Random Glucose  78     mg/dL


 


Total Calcium  7.7 L  8.5-10.1  mg/dL


 


Phosphorus Level  4.7   2.5-4.9  mg/dL


 


Magnesium Level


 


 2.00 


 


 1.80-2.40


mg/dL


 


Total Bilirubin  0.3   0.2-1.0  mg/dL


 


Aspartate Amino Transf


(AST/SGOT) 


 19 


 


 10-37  U/L





 


Alanine Aminotransferase


(ALT/SGPT) 


 22 


 


 12-78  U/L





 


Alkaline Phosphatase  83     U/L


 


Total Protein  4.7 L  6.0-8.3  g/dL


 


Albumin  1.3 L  3.5-5.0  g/dL


 


Bedside Glucose Comment   Notified Nurse   











DIAGNOSTICS / RADIOLOGY:


REASON: AMS


ORDERING PHYSICIAN: JOHN PAUL CONDE


PROCEDURE: BRAIN WO  -  MR BRAIN WO CON





MR BRAIN WO CON





HISTORY: Altered mental status





COMPARISON: None





TECHNIQUE: MRI of the brain was performed utilizing multiple pulse


sequences in axial, coronal and sagittal planes. Patient was not given


contrast through intravenous route.





FINDINGS: The ventricles and extraventricular CSF spaces are dilated


consistent with cerebral atrophy. Nonspecific white matter changes are


seen. There is no midline shift, mass effect or herniation. No subacute


hemorrhage is seen. No MR evidence of acute infarct is seen in the


diffusion weighted images. Cerebellar tonsils are in normal position.


There are bilateral ethmoid and sphenoid sinusitis with mucoperiosteal


thickening.  No MR evidence of a mass lesion is seen in this noncontrast


study.





IMPRESSION: 


1. No MR evidence of acute infarct is seen in the diffusion weighted


images. Atrophy with white matter changes.








DICTATED BY: SHAQ ZUNIGA MD


DATE: 12/16/24 1655


REASON: PNEUMONIA


ORDERING PHYSICIAN: ISAAC JUÁREZ MD


PROCEDURE: CHEST WO  -  CT CHEST W/O CONTRAST





CT CHEST W/O CONTRAST





HISTORY:   Pneumonia





COMPARISON: None





TECHNIQUE: Multiple sequential axial images of the chest were obtained


from the thoracic inlet through upper abdomen. Patient was not given


contrast through intravenous route.





FINDINGS: There are bilateral pleural effusions with compressive


atelectasis. Coronary arterial calcifications are seen. Gallbladder is


distended. There is mild anasarca.  No pleural effusion or pericardial


effusion is seen. There is no evidence of pneumothorax. There are normal


size mediastinal and hilar lymph nodes. The heart is not enlarged.


Degenerative changes of the thoracolumbar spine are present.  There is


no evidence of adrenal nodule.





IMPRESSION: 


1. Mild to moderate bilateral pleural effusions with compressive


atelectasis.














CT was performed with one or more following dose reduction techniques:


automated exposure control, adjustment of the mA and kv according to


patient's size, or use of a iterative reconstruction technique.








DICTATED BY: SHAQ ZUNIGA MD


DATE: 12/16/24 1715





REASON: Rule out ostemyelitis


ORDERING PHYSICIAN: TANNER PERRY CNP


PROCEDURE: L SPN WO  -  MR SPINAL CANAL, LUMBAR WO CON





MR SPINAL CANAL, LUMBAR WO CON





HISTORY: Pain





COMPARISON: CT from 12/15/2024





TECHNIQUE: MRI of the lumbar spine was performed utilizing multiple


pulse sequences in axial and sagittal plane. Patient was not given


contrast through intravenous route. 





FINDINGS: Endplate degenerative changes with disc space narrowing are


seen predominantly involving the L1-L2 level. Orthopedic fixation plates


and screws are seen traversing the T12 and L1 with paramagnetic


susceptibility artifacts limiting evaluation. There is disc noted at the


lumbosacral junction and for numbering purposes only, this level will be


designated as S1-S2. No abnormal signal intensity is seen of the


visualized bony structure.  No loss of vertebral height is seen.  There


is straightening of normal lumbar curvature which may be related to


muscle spasm or positioning. Degenerative disc signals are present at


all lumbar spine levels. Visualized distal conus is unremarkable.





At the L2-3 level,  there is spondylotic with central disc


protrusion/herniation with bilateral ligamentum flavum hypertrophy


causing anterior thecal sac compression with bilateral lateral recess


stenosis and bilateral neural foraminal stenosis. The thecal sac


measures approximately 7 mm in its anterior posterior dimension.





At the L3-4 level,  there is spondylotic disc with bilateral ligamentum


flavum hypertrophy causing anterior thecal sac compression with


bilateral lateral recess stenosis and bilateral neural foraminal


stenosis. The thecal sac measures approximately 6.2 mm in its anterior


posterior dimension.





At the L4-5 level,  there is spondylotic disc with bilateral ligamentum


flavum hypertrophy causing anterior thecal sac compression with


bilateral lateral recess stenosis and bilateral neural foraminal


stenosis. The thecal sac measures approximately 8.4 mm in its anterior


posterior dimension.





At the L5-S1 level,  there is spondylotic disc with bilateral ligamentum


flavum hypertrophy and facet hypertrophy causing anterior thecal sac


compression with bilateral lateral recess stenosis and bilateral neural


foraminal stenosis. The thecal sac measures approximately 8.2 mm in its


anterior posterior dimension.





IMPRESSION:


1. DJD with lumbar spine spondylosis as described above. Evaluation at


T12 and L1 are limited due to postop artifacts.











DICTATED BY: SHAQ ZUNIGA MD


DATE: 12/16/24 7460





REASON: sepsis, backpain, ruling out device infection


ORDERING PHYSICIAN: TANNER PERRY CNP


PROCEDURE: T SPINE WO  -  CT THORACIC SPINE W/O CONTRAST





CT THORACIC SPINE W/O CONTRAST, CT LUMBAR SPINE W/O CONTRAST





HISTORY: sepsis, back pain, ruling out device infection





TECHNIQUE: CT THORACIC SPINE W/O CONTRAST, CT LUMBAR SPINE W/O CONTRAST.


Coronal and sagittal reformats were obtained. CT was performed with one


or more of the following dose reduction techniques: Automated exposure


control, adjustment of the mA and/or kV according to the patient's size,


or use of the iterative reconstruction technique.








FINDINGS: 


Evaluation of the cord and discs is limited with CT. 


No evidence of compression fracture or dislocation.  


There is diffuse osteopenia degraded evaluation of the study.


Multilevel degenerative changes are noted. Postop changes of posterior


fusion are seen at L1-L2 on the right with pedicle screws and fixation


bar. Advanced degenerative changes are seen at L2-L3 with disc space


loss, endplate osteophytes and endplate sclerosis/lucencies, underlying


infection is not excluded. Correlate clinically. Stimulator leads are


seen in the lower thoracic spine.


Small left and moderate right pleural effusion with bilateral lower lobe


consolidation.


The aorta is normal in caliber. No acute findings in the visualized


abdomen.  The visualized lungs are clear. 








IMPRESSION: 


There is diffuse osteopenia degraded evaluation of the study.


Multilevel degenerative changes are noted. Postop changes of posterior


fusion are seen at L1-L2 on the right with pedicle screws and fixation


bar. Advanced degenerative changes are seen at L2-L3 with disc space


loss, endplate osteophytes and endplate sclerosis/lucencies, underlying


infection is not excluded. Correlate clinically. Stimulator leads are


seen in the lower thoracic spine.


Small left and moderate right pleural effusion with bilateral lower lobe


consolidation..




















DICTATED BY: TUCKER RAYO MD


DATE: 12/15/24 1245





REASON: sepsis, backpain, ruling out device infection


ORDERING PHYSICIAN: TANNER PERRY CNP


PROCEDURE: L SPIN WO  -  CT LUMBAR SPINE W/O CONTRAST





CT THORACIC SPINE W/O CONTRAST, CT LUMBAR SPINE W/O CONTRAST





HISTORY: sepsis, back pain, ruling out device infection





TECHNIQUE: CT THORACIC SPINE W/O CONTRAST, CT LUMBAR SPINE W/O CONTRAST.


Coronal and sagittal reformats were obtained. CT was performed with one


or more of the following dose reduction techniques: Automated exposure


control, adjustment of the mA and/or kV according to the patient's size,


or use of the iterative reconstruction technique.








FINDINGS: 


Evaluation of the cord and discs is limited with CT. 


No evidence of compression fracture or dislocation.  


There is diffuse osteopenia degraded evaluation of the study.


Multilevel degenerative changes are noted. Postop changes of posterior


fusion are seen at L1-L2 on the right with pedicle screws and fixation


bar. Advanced degenerative changes are seen at L2-L3 with disc space


loss, endplate osteophytes and endplate sclerosis/lucencies, underlying


infection is not excluded. Correlate clinically. Stimulator leads are


seen in the lower thoracic spine.


Small left and moderate right pleural effusion with bilateral lower lobe


consolidation.


The aorta is normal in caliber. No acute findings in the visualized


abdomen.  The visualized lungs are clear. 








IMPRESSION: 


There is diffuse osteopenia degraded evaluation of the study.


Multilevel degenerative changes are noted. Postop changes of posterior


fusion are seen at L1-L2 on the right with pedicle screws and fixation


bar. Advanced degenerative changes are seen at L2-L3 with disc space


loss, endplate osteophytes and endplate sclerosis/lucencies, underlying


infection is not excluded. Correlate clinically. Stimulator leads are


seen in the lower thoracic spine.


Small left and moderate right pleural effusion with bilateral lower lobe


consolidation..




















DICTATED BY: TUCKER RAYO MD


DATE: 12/15/24 1245





REASON: AMS


ORDERING PHYSICIAN: BRAYAN GENAO NP


PROCEDURE: HEAD WO  -  CT HEAD/BRAIN W/O CONTRAST





CT HEAD/BRAIN W/O CONTRAST





INDICATION:   AMS





TECHNIQUE: CT HEAD/BRAIN W/O CONTRAST. CT was performed with one or more


of the following dose reduction techniques: Automated exposure control,


adjustment of the mA and/or kV according to the patient's size, or use


of the iterative reconstruction technique.


Comparison: None





FINDINGS:


Cerebral atrophy seen. Nonspecific periventricular and subcortical white


matters changes are noted likely representing small vessel ischemic


changes. No midline shift or herniation. No extra axial collection. No


acute intracranial bleed.


The visualized paranasal sinuses and mastoid air cells are normally


aerated.











IMPRESSION: 


Mild atrophy. No acute intracranial bleed is seen.


Scattered nonspecific white matter changes 























DICTATED BY: TUCKER RAYO MD


DATE: 12/14/24 6060





REASON: SOB


ORDERING PHYSICIAN: BRAYAN GENAO NP


PROCEDURE: CXR1VW  -  CHEST 1VW














INDICATION: SOB





TECHNIQUE: CHEST 1VW





COMPARISON: 12/12/2024








FINDINGS/IMPRESSION:


Bilateral airspace consolidation suggesting vascular congestion/edema


versus pneumonia. Small right effusion is seen.


Cardiomegaly is seen 


Mild degenerative changes of the spine.


The visualized upper abdomen appears unremarkable.


























DICTATED BY: TUCKER RAYO MD


DATE: 12/14/24 1819





ASSESSMENT: 


Hypokalemia


Acute on chronic renal failure 


Nephrotic syndrome


Metabolic encephalopathy


Sepsis without shock


Pneumonia


Diabetes mellitus type 2


Hypertension


Obesity BMI 31.7


PAD both lower extremities


Right pleural effusion with mild interstitial fibrosis


Possible infectious process involving L1-L2.





PLAN: 


Labs, diagnostic, radiologic exams reviewed and interpreted by myself and 

supervising physician. 


We have reviewed external records in detail


Pending discharge disposition later today.  


From Nephrology standpoint, patient may be discharged 


Follow up in the renal clinic in 1-2 weeks.


Monitor blood pressure adjust medication doses as needed


Avoid hypotensive episodes


Monitor blood sugars 


Strict intake, output, and daily weight should be monitored


Please renally adjust medications


Avoid nephrotoxic and nonsteroidal drugs


Avoid contrast if possible


Will continue to monitor renal function, anemia, electrolytes


Treatment plan discussed with patient


Questions were answered


We have discussed with the other team physicians in detail about the care plan


We will continue to monitor the patient closely


ATTESTATION BY PHYSICIAN


I have seen and examined the patient. I reviewed the documentation, medical d

ecision making, and treatment plan as noted by the mid-level provider above. I 

agree with the findings and plan of care.











ELISEO FARRELL MD, [facility]             Dec 20, 2024 13:34

## 2024-12-20 NOTE — DS
Discharge Summary


Hospital Course Summary:


The patient initially admitted to the hospital December 14, 2024 with the chief 

complaint of altered mental status.





The patient was evaluated in the emergency, laboratory data show WBC of 12.1 

with a left shift, neutrophils 81%, creatinine of 3.5, BUN 64.





Urinalysis was unremarkable, urine drug screen negative.





ABG was done, unremarkable.  





 Chest x-ray shows bilateral airspace consolidation she was just seen vascular 

congestion/edema versus pneumonia.  Small right effusion is seen.  





CT of head shows mild atrophy.  No acute intracranial bleed is seen.  Scattered 

nonspecific white matter changes.





12/15 Pt seen at bedside, no acute events overnight.  She is currently not 

needing pressors or intubation however she is somnolent with rigors suggesting 

severe infection.  Will continue with current care, broad spectrum antibiotics 

and ICU care.  Appreciate nephrology recommendations.  





12/16 the patient has been seen and examined earlier this morning during my 

rounding, no acute events overnight, she is alert oriented x3, hemodynamically 

stable, saturating normal on air.  Results of MRI thoracic and lumbar spine 

reviewed, discussed with the patient, questions answered.  WBC today trending 

up.





12/17 the patient has been seen and examined during my rounding, downgraded to 

the medical floor, remains alert oriented x3, BP mildly elevated, 150/81, rest 

of the vital signs are unremarkable.  WBC trending down, today 15.9, discussed 

with the patient.  Findings of MRI lumbar spine reviewed, discussed with the 

patient, all questions answered.





12/18 the patient has been seen and examined during my rounding, she remains 

comfortably in bed, hemodynamically stable, afebrile, WBC slowly trending down. 

Getting good pain control with current medical management, no family members at 

bedside during my visit.





12/19 patient is seen and examined, comfortably in bed, no acute events 

overnight, alert and oriented x3, following commands, hemodynamically stable, 

getting IV antibiotics.  The patient with persistent leukocytosis 14.0.  Denies 

chest pain, no shortness a breath, no nausea, no vomiting.  Patient with Matos 

catheter in place, we will remove today.





During the course of the hospitalization thoracic and lumbar spine MRI was done,

the patient with a history of back surgery several years ago, findings of 

postoperative changes posterior fusion L1-L2 on the right with pedicle screws 

and fixation bar, advanced degenerative changes seen at L2-L3 with disc space 

loss, endplate osteophytes and endplate sclerosis/lucency is underlying 

infection not excluded.  For this reason patient was placed on broad-spectrum 

antibiotics and infectious disease consultation requested, MRI was done, no 

evidence of osteomyelitis.  The case was discussed with neurosurgeon, medical 

management recommended an outpatient follow up.





12/20 today the patient is hemodynamically stable, alert oriented x3, no chest 

pain, shortness shortness for breath, no nausea, no vomiting, plan for the 

patient to be discharged home today.  Results of septic workup negative





GENERAL APPEARANCE:  The patient is awake, alert, and oriented, in no acute 

cardiopulmonary distress.


NEUROLOGICAL:  Cranial nerves II-XII grossly intact.  Motor is 5/5 in bilateral 

upper and lower extremities proximal to distal.  No sensory deficits.


HEENT:  Face is symmetric. Pupils are equal and reactive.  Extraocular movements

are intact.


NECK:  Supple.  No JVD. No thyromegaly. No submental, submandibular, pre-

/postauricular, occipital or supraclavicular lymphadenopathy.


CHEST:  Normal chest expansion. No Telemetry.


LUNGS:  Absence of any rales, rhonchi or any wheezing.


CARDIOVASCULAR:  Regular.  S1 and S2 normal.  No appreciable rubs, murmurs or 

gallops. 


ABDOMEN:  Soft, nontender, and nondistended.  There is no rebound, voluntary 

guarding, or rigidity.


:  Deferred. No Matos.


EXTREMITIES:  Non-edematous and not cyanotic.  No clubbing.  Good capillary 

refill.


SKIN:  No skin breakdown.


Consultant(s):


Critical Care, Infectious Disease, hematologist.


Assessment/Plan:





Final diagnosis





Metabolic encephalopathy


Sepsis without shock


Pneumonia


Chronic renal failure. 


Hypokalemia, POA


Diabetes mellitus type 2 last A1C 9.7


Hypertension


Obesity BMI 31.7


PAD both lower extremities


Right pleural effusion with mild interstitial fibrosis


Possible infectious process involving L1-L2.


Discharge Instructions:


Patient to be discharged home today, to follow with primary care physician as an

outpatient and to return to the hospital if condition changes.  Patient agreed 

with plan and understood the information provided.


Home Medications:


Active Scripts


Cephalexin (Cephalexin) 500 Mg Capsule, 1 CAP PO TID for 7 Days, #21 CAP 0 

Refills


   Prov:KAELA SANDS DO         12/12/24


Doxycycline Hyclate (Doxycycline Hyclate) 100 Mg Capsule, 100 MG PO BID, #8 CAP


   Prov:ORLANDO WOOD FNP         12/11/24


Nifedipine (Nifedipine ER) 30 Mg Tablet.er, 30 MG PO TIDMEALS, #90 TAB 0 Refills


   Prov:KAIT YUAN AGPCNP         12/7/24


Atorvastatin Calcium (LIPITOR) 40 Mg Tablet, 40 MG PO HS, #30 TAB 0 Refills


   Prov:KAIT YUAN AGPCNP         12/7/24


Reported Medications


Budesonide/Formoterol Fumarate (Budesonide-Formoterol 160-4.5) 160 Mcg-4.5 

Mcg/Actuation Hfa.aer.ad, 1 PUFF IH DAILY for 30 Days, #10.2 GM 0 Refills


   12/5/24


Carvedilol (Carvedilol) 6.25 Mg Tablet, 1 TAB PO BID


   12/2/24


Fluticasone Propionate (Fluticasone Propionate) 50 Mcg/Actuation [place].susp, 2 

SPRAY NS DAILY for 30 Days, #16 GM 0 Refills


   12/1/24


Azelastine HCl (Azelastine HCl) 137 Mcg (0.1 %) [place].pump, 2 SPRAY NS BID for 

30 Days, #30 ML 0 Refills


   12/1/24


Cholecalciferol (Vitamin D3) (Vitamin D3) 25 Mcg (1000 Unit) Capsule, 1 CAP PO 

DAILY for 30 Days, #30 CAP 0 Refills


   12/1/24


Ondansetron HCl (Ondansetron HCl) 4 Mg Tablet, 1 TAB PO Q4HPRN PRN for 

nausea/vomiting for 3 Days, #18 TAB 0 Refills


   12/1/24


Aspirin (Aspirin) 81 Mg Tab.chew, 1 TAB PO DAILY for 30 Days, #30 TAB 0 Refills


   12/1/24


Montelukast Sodium (Singulair) 10 Mg Tablet, 10 MG PO HS, TAB


   12/1/24


Dapagliflozin Propanediol (Farxiga) 5 Mg Tablet, 5 MG PO DAILY, TAB


   12/1/24


Hydralazine HCl (Hydralazine HCl) 50 Mg Tablet, 1 TAB PO BID for 30 Days, #60 

TAB 0 Refills


   12/1/24


Torsemide (Torsemide) 20 Mg Tablet, 1 TAB PO DAILY for 30 Days, #30 TAB 0 

Refills


   12/1/24


Losartan Potassium (Losartan Potassium) 100 Mg Tablet, 1 TAB PO DAILY for 30 

Days, #30 TAB 0 Refills


   12/1/24


Omeprazole (Omeprazole) 40 Mg Capsule.dr, 1 CAP PO DAILY for 30 Days, #30 CAP 0 

Refills


   12/1/24


Clopidogrel Bisulfate (Plavix) 75 Mg Tablet, 1 TAB PO DAILY for 30 Days, #30 TAB

0 Refills


   12/1/24


Vit B Cmplx 3/FA/Vit C/Biotin (Ruby-John Rx Tablet) 1 Mg-60 Mg-300 Mcg Tablet, 1

TAB PO DAILY for 30 Days, #30 TAB 0 Refills


   12/1/24


Gabapentin (Gabapentin) 100 Mg Capsule, 3 CAP PO BID for 30 Days, #90 CAP 0 

Refills


   12/1/24


Time spent arranging discharge:  31-60 minutes











BENJAMÍN MEJIA MD           Dec 20, 2024 12:36

## 2024-12-20 NOTE — NUR
DISCONTINUED IV ACCESS. PATIENT READY FOR DISCHARGE. DISCUSSED FOLLOW UP APPOINTMENTS, HOME 
MEDICATIONS AND PLAN OF CARE. PATIENT VERBALIZED UNDERSTANDING. GOT DISCHARGED AT 1500.